# Patient Record
Sex: MALE | Race: WHITE | NOT HISPANIC OR LATINO | Employment: FULL TIME | URBAN - NONMETROPOLITAN AREA
[De-identification: names, ages, dates, MRNs, and addresses within clinical notes are randomized per-mention and may not be internally consistent; named-entity substitution may affect disease eponyms.]

---

## 2017-01-23 ENCOUNTER — HOSPITAL ENCOUNTER (OUTPATIENT)
Dept: GENERAL RADIOLOGY | Facility: HOSPITAL | Age: 22
Discharge: HOME | End: 2017-01-23
Admitting: NURSE PRACTITIONER

## 2017-01-23 ENCOUNTER — TRANSCRIBE ORDERS (OUTPATIENT)
Dept: ADMINISTRATIVE | Facility: HOSPITAL | Age: 22
End: 2017-01-23

## 2017-01-23 DIAGNOSIS — M79.672 LEFT FOOT PAIN: Primary | ICD-10-CM

## 2017-01-23 PROCEDURE — 73620 X-RAY EXAM OF FOOT: CPT

## 2018-10-07 ENCOUNTER — LAB (OUTPATIENT)
Dept: LAB | Facility: HOSPITAL | Age: 23
End: 2018-10-07

## 2018-10-07 ENCOUNTER — TRANSCRIBE ORDERS (OUTPATIENT)
Dept: ADMINISTRATIVE | Facility: HOSPITAL | Age: 23
End: 2018-10-07

## 2018-10-07 DIAGNOSIS — Z01.30 BLOOD PRESSURE CHECK: ICD-10-CM

## 2018-10-07 DIAGNOSIS — E66.9 OBESITY, UNSPECIFIED CLASSIFICATION, UNSPECIFIED OBESITY TYPE, UNSPECIFIED WHETHER SERIOUS COMORBIDITY PRESENT: ICD-10-CM

## 2018-10-07 DIAGNOSIS — R53.83 TIREDNESS: ICD-10-CM

## 2018-10-07 DIAGNOSIS — E66.9 OBESITY, UNSPECIFIED CLASSIFICATION, UNSPECIFIED OBESITY TYPE, UNSPECIFIED WHETHER SERIOUS COMORBIDITY PRESENT: Primary | ICD-10-CM

## 2018-10-07 LAB
ALBUMIN SERPL-MCNC: 4.3 G/DL (ref 3.5–5)
ALBUMIN/GLOB SERPL: 1.4 G/DL (ref 1–2)
ALP SERPL-CCNC: 98 U/L (ref 38–126)
ALT SERPL W P-5'-P-CCNC: 53 U/L (ref 13–69)
ANION GAP SERPL CALCULATED.3IONS-SCNC: 9.2 MMOL/L (ref 10–20)
AST SERPL-CCNC: 42 U/L (ref 15–46)
BASOPHILS # BLD AUTO: 0.02 10*3/MM3 (ref 0–0.2)
BASOPHILS NFR BLD AUTO: 0.3 % (ref 0–2.5)
BILIRUB SERPL-MCNC: 0.5 MG/DL (ref 0.2–1.3)
BILIRUB UR QL STRIP: NEGATIVE
BUN BLD-MCNC: 12 MG/DL (ref 7–20)
BUN/CREAT SERPL: 13.3 (ref 6.3–21.9)
CALCIUM SPEC-SCNC: 9.4 MG/DL (ref 8.4–10.2)
CHLORIDE SERPL-SCNC: 105 MMOL/L (ref 98–107)
CHOLEST SERPL-MCNC: 123 MG/DL (ref 0–199)
CLARITY UR: ABNORMAL
CO2 SERPL-SCNC: 27 MMOL/L (ref 26–30)
COLOR UR: YELLOW
CREAT BLD-MCNC: 0.9 MG/DL (ref 0.6–1.3)
DEPRECATED RDW RBC AUTO: 43.8 FL (ref 37–54)
EOSINOPHIL # BLD AUTO: 0.15 10*3/MM3 (ref 0–0.7)
EOSINOPHIL NFR BLD AUTO: 2.2 % (ref 0–7)
ERYTHROCYTE [DISTWIDTH] IN BLOOD BY AUTOMATED COUNT: 15.7 % (ref 11.5–14.5)
GFR SERPL CREATININE-BSD FRML MDRD: 106 ML/MIN/1.73
GLOBULIN UR ELPH-MCNC: 3 GM/DL
GLUCOSE BLD-MCNC: 111 MG/DL (ref 74–98)
GLUCOSE UR STRIP-MCNC: NEGATIVE MG/DL
HCT VFR BLD AUTO: 38.5 % (ref 42–52)
HDLC SERPL-MCNC: 34 MG/DL (ref 40–60)
HGB BLD-MCNC: 12.3 G/DL (ref 14–18)
HGB UR QL STRIP.AUTO: NEGATIVE
IMM GRANULOCYTES # BLD: 0.03 10*3/MM3 (ref 0–0.06)
IMM GRANULOCYTES NFR BLD: 0.4 % (ref 0–0.6)
KETONES UR QL STRIP: NEGATIVE
LDLC SERPL CALC-MCNC: 39 MG/DL (ref 0–99)
LDLC/HDLC SERPL: 1.16 {RATIO}
LEUKOCYTE ESTERASE UR QL STRIP.AUTO: NEGATIVE
LYMPHOCYTES # BLD AUTO: 1.77 10*3/MM3 (ref 0.6–3.4)
LYMPHOCYTES NFR BLD AUTO: 26.3 % (ref 10–50)
MCH RBC QN AUTO: 24.9 PG (ref 27–31)
MCHC RBC AUTO-ENTMCNC: 31.9 G/DL (ref 30–37)
MCV RBC AUTO: 77.9 FL (ref 80–94)
MONOCYTES # BLD AUTO: 0.51 10*3/MM3 (ref 0–0.9)
MONOCYTES NFR BLD AUTO: 7.6 % (ref 0–12)
NEUTROPHILS # BLD AUTO: 4.24 10*3/MM3 (ref 2–6.9)
NEUTROPHILS NFR BLD AUTO: 63.2 % (ref 37–80)
NITRITE UR QL STRIP: NEGATIVE
NRBC BLD MANUAL-RTO: 0 /100 WBC (ref 0–0)
PH UR STRIP.AUTO: 6 [PH] (ref 5–8)
PLATELET # BLD AUTO: 235 10*3/MM3 (ref 130–400)
PMV BLD AUTO: 10.2 FL (ref 6–12)
POTASSIUM BLD-SCNC: 4.2 MMOL/L (ref 3.5–5.1)
PROT SERPL-MCNC: 7.3 G/DL (ref 6.3–8.2)
PROT UR QL STRIP: NEGATIVE
RBC # BLD AUTO: 4.94 10*6/MM3 (ref 4.7–6.1)
SODIUM BLD-SCNC: 137 MMOL/L (ref 137–145)
SP GR UR STRIP: 1.02 (ref 1–1.03)
T4 FREE SERPL-MCNC: 0.92 NG/DL (ref 0.78–2.19)
TRIGL SERPL-MCNC: 248 MG/DL
TSH SERPL DL<=0.05 MIU/L-ACNC: 0.81 MIU/ML (ref 0.47–4.68)
UROBILINOGEN UR QL STRIP: ABNORMAL
VLDLC SERPL-MCNC: 49.6 MG/DL
WBC NRBC COR # BLD: 6.72 10*3/MM3 (ref 4.8–10.8)

## 2018-10-07 PROCEDURE — 80061 LIPID PANEL: CPT

## 2018-10-07 PROCEDURE — 82607 VITAMIN B-12: CPT

## 2018-10-07 PROCEDURE — 36415 COLL VENOUS BLD VENIPUNCTURE: CPT

## 2018-10-07 PROCEDURE — 81003 URINALYSIS AUTO W/O SCOPE: CPT

## 2018-10-07 PROCEDURE — 84481 FREE ASSAY (FT-3): CPT

## 2018-10-07 PROCEDURE — 80053 COMPREHEN METABOLIC PANEL: CPT

## 2018-10-07 PROCEDURE — 83036 HEMOGLOBIN GLYCOSYLATED A1C: CPT

## 2018-10-07 PROCEDURE — 84439 ASSAY OF FREE THYROXINE: CPT

## 2018-10-07 PROCEDURE — 85025 COMPLETE CBC W/AUTO DIFF WBC: CPT

## 2018-10-07 PROCEDURE — 84443 ASSAY THYROID STIM HORMONE: CPT

## 2018-10-08 LAB
HBA1C MFR BLD: 5.4 % (ref 3–6)
T3FREE SERPL-MCNC: 3.9 PG/ML (ref 2–4.4)
VIT B12 BLD-MCNC: 524 PG/ML (ref 239–931)

## 2019-04-22 ENCOUNTER — TRANSCRIBE ORDERS (OUTPATIENT)
Dept: LAB | Facility: HOSPITAL | Age: 24
End: 2019-04-22

## 2019-04-22 ENCOUNTER — LAB (OUTPATIENT)
Dept: LAB | Facility: HOSPITAL | Age: 24
End: 2019-04-22

## 2019-04-22 DIAGNOSIS — R07.9 CHEST PAIN, UNSPECIFIED TYPE: ICD-10-CM

## 2019-04-22 DIAGNOSIS — I10 ESSENTIAL (PRIMARY) HYPERTENSION: ICD-10-CM

## 2019-04-22 DIAGNOSIS — E66.9 OBESITY, UNSPECIFIED CLASSIFICATION, UNSPECIFIED OBESITY TYPE, UNSPECIFIED WHETHER SERIOUS COMORBIDITY PRESENT: ICD-10-CM

## 2019-04-22 DIAGNOSIS — E66.9 OBESITY, UNSPECIFIED CLASSIFICATION, UNSPECIFIED OBESITY TYPE, UNSPECIFIED WHETHER SERIOUS COMORBIDITY PRESENT: Primary | ICD-10-CM

## 2019-04-22 LAB
ALBUMIN SERPL-MCNC: 4.4 G/DL (ref 3.5–5)
ALBUMIN/GLOB SERPL: 1.3 G/DL (ref 1–2)
ALP SERPL-CCNC: 107 U/L (ref 38–126)
ALT SERPL W P-5'-P-CCNC: 39 U/L (ref 13–69)
ANION GAP SERPL CALCULATED.3IONS-SCNC: 16.3 MMOL/L (ref 10–20)
AST SERPL-CCNC: 31 U/L (ref 15–46)
BACTERIA UR QL AUTO: ABNORMAL /HPF
BASOPHILS # BLD AUTO: 0.06 10*3/MM3 (ref 0–0.2)
BASOPHILS NFR BLD AUTO: 0.7 % (ref 0–1.5)
BILIRUB SERPL-MCNC: 0.5 MG/DL (ref 0.2–1.3)
BILIRUB UR QL STRIP: NEGATIVE
BUN BLD-MCNC: 14 MG/DL (ref 7–20)
BUN/CREAT SERPL: 17.5 (ref 6.3–21.9)
CALCIUM SPEC-SCNC: 9.8 MG/DL (ref 8.4–10.2)
CHLORIDE SERPL-SCNC: 98 MMOL/L (ref 98–107)
CHOLEST SERPL-MCNC: 147 MG/DL (ref 0–199)
CLARITY UR: CLEAR
CO2 SERPL-SCNC: 27 MMOL/L (ref 26–30)
COLOR UR: YELLOW
CREAT BLD-MCNC: 0.8 MG/DL (ref 0.6–1.3)
DEPRECATED RDW RBC AUTO: 43.4 FL (ref 37–54)
EOSINOPHIL # BLD AUTO: 0.23 10*3/MM3 (ref 0–0.4)
EOSINOPHIL NFR BLD AUTO: 2.7 % (ref 0.3–6.2)
ERYTHROCYTE [DISTWIDTH] IN BLOOD BY AUTOMATED COUNT: 15.9 % (ref 12.3–15.4)
GFR SERPL CREATININE-BSD FRML MDRD: 120 ML/MIN/1.73
GLOBULIN UR ELPH-MCNC: 3.3 GM/DL
GLUCOSE BLD-MCNC: 111 MG/DL (ref 74–98)
GLUCOSE UR STRIP-MCNC: NEGATIVE MG/DL
HBA1C MFR BLD: 5.5 % (ref 3–6)
HCT VFR BLD AUTO: 42 % (ref 37.5–51)
HDLC SERPL-MCNC: 32 MG/DL (ref 40–60)
HGB BLD-MCNC: 13.8 G/DL (ref 13–17.7)
HGB UR QL STRIP.AUTO: NEGATIVE
HYALINE CASTS UR QL AUTO: ABNORMAL /LPF
IMM GRANULOCYTES # BLD AUTO: 0.03 10*3/MM3 (ref 0–0.05)
IMM GRANULOCYTES NFR BLD AUTO: 0.3 % (ref 0–0.5)
KETONES UR QL STRIP: NEGATIVE
LDLC SERPL CALC-MCNC: 65 MG/DL (ref 0–99)
LDLC/HDLC SERPL: 2.03 {RATIO}
LEUKOCYTE ESTERASE UR QL STRIP.AUTO: NEGATIVE
LYMPHOCYTES # BLD AUTO: 1.97 10*3/MM3 (ref 0.7–3.1)
LYMPHOCYTES NFR BLD AUTO: 22.7 % (ref 19.6–45.3)
MCH RBC QN AUTO: 25.3 PG (ref 26.6–33)
MCHC RBC AUTO-ENTMCNC: 32.9 G/DL (ref 31.5–35.7)
MCV RBC AUTO: 77.1 FL (ref 79–97)
MONOCYTES # BLD AUTO: 0.41 10*3/MM3 (ref 0.1–0.9)
MONOCYTES NFR BLD AUTO: 4.7 % (ref 5–12)
NEUTROPHILS # BLD AUTO: 5.96 10*3/MM3 (ref 1.7–7)
NEUTROPHILS NFR BLD AUTO: 68.9 % (ref 42.7–76)
NITRITE UR QL STRIP: NEGATIVE
NRBC BLD AUTO-RTO: 0 /100 WBC (ref 0–0.2)
PH UR STRIP.AUTO: 6 [PH] (ref 5–8)
PLATELET # BLD AUTO: 274 10*3/MM3 (ref 140–450)
PMV BLD AUTO: 10 FL (ref 6–12)
POTASSIUM BLD-SCNC: 4.3 MMOL/L (ref 3.5–5.1)
PROT SERPL-MCNC: 7.7 G/DL (ref 6.3–8.2)
PROT UR QL STRIP: NEGATIVE
RBC # BLD AUTO: 5.45 10*6/MM3 (ref 4.14–5.8)
RBC # UR: ABNORMAL /HPF
REF LAB TEST METHOD: ABNORMAL
SODIUM BLD-SCNC: 137 MMOL/L (ref 137–145)
SP GR UR STRIP: 1.02 (ref 1–1.03)
SQUAMOUS #/AREA URNS HPF: ABNORMAL /HPF
TRIGL SERPL-MCNC: 251 MG/DL
TSH SERPL DL<=0.05 MIU/L-ACNC: 1.28 MIU/ML (ref 0.47–4.68)
UROBILINOGEN UR QL STRIP: NORMAL
VIT B12 BLD-MCNC: 637 PG/ML (ref 239–931)
VLDLC SERPL-MCNC: 50.2 MG/DL
WBC NRBC COR # BLD: 8.66 10*3/MM3 (ref 3.4–10.8)
WBC UR QL AUTO: ABNORMAL /HPF

## 2019-04-22 PROCEDURE — 80053 COMPREHEN METABOLIC PANEL: CPT

## 2019-04-22 PROCEDURE — 84481 FREE ASSAY (FT-3): CPT

## 2019-04-22 PROCEDURE — 84443 ASSAY THYROID STIM HORMONE: CPT

## 2019-04-22 PROCEDURE — 81001 URINALYSIS AUTO W/SCOPE: CPT

## 2019-04-22 PROCEDURE — 80061 LIPID PANEL: CPT

## 2019-04-22 PROCEDURE — 83036 HEMOGLOBIN GLYCOSYLATED A1C: CPT

## 2019-04-22 PROCEDURE — 82607 VITAMIN B-12: CPT

## 2019-04-22 PROCEDURE — 84436 ASSAY OF TOTAL THYROXINE: CPT

## 2019-04-22 PROCEDURE — 85025 COMPLETE CBC W/AUTO DIFF WBC: CPT

## 2019-04-22 PROCEDURE — 36415 COLL VENOUS BLD VENIPUNCTURE: CPT

## 2019-04-23 LAB
T3FREE SERPL-MCNC: 3.7 PG/ML (ref 2–4.4)
T4 SERPL-MCNC: 6.6 UG/DL (ref 4.5–12)

## 2019-06-27 ENCOUNTER — OFFICE VISIT (OUTPATIENT)
Dept: PULMONOLOGY | Facility: CLINIC | Age: 24
End: 2019-06-27

## 2019-06-27 VITALS
DIASTOLIC BLOOD PRESSURE: 82 MMHG | BODY MASS INDEX: 39.17 KG/M2 | HEIGHT: 75 IN | SYSTOLIC BLOOD PRESSURE: 138 MMHG | HEART RATE: 90 BPM | RESPIRATION RATE: 18 BRPM | WEIGHT: 315 LBS | OXYGEN SATURATION: 98 %

## 2019-06-27 DIAGNOSIS — G47.33 OBSTRUCTIVE SLEEP APNEA: ICD-10-CM

## 2019-06-27 DIAGNOSIS — G47.8 SLEEP TALKING: ICD-10-CM

## 2019-06-27 DIAGNOSIS — E66.01 MORBID OBESITY, UNSPECIFIED OBESITY TYPE (HCC): ICD-10-CM

## 2019-06-27 DIAGNOSIS — R06.83 SNORING: Primary | ICD-10-CM

## 2019-06-27 DIAGNOSIS — G47.52 DREAM ENACTMENT BEHAVIOR: ICD-10-CM

## 2019-06-27 DIAGNOSIS — G47.19 EXCESSIVE DAYTIME SLEEPINESS: ICD-10-CM

## 2019-06-27 PROCEDURE — 99244 OFF/OP CNSLTJ NEW/EST MOD 40: CPT | Performed by: INTERNAL MEDICINE

## 2019-06-27 RX ORDER — CARVEDILOL 25 MG/1
TABLET ORAL
COMMUNITY
Start: 2019-06-17 | End: 2020-09-25 | Stop reason: SDUPTHER

## 2019-06-27 RX ORDER — LISINOPRIL 10 MG/1
10 TABLET ORAL DAILY
Refills: 0 | COMMUNITY
Start: 2019-05-16 | End: 2020-07-16 | Stop reason: ALTCHOICE

## 2019-06-27 NOTE — PROGRESS NOTES
CONSULT NOTE    Requested by:   Chantal Gutierrez APRN      Chief Complaint   Patient presents with   • Consult   • Sleeping Problem       Subjective:  Jesús Mijares is a 23 y.o. male.     History of Present Illness   Patient came in today for evaluation of possible sleep apnea. Patient says that for the past few years he snores loudly and has woken up in the middle of the night gasping for breath and sometimes with a choking sensation. Also, the patient's family notes that he has occasional pauses in the breathing.     he feels that he doesn't get restful night sleep and his quality has diminished considerably. he does feel sleepy watching TV and reading a book.      he is not complaining of occasional headaches. Patient mentions having nights when he sleep talks. he also act out his dreams.     There is known family history of sleep apnea, in his father and grand mother.      his Epsworth Sleepiness score was 12 /24.     Patient suffers from hypertension.     he drinks 6-8 cups/cans of caffeinated drinks per day.    He works 2nd shift from 3 PM till 11 PM.       The following portions of the patient's history were reviewed and updated as appropriate: allergies, current medications, past family history, past medical history, past social history and past surgical history.    Review of Systems   Constitutional: Negative for chills, fatigue and fever.   HENT: Negative for sinus pressure, sneezing and sore throat.    Respiratory: Positive for chest tightness and shortness of breath. Negative for cough and wheezing.    Cardiovascular: Positive for palpitations and leg swelling.   Psychiatric/Behavioral: Positive for sleep disturbance.   All other systems reviewed and are negative.      Past Medical History:   Diagnosis Date   • Asthma, intrinsic    • Bronchitis    • Hypertension        Social History     Tobacco Use   • Smoking status: Former Smoker     Packs/day: 0.50     Last attempt to quit: 2015     Years  "since quittin.4   • Smokeless tobacco: Never Used   • Tobacco comment: patient smoke 3 months   Substance Use Topics   • Alcohol use: No     Frequency: Never         Objective:  Visit Vitals  /82   Pulse 90   Resp 18   Ht 190.5 cm (75\")   Wt (!) 210 kg (464 lb)   SpO2 98%   BMI 58.00 kg/m²       Physical Exam   Constitutional: He is oriented to person, place, and time. He appears well-developed and well-nourished.   HENT:   Head: Atraumatic.   Crowded Oropharynx.    Eyes: EOM are normal. Pupils are equal, round, and reactive to light.   Neck: No JVD present. No tracheal deviation present. No thyromegaly present.   Increased adipose tissue.    Cardiovascular: Normal rate and regular rhythm.   Pulmonary/Chest: Effort normal and breath sounds normal. No respiratory distress. He has no wheezes.   Musculoskeletal: Normal range of motion. He exhibits no edema.   Gait was normal.   Neurological: He is alert and oriented to person, place, and time.   Skin: Skin is warm and dry.   Psychiatric: He has a normal mood and affect. His behavior is normal.   Vitals reviewed.      Assessment/Plan:  Jesús was seen today for consult and sleeping problem.    Diagnoses and all orders for this visit:    Snoring    Obstructive sleep apnea    Excessive daytime sleepiness    Morbid obesity, unspecified obesity type (CMS/HCC)    Sleep talking    Dream enactment behavior        Return in about 10 weeks (around 2019) for Sleep/Lauren.    DISCUSSION(if any):  Laboratory workup was also reviewed which showed   Lab Results   Component Value Date    CO2 27.0 2019     ===========================  ===========================    Sleep questionnaire was reviewed with the patient    The pathophysiology of sleep apnea was discussed, with him.     We will encourage him to schedule the sleep study soon.     The patient will definitely need to be considered for an in lab study due to dream enactment, sleep talking among other " reasons.  It should be noted that a home sleep study is likely to underestimate the true AHI and is unable to assess sleep stages and abnormal sleep behaviors etc. The patient has understood.    The patient is agreeable to try CPAP/BiPAP, if needed.     Patient was educated on good sleep hygiene measures and voiced understanding of the same.     Weight loss advised. Will recommend him to consider weight loss surgery/programs, after LORRAINE has been diagnosed and hopefully he is compliant with CPAP.     Patient was given reading material regarding sleep apnea.        Dictated utilizing Dragon dictation.    This document was electronically signed by Steven Bond MD on 06/27/19 at 11:40 AM

## 2019-08-28 ENCOUNTER — HOSPITAL ENCOUNTER (EMERGENCY)
Facility: HOSPITAL | Age: 24
Discharge: HOME | End: 2019-08-28
Attending: EMERGENCY MEDICINE | Admitting: EMERGENCY MEDICINE

## 2019-08-28 VITALS
OXYGEN SATURATION: 96 % | WEIGHT: 315 LBS | HEART RATE: 93 BPM | RESPIRATION RATE: 20 BRPM | DIASTOLIC BLOOD PRESSURE: 80 MMHG | TEMPERATURE: 97.8 F | SYSTOLIC BLOOD PRESSURE: 139 MMHG | BODY MASS INDEX: 40.43 KG/M2 | HEIGHT: 74 IN
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
## 2019-08-28 DIAGNOSIS — T22.212A PARTIAL THICKNESS BURN OF LEFT FOREARM, INITIAL ENCOUNTER: Primary | ICD-10-CM

## 2019-08-28 PROCEDURE — 90471 IMMUNIZATION ADMIN: CPT | Performed by: EMERGENCY MEDICINE

## 2019-08-28 PROCEDURE — 99283 EMERGENCY DEPT VISIT LOW MDM: CPT

## 2019-08-28 PROCEDURE — 25010000002 TDAP 5-2.5-18.5 LF-MCG/0.5 SUSPENSION: Performed by: EMERGENCY MEDICINE

## 2019-08-28 PROCEDURE — 90715 TDAP VACCINE 7 YRS/> IM: CPT | Performed by: EMERGENCY MEDICINE

## 2019-08-28 RX ORDER — BACITRACIN ZINC 500 [USP'U]/G
OINTMENT TOPICAL ONCE
Status: COMPLETED | OUTPATIENT
Start: 2019-08-28 | End: 2019-08-28

## 2019-08-28 RX ADMIN — TETANUS TOXOID, REDUCED DIPHTHERIA TOXOID AND ACELLULAR PERTUSSIS VACCINE, ADSORBED 0.5 ML: 5; 2.5; 8; 8; 2.5 SUSPENSION INTRAMUSCULAR at 00:49

## 2019-08-28 RX ADMIN — BACITRACIN ZINC: 500 OINTMENT TOPICAL at 00:50

## 2019-09-13 DIAGNOSIS — G47.33 OBSTRUCTIVE SLEEP APNEA: Primary | ICD-10-CM

## 2019-09-25 ENCOUNTER — HOSPITAL ENCOUNTER (OUTPATIENT)
Dept: SLEEP MEDICINE | Facility: HOSPITAL | Age: 24
Setting detail: THERAPIES SERIES
End: 2019-09-25

## 2019-09-25 DIAGNOSIS — G47.33 OBSTRUCTIVE SLEEP APNEA: ICD-10-CM

## 2019-09-25 PROCEDURE — 95810 POLYSOM 6/> YRS 4/> PARAM: CPT

## 2019-09-25 PROCEDURE — 95810 POLYSOM 6/> YRS 4/> PARAM: CPT | Performed by: INTERNAL MEDICINE

## 2019-10-22 ENCOUNTER — TRANSCRIBE ORDERS (OUTPATIENT)
Dept: GENERAL RADIOLOGY | Facility: HOSPITAL | Age: 24
End: 2019-10-22

## 2019-10-22 ENCOUNTER — HOSPITAL ENCOUNTER (OUTPATIENT)
Dept: GENERAL RADIOLOGY | Facility: HOSPITAL | Age: 24
Discharge: HOME | End: 2019-10-22
Admitting: NURSE PRACTITIONER

## 2019-10-22 DIAGNOSIS — M54.50 LOW BACK PAIN, UNSPECIFIED BACK PAIN LATERALITY, UNSPECIFIED CHRONICITY, UNSPECIFIED WHETHER SCIATICA PRESENT: Primary | ICD-10-CM

## 2019-10-22 DIAGNOSIS — M54.50 LOW BACK PAIN, UNSPECIFIED BACK PAIN LATERALITY, UNSPECIFIED CHRONICITY, UNSPECIFIED WHETHER SCIATICA PRESENT: ICD-10-CM

## 2019-10-22 PROCEDURE — 72110 X-RAY EXAM L-2 SPINE 4/>VWS: CPT

## 2019-11-01 ENCOUNTER — TRANSCRIBE ORDERS (OUTPATIENT)
Dept: LAB | Facility: HOSPITAL | Age: 24
End: 2019-11-01

## 2019-11-01 ENCOUNTER — LAB (OUTPATIENT)
Dept: LAB | Facility: HOSPITAL | Age: 24
End: 2019-11-01

## 2019-11-01 DIAGNOSIS — I10 ESSENTIAL HYPERTENSION, MALIGNANT: Primary | ICD-10-CM

## 2019-11-01 DIAGNOSIS — E66.9 OBESITY, UNSPECIFIED CLASSIFICATION, UNSPECIFIED OBESITY TYPE, UNSPECIFIED WHETHER SERIOUS COMORBIDITY PRESENT: ICD-10-CM

## 2019-11-01 LAB
ALBUMIN SERPL-MCNC: 3.9 G/DL (ref 3.5–5.2)
ALBUMIN/GLOB SERPL: 1.2 G/DL
ALP SERPL-CCNC: 94 U/L (ref 39–117)
ALT SERPL W P-5'-P-CCNC: 24 U/L (ref 1–41)
ANION GAP SERPL CALCULATED.3IONS-SCNC: 6.9 MMOL/L (ref 5–15)
AST SERPL-CCNC: 18 U/L (ref 1–40)
BACTERIA UR QL AUTO: NORMAL /HPF
BILIRUB SERPL-MCNC: 0.3 MG/DL (ref 0.2–1.2)
BILIRUB UR QL STRIP: NEGATIVE
BUN BLD-MCNC: 11 MG/DL (ref 6–20)
BUN/CREAT SERPL: 12.1 (ref 7–25)
CALCIUM SPEC-SCNC: 9.3 MG/DL (ref 8.6–10.5)
CHLORIDE SERPL-SCNC: 98 MMOL/L (ref 98–107)
CHOLEST SERPL-MCNC: 125 MG/DL (ref 0–200)
CLARITY UR: CLEAR
CO2 SERPL-SCNC: 29.1 MMOL/L (ref 22–29)
COD CRY URNS QL: NORMAL /HPF
COLOR UR: YELLOW
CREAT BLD-MCNC: 0.91 MG/DL (ref 0.76–1.27)
DEPRECATED RDW RBC AUTO: 42.5 FL (ref 37–54)
ERYTHROCYTE [DISTWIDTH] IN BLOOD BY AUTOMATED COUNT: 15.7 % (ref 12.3–15.4)
GFR SERPL CREATININE-BSD FRML MDRD: 103 ML/MIN/1.73
GLOBULIN UR ELPH-MCNC: 3.3 GM/DL
GLUCOSE BLD-MCNC: 110 MG/DL (ref 65–99)
GLUCOSE UR STRIP-MCNC: NEGATIVE MG/DL
HBA1C MFR BLD: 5.5 % (ref 4.8–5.6)
HCT VFR BLD AUTO: 38.4 % (ref 37.5–51)
HDLC SERPL-MCNC: 28 MG/DL (ref 40–60)
HGB BLD-MCNC: 12.5 G/DL (ref 13–17.7)
HGB UR QL STRIP.AUTO: NEGATIVE
HYALINE CASTS UR QL AUTO: NORMAL /LPF
KETONES UR QL STRIP: NEGATIVE
LDLC SERPL CALC-MCNC: 45 MG/DL (ref 0–100)
LDLC/HDLC SERPL: 1.61 {RATIO}
LEUKOCYTE ESTERASE UR QL STRIP.AUTO: NEGATIVE
MCH RBC QN AUTO: 24.5 PG (ref 26.6–33)
MCHC RBC AUTO-ENTMCNC: 32.6 G/DL (ref 31.5–35.7)
MCV RBC AUTO: 75.3 FL (ref 79–97)
NITRITE UR QL STRIP: NEGATIVE
PH UR STRIP.AUTO: 6.5 [PH] (ref 5–8)
PLATELET # BLD AUTO: 243 10*3/MM3 (ref 140–450)
PMV BLD AUTO: 10.8 FL (ref 6–12)
POTASSIUM BLD-SCNC: 4.4 MMOL/L (ref 3.5–5.2)
PROT SERPL-MCNC: 7.2 G/DL (ref 6–8.5)
PROT UR QL STRIP: NEGATIVE
RBC # BLD AUTO: 5.1 10*6/MM3 (ref 4.14–5.8)
RBC # UR: NORMAL /HPF
REF LAB TEST METHOD: NORMAL
SODIUM BLD-SCNC: 134 MMOL/L (ref 136–145)
SP GR UR STRIP: 1.02 (ref 1–1.03)
SQUAMOUS #/AREA URNS HPF: NORMAL /HPF
T3FREE SERPL-MCNC: 3.79 PG/ML (ref 2–4.4)
T4 SERPL-MCNC: 5.42 MCG/DL (ref 4.5–11.7)
TRIGL SERPL-MCNC: 259 MG/DL (ref 0–150)
TSH SERPL DL<=0.05 MIU/L-ACNC: 2.18 UIU/ML (ref 0.27–4.2)
UROBILINOGEN UR QL STRIP: NORMAL
VIT B12 BLD-MCNC: 584 PG/ML (ref 211–946)
VLDLC SERPL-MCNC: 51.8 MG/DL (ref 5–40)
WBC NRBC COR # BLD: 7.98 10*3/MM3 (ref 3.4–10.8)
WBC UR QL AUTO: NORMAL /HPF

## 2019-11-01 PROCEDURE — 84481 FREE ASSAY (FT-3): CPT | Performed by: NURSE PRACTITIONER

## 2019-11-01 PROCEDURE — 83036 HEMOGLOBIN GLYCOSYLATED A1C: CPT | Performed by: NURSE PRACTITIONER

## 2019-11-01 PROCEDURE — 81001 URINALYSIS AUTO W/SCOPE: CPT | Performed by: NURSE PRACTITIONER

## 2019-11-01 PROCEDURE — 80061 LIPID PANEL: CPT | Performed by: NURSE PRACTITIONER

## 2019-11-01 PROCEDURE — 85027 COMPLETE CBC AUTOMATED: CPT

## 2019-11-01 PROCEDURE — 82607 VITAMIN B-12: CPT | Performed by: NURSE PRACTITIONER

## 2019-11-01 PROCEDURE — 84436 ASSAY OF TOTAL THYROXINE: CPT | Performed by: NURSE PRACTITIONER

## 2019-11-01 PROCEDURE — 80053 COMPREHEN METABOLIC PANEL: CPT | Performed by: NURSE PRACTITIONER

## 2019-11-01 PROCEDURE — 84443 ASSAY THYROID STIM HORMONE: CPT | Performed by: NURSE PRACTITIONER

## 2019-11-01 PROCEDURE — 36415 COLL VENOUS BLD VENIPUNCTURE: CPT | Performed by: NURSE PRACTITIONER

## 2019-11-13 ENCOUNTER — OFFICE VISIT (OUTPATIENT)
Dept: PULMONOLOGY | Facility: CLINIC | Age: 24
End: 2019-11-13

## 2019-11-13 VITALS
WEIGHT: 315 LBS | BODY MASS INDEX: 40.43 KG/M2 | RESPIRATION RATE: 18 BRPM | SYSTOLIC BLOOD PRESSURE: 128 MMHG | OXYGEN SATURATION: 93 % | DIASTOLIC BLOOD PRESSURE: 70 MMHG | HEART RATE: 89 BPM | HEIGHT: 74 IN

## 2019-11-13 DIAGNOSIS — G47.19 EXCESSIVE DAYTIME SLEEPINESS: ICD-10-CM

## 2019-11-13 DIAGNOSIS — G47.33 OBSTRUCTIVE SLEEP APNEA: Primary | ICD-10-CM

## 2019-11-13 PROCEDURE — 99213 OFFICE O/P EST LOW 20 MIN: CPT | Performed by: NURSE PRACTITIONER

## 2019-11-13 RX ORDER — ZOLPIDEM TARTRATE 5 MG/1
TABLET ORAL
Qty: 2 TABLET | Refills: 0 | Status: SHIPPED | OUTPATIENT
Start: 2019-11-13 | End: 2020-01-29

## 2019-11-13 NOTE — PROGRESS NOTES
"Chief Complaint   Patient presents with   • Follow-up   • Sleeping Problem         Subjective   Jesús Mijares is a 23 y.o. male.     History of Present Illness   The patient comes in today for follow-up of obstructive sleep apnea and excessive daytime sleepiness.    I reviewed his sleep study from September and discussed the results with him today.  The sleep study did not reveal obstructive sleep apnea however he had very poor sleep efficiency with a complete lack of REM sleep.    He continues to complain of feeling tired all the time.  He feels tired after awakening in the morning even if he slept all night.  He states he does not wake often at night but occasionally he does to go to the bathroom.  He does have some difficulty reinitiating sleep after getting up.    He naps on occasion through the day may be twice a week.    He does snore per his family.    The following portions of the patient's history were reviewed and updated as appropriate: allergies, current medications, past family history, past medical history, past social history and past surgical history.    Review of Systems   Constitutional: Negative for chills and fever.   HENT: Negative for rhinorrhea, sinus pressure and sore throat.    Respiratory: Negative for cough, chest tightness, shortness of breath and wheezing.    Psychiatric/Behavioral: Negative for sleep disturbance.       Objective   Visit Vitals  /70   Pulse 89   Resp 18   Ht 188 cm (74\")   Wt (!) 211 kg (465 lb)   SpO2 93%   BMI 59.70 kg/m²     Physical Exam   Constitutional: He is oriented to person, place, and time. He appears well-developed and well-nourished.   HENT:   Head: Normocephalic and atraumatic.   Crowded oropharynx.    Musculoskeletal:   Gait was normal.   Neurological: He is alert and oriented to person, place, and time.   Psychiatric: He has a normal mood and affect.   Vitals reviewed.          Assessment/Plan   Jesús was seen today for follow-up and " sleeping problem.    Diagnoses and all orders for this visit:    Obstructive sleep apnea  -     Polysomnography 4 or More Parameters; Future    Excessive daytime sleepiness    Other orders  -     zolpidem (AMBIEN) 5 MG tablet; Take 1 tablet the night of the sleep study and may repeat with 1 tablet in 30 minutes if not able to fall asleep           Return in about 10 weeks (around 1/22/2020) for Recheck, For Me, Sleep ONLY.    DISCUSSION (if any):  He definitely seems to have symptoms suggestive of obstructive sleep apnea.  Due to his very poor sleep efficiency and complete lack of REM sleep I have suggested he repeat a sleep study with the help of a sleeping aid.  He is willing to do this.    I have written a prescription for Ambien that should be taken with him the night of the sleep study.  He will not take the medication until he has checked him for the sleep study.  I have discussed the directions with his father as well he will be the one who drives him to the sleep study and pick some up from the sleep study.      Dictated utilizing Dragon dictation.    This document was electronically signed by IRIS Denise November 13, 2019  3:18 PM

## 2019-12-19 ENCOUNTER — HOSPITAL ENCOUNTER (OUTPATIENT)
Dept: SLEEP MEDICINE | Facility: HOSPITAL | Age: 24
Discharge: HOME | End: 2019-12-19
Admitting: NURSE PRACTITIONER

## 2019-12-19 DIAGNOSIS — G47.33 OBSTRUCTIVE SLEEP APNEA: ICD-10-CM

## 2019-12-19 PROCEDURE — 95810 POLYSOM 6/> YRS 4/> PARAM: CPT | Performed by: INTERNAL MEDICINE

## 2019-12-19 PROCEDURE — 95810 POLYSOM 6/> YRS 4/> PARAM: CPT

## 2020-01-29 ENCOUNTER — OFFICE VISIT (OUTPATIENT)
Dept: PULMONOLOGY | Facility: CLINIC | Age: 25
End: 2020-01-29

## 2020-01-29 VITALS
HEIGHT: 74 IN | SYSTOLIC BLOOD PRESSURE: 108 MMHG | RESPIRATION RATE: 18 BRPM | WEIGHT: 315 LBS | BODY MASS INDEX: 40.43 KG/M2 | DIASTOLIC BLOOD PRESSURE: 60 MMHG | OXYGEN SATURATION: 98 % | HEART RATE: 88 BPM

## 2020-01-29 DIAGNOSIS — G47.00 INSOMNIA, UNSPECIFIED TYPE: ICD-10-CM

## 2020-01-29 DIAGNOSIS — G47.19 EXCESSIVE DAYTIME SLEEPINESS: Primary | ICD-10-CM

## 2020-01-29 PROCEDURE — 99213 OFFICE O/P EST LOW 20 MIN: CPT | Performed by: NURSE PRACTITIONER

## 2020-01-29 RX ORDER — ZOLPIDEM TARTRATE 5 MG/1
5 TABLET ORAL NIGHTLY PRN
Qty: 30 TABLET | Refills: 3 | Status: SHIPPED | OUTPATIENT
Start: 2020-01-29 | End: 2020-04-09 | Stop reason: SDUPTHER

## 2020-01-29 RX ORDER — METHOCARBAMOL 500 MG/1
TABLET, FILM COATED ORAL
COMMUNITY
Start: 2019-11-11 | End: 2020-08-28

## 2020-04-09 ENCOUNTER — OFFICE VISIT (OUTPATIENT)
Dept: PULMONOLOGY | Facility: CLINIC | Age: 25
End: 2020-04-09

## 2020-04-09 DIAGNOSIS — G47.19 EXCESSIVE DAYTIME SLEEPINESS: Primary | ICD-10-CM

## 2020-04-09 DIAGNOSIS — E66.01 MORBID OBESITY, UNSPECIFIED OBESITY TYPE (HCC): ICD-10-CM

## 2020-04-09 DIAGNOSIS — G47.00 INSOMNIA, UNSPECIFIED TYPE: ICD-10-CM

## 2020-04-09 PROCEDURE — 99213 OFFICE O/P EST LOW 20 MIN: CPT | Performed by: NURSE PRACTITIONER

## 2020-04-09 RX ORDER — ZOLPIDEM TARTRATE 5 MG/1
5 TABLET ORAL NIGHTLY PRN
Qty: 30 TABLET | Refills: 3 | Status: SHIPPED | OUTPATIENT
Start: 2020-04-09 | End: 2020-07-16 | Stop reason: SDUPTHER

## 2020-04-09 NOTE — PROGRESS NOTES
You have chosen to receive care through a telephone visit today. Do you consent to use a telephone visit for your medical care today? Yes    Chief Complaint   Patient presents with   • Follow-up   • Sleeping Problem       Subjective   Jesús Mijares is a 24 y.o. male.     History of Present Illness   The patient is doing a telephone visit for insomnia and excessive daytime sleepiness.    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 He has not been on a sleep schedule. He goes to his bedroom about midnight and will watch TV til maybe 3-4 am. He gets up the next morning whenever he feels like it.     He has been taking the Ambien and feels that it has helped him sleep better. He does not feel as sleepy during the daytime. He takes the Ambien at midnight when he is going to his room for the night.    The following portions of the patient's history were reviewed and updated as appropriate: allergies, current medications, past family history, past medical history, past social history and past surgical history.    Review of Systems   HENT: Negative for sinus pressure, sneezing and sore throat.    Respiratory: Negative for cough, shortness of breath and wheezing.        Objective     Physical Exam    This was a remote visit. Physical exam not performed.       Assessment/Plan   Jesús was seen today for follow-up and sleeping problem.    Diagnoses and all orders for this visit:    Excessive daytime sleepiness    Morbid obesity, unspecified obesity type (CMS/HCC)    Insomnia, unspecified type  -     zolpidem (AMBIEN) 5 MG tablet; Take 1 tablet by mouth At Night As Needed for Sleep.           Return in about 3 months (around 7/9/2020) for Recheck, For Dr. Bond, Sleep ONLY.    DISCUSSION (if any):  I have asked him to take the Ambien no later than 10 pm and to go to bed no later than 11 pm. I have discouraged him from watching TV in his bedroom. I have told him to get out of the  bed at 9 am.      Since he has noticed some benefit from using Ambien, I will have him continue taking this medication on a nightly basis as discussed above.  I have also discussed with the patient that this medication is habit forming and therefore we will discuss discontinuing it in the future.  He verbalizes understanding.    Edgar reviewed #[id number].    This visit has been rescheduled as a phone visit to comply with patient safety concerns in accordance with CDC recommendations. Total time of discussion was 15 minutes.    Dictated utilizing Dragon dictation.    This document was electronically signed by IRIS Cm on 04/09/20 at 10:39

## 2020-07-16 ENCOUNTER — OFFICE VISIT (OUTPATIENT)
Dept: PULMONOLOGY | Facility: CLINIC | Age: 25
End: 2020-07-16

## 2020-07-16 VITALS
OXYGEN SATURATION: 97 % | HEART RATE: 105 BPM | TEMPERATURE: 97.1 F | WEIGHT: 315 LBS | SYSTOLIC BLOOD PRESSURE: 138 MMHG | HEIGHT: 74 IN | BODY MASS INDEX: 40.43 KG/M2 | RESPIRATION RATE: 18 BRPM | DIASTOLIC BLOOD PRESSURE: 90 MMHG

## 2020-07-16 DIAGNOSIS — G47.00 INSOMNIA, UNSPECIFIED TYPE: ICD-10-CM

## 2020-07-16 DIAGNOSIS — G47.19 EXCESSIVE DAYTIME SLEEPINESS: Primary | ICD-10-CM

## 2020-07-16 PROCEDURE — 99213 OFFICE O/P EST LOW 20 MIN: CPT | Performed by: NURSE PRACTITIONER

## 2020-07-16 RX ORDER — LISINOPRIL AND HYDROCHLOROTHIAZIDE 20; 12.5 MG/1; MG/1
1 TABLET ORAL DAILY
COMMUNITY
Start: 2020-05-12 | End: 2021-05-12 | Stop reason: SDUPTHER

## 2020-07-16 RX ORDER — ZOLPIDEM TARTRATE 5 MG/1
5 TABLET ORAL NIGHTLY PRN
Qty: 30 TABLET | Refills: 3 | Status: SHIPPED | OUTPATIENT
Start: 2020-07-16 | End: 2020-11-30

## 2020-07-16 NOTE — PROGRESS NOTES
"Chief Complaint   Patient presents with   • Apnea         Subjective   Jesús Mijares is a 24 y.o. male.     History of Present Illness   The patient comes in today for follow-up of excessive daytime sleepiness and insomnia.    He states his been doing fairly well with the Ambien and he is not as tired during the day.    He states he is not working now so he is home most of the day.    He takes Ambien around 11 PM but states it takes him a couple of hours to fall asleep after that.  He does admit to playing on his phone and watching TV prior to bed.  He states that once he goes to sleep which is usually between midnight and 1 AM that he sleeps very well.  He has been getting up at 9 AM every morning whether he wants to or not he says.    He states he does feel more rested upon awakening.  He does not nap during the day.    The following portions of the patient's history were reviewed and updated as appropriate: allergies, current medications, past family history, past medical history, past social history and past surgical history.    Review of Systems   Respiratory: Positive for apnea. Negative for cough, choking, chest tightness, shortness of breath, wheezing and stridor.        Objective   Visit Vitals  /90   Pulse 105   Temp 97.1 °F (36.2 °C) (Temporal)   Resp 18   Ht 188 cm (74\")   Wt (!) 213 kg (468 lb 12.8 oz)   SpO2 97%   BMI 60.19 kg/m²       Physical Exam   Constitutional: He is oriented to person, place, and time. He appears well-developed and well-nourished.   HENT:   Head: Normocephalic and atraumatic.   Crowded oropharynx.    Abdominal:   Obese.   Musculoskeletal:   Gait normal.   Neurological: He is alert and oriented to person, place, and time.   Psychiatric: He has a normal mood and affect.   Vitals reviewed.      Assessment/Plan   Jesús was seen today for apnea.    Diagnoses and all orders for this visit:    Excessive daytime sleepiness    Insomnia, unspecified type           Return " in about 4 months (around 11/16/2020) for Recheck, For Dr. Bond, Sleep ONLY.    DISCUSSION (if any):  Since he is getting more sleep using Ambien, he is not as tired during the day and feels rested upon awakening.    I have asked him to continue using Ambien 5 mg nightly.    We have discussed sleep hygiene and how important it is.  He is doing well getting up at the same time each morning however I have asked him to work on the time he goes to bed and to try not to use his phone an hour before bed.  I have also asked him to try to take Ambien by 10 PM instead of 11 PM so that maybe he can fall asleep by midnight.    I discussed that adjusting his sleep schedule in small increments may be even 30-minute increments over time would help him regulate to a better sleep pattern.  He verbalizes understanding.    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    Edgar reviewed #[id number].    Dictated utilizing Dragon dictation.    This document was electronically signed by IRIS Denise July 16, 2020  11:30

## 2020-08-28 ENCOUNTER — OFFICE VISIT (OUTPATIENT)
Dept: CARDIOLOGY | Facility: CLINIC | Age: 25
End: 2020-08-28

## 2020-08-28 VITALS
HEIGHT: 74 IN | HEART RATE: 94 BPM | BODY MASS INDEX: 40.43 KG/M2 | SYSTOLIC BLOOD PRESSURE: 120 MMHG | DIASTOLIC BLOOD PRESSURE: 80 MMHG | WEIGHT: 315 LBS | OXYGEN SATURATION: 97 %

## 2020-08-28 DIAGNOSIS — E66.01 MORBID OBESITY (HCC): ICD-10-CM

## 2020-08-28 DIAGNOSIS — R07.2 PRECORDIAL PAIN: Primary | ICD-10-CM

## 2020-08-28 DIAGNOSIS — I10 ESSENTIAL HYPERTENSION: ICD-10-CM

## 2020-08-28 PROCEDURE — 93000 ELECTROCARDIOGRAM COMPLETE: CPT | Performed by: INTERNAL MEDICINE

## 2020-08-28 PROCEDURE — 99204 OFFICE O/P NEW MOD 45 MIN: CPT | Performed by: INTERNAL MEDICINE

## 2020-08-28 RX ORDER — AMLODIPINE BESYLATE 5 MG/1
5 TABLET ORAL
COMMUNITY
Start: 2020-08-06 | End: 2021-05-03 | Stop reason: SDUPTHER

## 2020-08-28 NOTE — PROGRESS NOTES
Subjective:     Encounter Date:08/28/2020      Patient ID: Jesús Mijares is a 24 y.o. male.    Chief Complaint: Chest pain  HPI  This is a 24-year-old male patient who presents to cardiology clinic to establish care after his previous cardiologist changed jobs.  The patient indicates he has been having chest discomfort for several months.  He reports an aching pain localized to a discrete area between his left breast and head of his left clavicle.  The discomfort does not radiate.  The patient indicates that the discomfort is present on a daily basis often times more than once per day.  The discomfort has a 5/10 intensity and a variable duration.  There is no associated nausea vomiting diaphoresis or shortness of breath.  I cannot elicit a history of a pleuritic component or a consistent exertional component.  He reports some shortness of breath with activity that is relieved with rest.  He has no orthopnea PND or lower extremity edema.  He has no dizziness palpitations or syncope.  He is morbidly obese.  He has a history of hypertension and takes 4 medications for blood pressure control.  He has no personal history of diabetes or elevated cholesterol.  He denies having dizziness palpitations or syncope.  The following portions of the patient's history were reviewed and updated as appropriate: allergies, current medications, past family history, past medical history, past social history, past surgical history and problem  Review of Systems   Constitution: Negative for chills, diaphoresis, fever, malaise/fatigue, weight gain and weight loss.   HENT: Negative for ear discharge, hearing loss, hoarse voice and nosebleeds.    Eyes: Negative for discharge, double vision, pain and photophobia.   Cardiovascular: Positive for chest pain. Negative for claudication, cyanosis, dyspnea on exertion, irregular heartbeat, leg swelling, near-syncope, orthopnea, palpitations, paroxysmal nocturnal dyspnea and syncope.    Respiratory: Negative for cough, hemoptysis, shortness of breath, sputum production and wheezing.    Endocrine: Negative for cold intolerance, heat intolerance, polydipsia, polyphagia and polyuria.   Hematologic/Lymphatic: Negative for adenopathy and bleeding problem. Does not bruise/bleed easily.   Skin: Negative for color change, flushing, itching and rash.   Musculoskeletal: Negative for muscle cramps, muscle weakness, myalgias and stiffness.   Gastrointestinal: Negative for abdominal pain, diarrhea, hematemesis, hematochezia, nausea and vomiting.   Genitourinary: Negative for dysuria, frequency and nocturia.   Neurological: Negative for focal weakness, loss of balance, numbness, paresthesias and seizures.   Psychiatric/Behavioral: Negative for altered mental status, hallucinations and suicidal ideas.   Allergic/Immunologic: Negative for HIV exposure, hives and persistent infections.           Current Outpatient Medications:   •  amLODIPine (NORVASC) 5 MG tablet, Take 5 mg by mouth every night at bedtime., Disp: , Rfl:   •  carvedilol (COREG) 25 MG tablet, , Disp: , Rfl:   •  lisinopril-hydrochlorothiazide (PRINZIDE,ZESTORETIC) 20-12.5 MG per tablet, Take 1 tablet by mouth Daily. 200001, Disp: , Rfl:   •  zolpidem (AMBIEN) 5 MG tablet, Take 1 tablet by mouth At Night As Needed for Sleep., Disp: 30 tablet, Rfl: 3    Objective:   Physical Exam   Constitutional: He is oriented to person, place, and time. He appears well-developed and well-nourished. No distress.   HENT:   Head: Normocephalic and atraumatic.   Mouth/Throat: Oropharynx is clear and moist.   Eyes: Pupils are equal, round, and reactive to light. Conjunctivae and EOM are normal. No scleral icterus.   Neck: Normal range of motion. Neck supple. No JVD present. No tracheal deviation present. No thyromegaly present.   Cardiovascular: Normal rate, regular rhythm, S1 normal, S2 normal, normal heart sounds, intact distal pulses and normal pulses. PMI is not  "displaced. Exam reveals no gallop and no friction rub.   No murmur heard.  Pulmonary/Chest: Effort normal and breath sounds normal. No stridor. No respiratory distress. He has no wheezes. He has no rales.   Abdominal: Soft. Bowel sounds are normal. He exhibits no distension and no mass. There is no tenderness. There is no rebound and no guarding.   Musculoskeletal: Normal range of motion. He exhibits no edema or deformity.   Neurological: He is alert and oriented to person, place, and time. He displays normal reflexes. No cranial nerve deficit. Coordination normal.   Skin: Skin is warm and dry. No rash noted. He is not diaphoretic. No erythema.   Psychiatric: He has a normal mood and affect. His behavior is normal. Thought content normal.     Blood pressure 120/80, pulse 94, height 188 cm (74\"), weight (!) 208 kg (458 lb), SpO2 97 %.   Lab Review:     Assessment:       1. Precordial pain  The patient's chest discomfort has features both typical and atypical for coronary insufficiency.  The patient has never had an ischemic evaluation.  The patient has multiple risk factors for coronary artery disease.  The patient is unable to do treadmill exercise stress testing due to obesity, shortness of breath with activity and poor effort tolerance.  - Adult Stress Echo W/ Cont or Stress Agent if Necessary Per Protocol    2. Morbid obesity (CMS/HCC)  Body mass index is approaching 60.  This is due to excess calorie intake.  There is evidence of multiple comorbidities.  The obesity pattern is central.    3. Essential hypertension  Acceptable blood pressure control.      ECG 12 Lead  Date/Time: 8/28/2020 12:51 PM  Performed by: Ludwig Petty MD  Authorized by: Ludwig Petty MD   Comparison: not compared with previous ECG   Rhythm: sinus rhythm  Rate: normal  QRS axis: normal  Other findings: non-specific ST-T wave changes    Clinical impression: non-specific ECG            Plan:     I have recommended a dobutamine " stress echo with [facility].  The patient has been counseled regarding the essential need to lose weight.  No changes to his medication therapy have been made at today's visit.  Further recommendations will be predicated on the results of his outpatient testing.

## 2020-09-25 ENCOUNTER — TELEPHONE (OUTPATIENT)
Dept: CARDIOLOGY | Facility: CLINIC | Age: 25
End: 2020-09-25

## 2020-09-25 RX ORDER — CARVEDILOL 25 MG/1
25 TABLET ORAL 2 TIMES DAILY WITH MEALS
Qty: 60 TABLET | Refills: 11 | Status: SHIPPED | OUTPATIENT
Start: 2020-09-25 | End: 2021-08-25 | Stop reason: SDUPTHER

## 2020-09-28 ENCOUNTER — LAB (OUTPATIENT)
Dept: LAB | Facility: HOSPITAL | Age: 25
End: 2020-09-28

## 2020-09-28 DIAGNOSIS — Z01.818 PRE-OP TESTING: Primary | ICD-10-CM

## 2020-09-28 PROCEDURE — U0004 COV-19 TEST NON-CDC HGH THRU: HCPCS

## 2020-09-28 PROCEDURE — C9803 HOPD COVID-19 SPEC COLLECT: HCPCS

## 2020-09-29 LAB — SARS-COV-2 RNA NOSE QL NAA+PROBE: NOT DETECTED

## 2020-09-30 ENCOUNTER — HOSPITAL ENCOUNTER (OUTPATIENT)
Dept: CARDIOLOGY | Facility: HOSPITAL | Age: 25
Discharge: HOME | End: 2020-09-30
Admitting: INTERNAL MEDICINE

## 2020-09-30 VITALS — BODY MASS INDEX: 40.43 KG/M2 | WEIGHT: 315 LBS | HEIGHT: 74 IN

## 2020-09-30 PROCEDURE — 93352 ADMIN ECG CONTRAST AGENT: CPT | Performed by: INTERNAL MEDICINE

## 2020-09-30 PROCEDURE — 93321 DOPPLER ECHO F-UP/LMTD STD: CPT | Performed by: INTERNAL MEDICINE

## 2020-09-30 PROCEDURE — 93320 DOPPLER ECHO COMPLETE: CPT

## 2020-09-30 PROCEDURE — 93350 STRESS TTE ONLY: CPT

## 2020-09-30 PROCEDURE — 93017 CV STRESS TEST TRACING ONLY: CPT

## 2020-09-30 PROCEDURE — 93325 DOPPLER ECHO COLOR FLOW MAPG: CPT | Performed by: INTERNAL MEDICINE

## 2020-09-30 PROCEDURE — 93325 DOPPLER ECHO COLOR FLOW MAPG: CPT

## 2020-09-30 PROCEDURE — 93350 STRESS TTE ONLY: CPT | Performed by: INTERNAL MEDICINE

## 2020-09-30 PROCEDURE — 25010000002 DOBUTAMINE 250 MG/20ML SOLUTION 20 ML VIAL: Performed by: INTERNAL MEDICINE

## 2020-09-30 PROCEDURE — 93018 CV STRESS TEST I&R ONLY: CPT | Performed by: INTERNAL MEDICINE

## 2020-09-30 PROCEDURE — 25010000002 SULFUR HEXAFLUORIDE MICROSPH 60.7-25 MG RECONSTITUTED SUSPENSION: Performed by: INTERNAL MEDICINE

## 2020-09-30 RX ADMIN — SULFUR HEXAFLUORIDE 8 ML: KIT at 13:30

## 2020-09-30 RX ADMIN — DOBUTAMINE 10 MCG/KG/MIN: 12.5 INJECTION, SOLUTION, CONCENTRATE INTRAVENOUS at 13:30

## 2020-10-02 ENCOUNTER — TELEPHONE (OUTPATIENT)
Dept: CARDIOLOGY | Facility: CLINIC | Age: 25
End: 2020-10-02

## 2020-10-02 LAB
BH CV ECHO MEAS - AO ROOT AREA (BSA CORRECTED): 1.2
BH CV ECHO MEAS - AO ROOT AREA: 10.2 CM^2
BH CV ECHO MEAS - AO ROOT DIAM: 3.6 CM
BH CV ECHO MEAS - BSA(HAYCOCK): 3.4 M^2
BH CV ECHO MEAS - BSA: 3.1 M^2
BH CV ECHO MEAS - BZI_BMI: 58.8 KILOGRAMS/M^2
BH CV ECHO MEAS - BZI_METRIC_HEIGHT: 188 CM
BH CV ECHO MEAS - BZI_METRIC_WEIGHT: 207.7 KG
BH CV ECHO MEAS - EDV(CUBED): 115.5 ML
BH CV ECHO MEAS - EDV(MOD-SP2): 63 ML
BH CV ECHO MEAS - EDV(MOD-SP4): 171 ML
BH CV ECHO MEAS - EDV(TEICH): 111.2 ML
BH CV ECHO MEAS - EF(CUBED): 77.8 %
BH CV ECHO MEAS - EF(MOD-SP2): 58.7 %
BH CV ECHO MEAS - EF(MOD-SP4): 71.9 %
BH CV ECHO MEAS - EF(TEICH): 69.8 %
BH CV ECHO MEAS - ESV(CUBED): 25.7 ML
BH CV ECHO MEAS - ESV(MOD-SP2): 26 ML
BH CV ECHO MEAS - ESV(MOD-SP4): 48 ML
BH CV ECHO MEAS - ESV(TEICH): 33.6 ML
BH CV ECHO MEAS - FS: 39.4 %
BH CV ECHO MEAS - IVS/LVPW: 0.94
BH CV ECHO MEAS - IVSD: 0.98 CM
BH CV ECHO MEAS - LA DIMENSION: 3.9 CM
BH CV ECHO MEAS - LA/AO: 1.1
BH CV ECHO MEAS - LV DIASTOLIC VOL/BSA (35-75): 55.3 ML/M^2
BH CV ECHO MEAS - LV MASS(C)D: 178.1 GRAMS
BH CV ECHO MEAS - LV MASS(C)DI: 57.6 GRAMS/M^2
BH CV ECHO MEAS - LV SYSTOLIC VOL/BSA (12-30): 15.5 ML/M^2
BH CV ECHO MEAS - LVIDD: 4.9 CM
BH CV ECHO MEAS - LVIDS: 3 CM
BH CV ECHO MEAS - LVLD AP2: 7.2 CM
BH CV ECHO MEAS - LVLD AP4: 9.7 CM
BH CV ECHO MEAS - LVLS AP2: 6.7 CM
BH CV ECHO MEAS - LVLS AP4: 7.5 CM
BH CV ECHO MEAS - LVOT AREA (M): 4.5 CM^2
BH CV ECHO MEAS - LVOT AREA: 4.5 CM^2
BH CV ECHO MEAS - LVOT DIAM: 2.4 CM
BH CV ECHO MEAS - LVPWD: 1.1 CM
BH CV ECHO MEAS - SI(CUBED): 29.1 ML/M^2
BH CV ECHO MEAS - SI(MOD-SP2): 12 ML/M^2
BH CV ECHO MEAS - SI(MOD-SP4): 39.8 ML/M^2
BH CV ECHO MEAS - SI(TEICH): 25.1 ML/M^2
BH CV ECHO MEAS - SV(CUBED): 89.8 ML
BH CV ECHO MEAS - SV(MOD-SP2): 37 ML
BH CV ECHO MEAS - SV(MOD-SP4): 123 ML
BH CV ECHO MEAS - SV(TEICH): 77.6 ML
BH CV STRESS BP STAGE 1: NORMAL
BH CV STRESS BP STAGE 2: NORMAL
BH CV STRESS BP STAGE 3: NORMAL
BH CV STRESS BP STAGE 4: NORMAL
BH CV STRESS BP STAGE 5: NORMAL
BH CV STRESS DOSE DOBUTAMINE STAGE 1: 10
BH CV STRESS DOSE DOBUTAMINE STAGE 2: 20
BH CV STRESS DOSE DOBUTAMINE STAGE 3: 30
BH CV STRESS DOSE DOBUTAMINE STAGE 4: 40
BH CV STRESS DOSE DOBUTAMINE STAGE 5: 50
BH CV STRESS DURATION MIN STAGE 1: 2
BH CV STRESS DURATION MIN STAGE 2: 2
BH CV STRESS DURATION MIN STAGE 3: 2
BH CV STRESS DURATION MIN STAGE 4: 2
BH CV STRESS DURATION MIN STAGE 5: 4
BH CV STRESS DURATION SEC STAGE 1: 0
BH CV STRESS DURATION SEC STAGE 2: 0
BH CV STRESS DURATION SEC STAGE 3: 0
BH CV STRESS DURATION SEC STAGE 4: 0
BH CV STRESS DURATION SEC STAGE 5: 33
BH CV STRESS HR STAGE 1: 91
BH CV STRESS HR STAGE 2: 108
BH CV STRESS HR STAGE 3: 132
BH CV STRESS HR STAGE 4: 147
BH CV STRESS HR STAGE 5: 162
BH CV STRESS PROTOCOL 1: NORMAL
BH CV STRESS RECOVERY BP: NORMAL MMHG
BH CV STRESS RECOVERY HR: 109 BPM
BH CV STRESS STAGE 1: 1
BH CV STRESS STAGE 2: 2
BH CV STRESS STAGE 3: 3
BH CV STRESS STAGE 4: 4
BH CV STRESS STAGE 5: 5
BH CV VAS BP RIGHT ARM: NORMAL MMHG
MAXIMAL PREDICTED HEART RATE: 196 BPM
PERCENT MAX PREDICTED HR: 82.65 %
STRESS BASELINE BP: NORMAL MMHG
STRESS BASELINE HR: 96 BPM
STRESS PERCENT HR: 97 %
STRESS POST PEAK BP: NORMAL MMHG
STRESS POST PEAK HR: 162 BPM
STRESS TARGET HR: 167 BPM

## 2020-11-28 DIAGNOSIS — G47.00 INSOMNIA, UNSPECIFIED TYPE: ICD-10-CM

## 2020-11-30 RX ORDER — ZOLPIDEM TARTRATE 5 MG/1
TABLET ORAL
Qty: 30 TABLET | Refills: 0 | Status: SHIPPED | OUTPATIENT
Start: 2020-11-30 | End: 2021-01-14 | Stop reason: SDUPTHER

## 2021-01-14 ENCOUNTER — OFFICE VISIT (OUTPATIENT)
Dept: PULMONOLOGY | Facility: CLINIC | Age: 26
End: 2021-01-14

## 2021-01-14 VITALS
DIASTOLIC BLOOD PRESSURE: 82 MMHG | OXYGEN SATURATION: 98 % | SYSTOLIC BLOOD PRESSURE: 128 MMHG | WEIGHT: 315 LBS | TEMPERATURE: 97.1 F | BODY MASS INDEX: 40.43 KG/M2 | HEART RATE: 86 BPM | RESPIRATION RATE: 18 BRPM | HEIGHT: 74 IN

## 2021-01-14 DIAGNOSIS — E66.01 MORBID OBESITY, UNSPECIFIED OBESITY TYPE (HCC): ICD-10-CM

## 2021-01-14 DIAGNOSIS — G47.00 INSOMNIA, UNSPECIFIED TYPE: Primary | ICD-10-CM

## 2021-01-14 PROCEDURE — 99213 OFFICE O/P EST LOW 20 MIN: CPT | Performed by: INTERNAL MEDICINE

## 2021-01-14 RX ORDER — ZOLPIDEM TARTRATE 5 MG/1
5 TABLET ORAL NIGHTLY PRN
Qty: 30 TABLET | Refills: 2 | Status: SHIPPED | OUTPATIENT
Start: 2021-01-14 | End: 2021-04-15 | Stop reason: SDUPTHER

## 2021-01-14 NOTE — PROGRESS NOTES
"  Chief Complaint   Patient presents with   • Follow-up   • Sleeping Problem       Subjective   Jesús Mijares is a 25 y.o. male.     History of Present Illness   Takes Ambien regularly.    No episodes of sleep walking or other abnormal behaviors.     Goes to bed around 11:30 PM. Wakes up at 9 AM.     No further episodes of daytime sleepiness.     The following portions of the patient's history were reviewed and updated as appropriate: allergies, current medications, past family history, past medical history, past social history and past surgical history.    Review of Systems   HENT: Negative for sinus pressure, sneezing and sore throat.    Respiratory: Negative for cough, shortness of breath and wheezing.        Objective   Visit Vitals  /82   Pulse 86   Temp 97.1 °F (36.2 °C)   Resp 18   Ht 188 cm (74.02\")   Wt (!) 211 kg (465 lb)   SpO2 98%   BMI 59.68 kg/m²       Physical Exam  Vitals signs reviewed.   Constitutional:       Appearance: He is well-developed.   HENT:      Head: Atraumatic.      Mouth/Throat:      Comments: Oropharynx was crowded.  Neck:      Comments: Increased adipose tissue noted.  Musculoskeletal:      Comments: Gait was normal.   Neurological:      Mental Status: He is alert and oriented to person, place, and time.         Assessment/Plan   Diagnoses and all orders for this visit:    1. Insomnia, unspecified type (Primary)  -     zolpidem (AMBIEN) 5 MG tablet; Take 1 tablet by mouth At Night As Needed for Sleep.  Dispense: 30 tablet; Refill: 2    2. Morbid obesity, unspecified obesity type (CMS/HCC)         Return in about 3 months (around 4/14/2021) for Tristan/Lauren, ....Also 6 mths w/Lauren...., ....Also 9-10 mths w/ Dr. Bond.    DISCUSSION (if any):  I reviewed the results of last sleep study in detail. I informed him that the apnea hypopnea index was 0.4 / hr. REM AHI was 0/hour. Supine AHI was 0.4/hour. This was an in lab study. This was done in Dec 2019.    He will be " continued on Ambien as it is definitely helping.    He does not appear to have any evidence of LORRAINE.    Last sleep study does not suggest LORRAINE at all, even in REM sleep or supine sleep, despite having a decent percentage of REM sleep.    Edgar was reviewed.    I spent a total of 22 minutes face to face with this patient. his  pertinent current and previous data, as applicable, were reviewed. Patient's diagnostic studies were also reviewed. More than 20 minutes of the time spent, was spent in counseling about patient's underlying disease process, reviewing any available sleep studies, need to use devices (as applicable) on a regular basis and lifestyle modifications that may positively impact patient's health.         Dictated utilizing Dragon dictation.    This document was electronically signed by Steven Bond MD on 01/14/21 at 11:47 EST

## 2021-04-15 ENCOUNTER — OFFICE VISIT (OUTPATIENT)
Dept: PULMONOLOGY | Facility: CLINIC | Age: 26
End: 2021-04-15

## 2021-04-15 VITALS
RESPIRATION RATE: 16 BRPM | BODY MASS INDEX: 40.43 KG/M2 | OXYGEN SATURATION: 96 % | HEART RATE: 81 BPM | DIASTOLIC BLOOD PRESSURE: 66 MMHG | WEIGHT: 315 LBS | HEIGHT: 74 IN | SYSTOLIC BLOOD PRESSURE: 124 MMHG

## 2021-04-15 DIAGNOSIS — G47.00 INSOMNIA, UNSPECIFIED TYPE: Primary | ICD-10-CM

## 2021-04-15 DIAGNOSIS — E66.01 MORBID OBESITY, UNSPECIFIED OBESITY TYPE (HCC): ICD-10-CM

## 2021-04-15 DIAGNOSIS — Z72.821 POOR SLEEP HYGIENE: ICD-10-CM

## 2021-04-15 PROCEDURE — 99213 OFFICE O/P EST LOW 20 MIN: CPT | Performed by: NURSE PRACTITIONER

## 2021-04-15 RX ORDER — ZOLPIDEM TARTRATE 5 MG/1
5 TABLET ORAL NIGHTLY PRN
Qty: 30 TABLET | Refills: 2 | Status: SHIPPED | OUTPATIENT
Start: 2021-04-15 | End: 2021-07-14 | Stop reason: SDUPTHER

## 2021-04-15 NOTE — PROGRESS NOTES
"Chief Complaint   Patient presents with   • Follow-up   • Sleeping Problem         Subjective   Jesús Mijares is a 25 y.o. male.     History of Present Illness   The patient comes in today for follow-up of insomnia.    He is doing fair with the Ambien and he feels like it has allowed him to get to sleep quicker and sleep longer.    He gets off work about 9:30 PM but does not go to bed until around 1 AM.  He admits that he plays on his phone lying in the bed instead of going to sleep.    He takes Ambien about 1 AM and is able to go to sleep usually by 2 AM.  He gets up for work at 11 AM.  Some days he still feels tired in the mornings but some days he feels rested.    He states he wakes up about 7 AM to get a drink of liquid which he keeps on his nightstand but then he goes right back to sleep with no issue.    The following portions of the patient's history were reviewed and updated as appropriate: allergies, current medications, past family history, past medical history, past social history and past surgical history.    Review of Systems   Constitutional: Negative for chills and fever.   HENT: Negative for rhinorrhea, sinus pressure, sneezing and sore throat.    Respiratory: Negative for cough, shortness of breath and wheezing.    Psychiatric/Behavioral: Negative for sleep disturbance.       Objective   Visit Vitals  /66   Pulse 81   Resp 16   Ht 188 cm (74\")   Wt (!) 211 kg (465 lb)   SpO2 96%   BMI 59.70 kg/m²         Physical Exam  Vitals reviewed.   Constitutional:       Appearance: He is well-developed.   HENT:      Head: Atraumatic.      Mouth/Throat:      Mouth: Mucous membranes are moist.      Comments: Crowded oropharynx.  Eyes:      Extraocular Movements: Extraocular movements intact.   Neck:      Comments: Increased adipose tissue.  Cardiovascular:      Rate and Rhythm: Normal rate and regular rhythm.   Abdominal:      Comments: Obese abdomen.   Musculoskeletal:      Comments: Gait normal. "   Skin:     General: Skin is warm.   Neurological:      Mental Status: He is alert and oriented to person, place, and time.   Psychiatric:         Mood and Affect: Mood normal.             Assessment/Plan   Diagnoses and all orders for this visit:    1. Insomnia, unspecified type (Primary)  -     zolpidem (AMBIEN) 5 MG tablet; Take 1 tablet by mouth At Night As Needed for Sleep.  Dispense: 30 tablet; Refill: 2    2. Morbid obesity, unspecified obesity type (CMS/HCC)    3. Poor sleep hygiene           Return for keep appt in July.    DISCUSSION (if any):  I have discussed with the patient that he needs to work on his sleep hygiene.  I have encouraged him to take the Ambien between 1130 and midnight to allow him to maybe be asleep before 1 a.m.    I have asked him to try not to play on his phone once he lays down in bed.    He does not seem to have any issues waking up the next morning or any side effects specific to Ambien.  He is not sleepwalking or eating in his sleep.    SANDYP/Edgar reviewed.  I have asked him to continue using Ambien 5 mg nightly.    I have discussed bariatric surgery with the patient and he is hesitant but is aware of how the excess weight can affect the body.  I have asked him to think about having bariatric surgery discuss it with his family and if he decides to I will be happy to send a referral.    I have spent 20 minutes face-to-face with the patient discussing his sleep hygiene, Ambien use and its side effects, and bariatric surgery.    Dictated utilizing Dragon dictation.    This document was electronically signed by IRIS Denise April 15, 2021  13:47 EDT

## 2021-05-03 RX ORDER — AMLODIPINE BESYLATE 5 MG/1
5 TABLET ORAL DAILY
Qty: 90 TABLET | Refills: 1 | Status: SHIPPED | OUTPATIENT
Start: 2021-05-03 | End: 2021-08-25 | Stop reason: SDUPTHER

## 2021-05-12 RX ORDER — LISINOPRIL AND HYDROCHLOROTHIAZIDE 20; 12.5 MG/1; MG/1
1 TABLET ORAL DAILY
Qty: 90 TABLET | Refills: 3 | Status: SHIPPED | OUTPATIENT
Start: 2021-05-12 | End: 2021-08-25 | Stop reason: SDUPTHER

## 2021-05-12 NOTE — TELEPHONE ENCOUNTER
Patient needs Lisinopril with htcz refilled. Patient uses [facility] pharmacy in [place].

## 2021-07-14 ENCOUNTER — OFFICE VISIT (OUTPATIENT)
Dept: PULMONOLOGY | Facility: CLINIC | Age: 26
End: 2021-07-14

## 2021-07-14 VITALS
DIASTOLIC BLOOD PRESSURE: 84 MMHG | HEART RATE: 77 BPM | OXYGEN SATURATION: 96 % | SYSTOLIC BLOOD PRESSURE: 132 MMHG | HEIGHT: 74 IN | WEIGHT: 315 LBS | BODY MASS INDEX: 40.43 KG/M2 | RESPIRATION RATE: 18 BRPM

## 2021-07-14 DIAGNOSIS — G47.00 INSOMNIA, UNSPECIFIED TYPE: Primary | ICD-10-CM

## 2021-07-14 DIAGNOSIS — E66.01 MORBID OBESITY, UNSPECIFIED OBESITY TYPE (HCC): ICD-10-CM

## 2021-07-14 DIAGNOSIS — Z72.821 POOR SLEEP HYGIENE: ICD-10-CM

## 2021-07-14 PROCEDURE — 99212 OFFICE O/P EST SF 10 MIN: CPT | Performed by: NURSE PRACTITIONER

## 2021-07-14 RX ORDER — TRIAMCINOLONE ACETONIDE 0.25 MG/G
CREAM TOPICAL
COMMUNITY
Start: 2021-06-22

## 2021-07-14 RX ORDER — ZOLPIDEM TARTRATE 5 MG/1
5 TABLET ORAL NIGHTLY PRN
Qty: 30 TABLET | Refills: 2 | Status: SHIPPED | OUTPATIENT
Start: 2021-07-14 | End: 2021-10-18 | Stop reason: SDUPTHER

## 2021-07-14 NOTE — PROGRESS NOTES
"Chief Complaint   Patient presents with   • Follow-up   • Sleeping Problem         Subjective   Jesús Mijares is a 25 y.o. male.     History of Present Illness   The patient comes in today for insomnia.    He states he has been sleeping well.  He has returned to work so he has more of a schedule to follow now.    He gets home from work around 9 PM.  He goes to bed about midnight and usually takes the Ambien right before he goes to bed.  He states it is usually between 1 and 130 before he falls asleep.  He usually sleeps until 11 AM the next day.  He goes into work at 2 PM.    He states that he will get up to urinate during the night only if he drinks a lot of water.  He wakes up during the night thirsty and does admit to drinking water during the night.    Overall he states he is feeling rested upon awakening and feels he is getting adequate sleep.  He does not have difficulty going back to sleep when he wakes to urinate.    The following portions of the patient's history were reviewed and updated as appropriate: allergies, current medications, past family history, past medical history, past social history and past surgical history.      Review of Systems   Constitutional: Negative for chills and fever.   HENT: Negative for rhinorrhea, sinus pressure, sneezing and sore throat.    Respiratory: Negative for cough, shortness of breath and wheezing.    Psychiatric/Behavioral: Negative for sleep disturbance.       Objective   Visit Vitals  /84   Pulse 77   Resp 18   Ht 188 cm (74\")   Wt (!) 211 kg (465 lb)   SpO2 96%   BMI 59.70 kg/m²         Physical Exam  Vitals reviewed.   Constitutional:       Appearance: He is well-developed.   HENT:      Head: Atraumatic.      Mouth/Throat:      Mouth: Mucous membranes are moist.   Eyes:      Extraocular Movements: Extraocular movements intact.   Abdominal:      Comments: Obese abdomen.   Musculoskeletal:      Comments: Gait normal.   Skin:     General: Skin is warm. "   Neurological:      Mental Status: He is alert and oriented to person, place, and time.             Assessment/Plan   Diagnoses and all orders for this visit:    1. Insomnia, unspecified type (Primary)  -     zolpidem (AMBIEN) 5 MG tablet; Take 1 tablet by mouth At Night As Needed for Sleep.  Dispense: 30 tablet; Refill: 2    2. Morbid obesity, unspecified obesity type (CMS/HCC)    3. Poor sleep hygiene           Return for keep appt in October.    DISCUSSION (if any):  He has returned to work and therefore is keeping on a schedule more so than previously.    I have strongly encouraged him to take the Ambien before midnight when he lays down.  Especially since it seems to take him about an hour to go to sleep I have recommended he take the Ambien between 11 and 1130 and go to bed at midnight.    I have asked him to try not to drink water throughout the night as getting up to urinate will disrupt his sleep as well.  However he does seem to go back to sleep easily.    He has definitely benefited from Ambien and I have asked him to continue using the medication.     His sleep hygiene has improved somewhat.  I have asked him to continue to stay on schedule with bedtime and wake time.    I had reminded him not to use his computer, iPad, smart phone while laying in bed.  He verbalizes understanding    Edgar/PDMP has been reviewed.  Ambien has been sent to [facility] pharmacy.    Dictated utilizing Dragon dictation.    This document was electronically signed by IRIS Denise July 14, 2021  11:13 EDT

## 2021-08-25 ENCOUNTER — OFFICE VISIT (OUTPATIENT)
Dept: CARDIOLOGY | Facility: CLINIC | Age: 26
End: 2021-08-25

## 2021-08-25 VITALS
BODY MASS INDEX: 40.43 KG/M2 | DIASTOLIC BLOOD PRESSURE: 90 MMHG | HEIGHT: 74 IN | HEART RATE: 90 BPM | OXYGEN SATURATION: 98 % | SYSTOLIC BLOOD PRESSURE: 130 MMHG | WEIGHT: 315 LBS

## 2021-08-25 DIAGNOSIS — E66.01 MORBID OBESITY (HCC): ICD-10-CM

## 2021-08-25 DIAGNOSIS — I10 ESSENTIAL HYPERTENSION: Primary | ICD-10-CM

## 2021-08-25 DIAGNOSIS — R07.2 PRECORDIAL PAIN: ICD-10-CM

## 2021-08-25 PROCEDURE — 99213 OFFICE O/P EST LOW 20 MIN: CPT | Performed by: INTERNAL MEDICINE

## 2021-08-25 RX ORDER — AMLODIPINE BESYLATE 5 MG/1
5 TABLET ORAL DAILY
Qty: 90 TABLET | Refills: 4 | Status: SHIPPED | OUTPATIENT
Start: 2021-08-25 | End: 2022-10-25 | Stop reason: SDUPTHER

## 2021-08-25 RX ORDER — CARVEDILOL 25 MG/1
25 TABLET ORAL 2 TIMES DAILY WITH MEALS
Qty: 180 TABLET | Refills: 4 | Status: SHIPPED | OUTPATIENT
Start: 2021-08-25 | End: 2022-11-18 | Stop reason: SDUPTHER

## 2021-08-25 RX ORDER — LISINOPRIL AND HYDROCHLOROTHIAZIDE 20; 12.5 MG/1; MG/1
1 TABLET ORAL DAILY
Qty: 90 TABLET | Refills: 4 | Status: SHIPPED | OUTPATIENT
Start: 2021-08-25 | End: 2022-11-18 | Stop reason: SDUPTHER

## 2021-08-25 NOTE — PROGRESS NOTES
Subjective:     Encounter Date:08/25/2021      Patient ID: Jesús Mijares is a 25 y.o. male.    Chief Complaint: Hypertension  HPI  This is a 25-year-old male patient who presents to cardiology clinic for routine follow-up.  The patient had a normal dobutamine stress echo 1 year ago to evaluate atypical chest pain.  His chest discomfort resolved spontaneously without specific intervention.  The patient reports compliance with his medications.  He has been unable to lose any weight.  The following portions of the patient's history were reviewed and updated as appropriate: allergies, current medications, past family history, past medical history, past social history, past surgical history and problem  Review of Systems   Constitutional: Negative for chills, diaphoresis, fever, malaise/fatigue, weight gain and weight loss.   HENT: Negative for ear discharge, hearing loss, hoarse voice and nosebleeds.    Eyes: Negative for discharge, double vision, pain and photophobia.   Cardiovascular: Negative for chest pain, claudication, cyanosis, dyspnea on exertion, irregular heartbeat, leg swelling, near-syncope, orthopnea, palpitations, paroxysmal nocturnal dyspnea and syncope.   Respiratory: Negative for cough, hemoptysis, shortness of breath, sputum production and wheezing.    Endocrine: Negative for cold intolerance, heat intolerance, polydipsia, polyphagia and polyuria.   Hematologic/Lymphatic: Negative for adenopathy and bleeding problem. Does not bruise/bleed easily.   Skin: Negative for color change, flushing, itching and rash.   Musculoskeletal: Negative for muscle cramps, muscle weakness, myalgias and stiffness.   Gastrointestinal: Negative for abdominal pain, diarrhea, hematemesis, hematochezia, nausea and vomiting.   Genitourinary: Negative for dysuria, frequency and nocturia.   Neurological: Negative for focal weakness, loss of balance, numbness, paresthesias and seizures.   Psychiatric/Behavioral:  "Negative for altered mental status, hallucinations and suicidal ideas.   Allergic/Immunologic: Negative for HIV exposure, hives and persistent infections.           Current Outpatient Medications:   •  amLODIPine (NORVASC) 5 MG tablet, Take 1 tablet by mouth Daily., Disp: 90 tablet, Rfl: 4  •  carvedilol (COREG) 25 MG tablet, Take 1 tablet by mouth 2 (Two) Times a Day With Meals., Disp: 180 tablet, Rfl: 4  •  lisinopril-hydrochlorothiazide (PRINZIDE,ZESTORETIC) 20-12.5 MG per tablet, Take 1 tablet by mouth Daily. 200001, Disp: 90 tablet, Rfl: 4  •  triamcinolone (KENALOG) 0.025 % cream, APPLY TOPICALLY TO THE LEFT THIGH TWICE DAILY, Disp: , Rfl:   •  zolpidem (AMBIEN) 5 MG tablet, Take 1 tablet by mouth At Night As Needed for Sleep., Disp: 30 tablet, Rfl: 2    Objective:   Vitals and nursing note reviewed.   Cardiovascular:      PMI at left midclavicular line. Normal rate. Regular rhythm. Normal S1. Normal S2.      Murmurs: There is no murmur.      No gallop. No click. No rub.   Pulses:     Intact distal pulses.   Edema:     Peripheral edema absent.       Blood pressure 130/90, pulse 90, height 188 cm (74\"), weight (!) 215 kg (474 lb), SpO2 98 %.   Lab Review:     Assessment:       1. Essential hypertension  Acceptable blood pressure control.    2. Morbid obesity (CMS/HCC)  Body mass index is now greater than 60.  The obesity pattern is central.  This is due to excess calorie intake.    3. Precordial pain  Spontaneous resolution.  No evidence of ischemic heart disease.    Procedures    Plan:     Patient has been advised to contact his primary care provider to discuss the option of bariatric surgery.  No changes to his medications were made at today's visit.  No further cardiovascular testing is indicated at this time.      "

## 2021-10-18 ENCOUNTER — OFFICE VISIT (OUTPATIENT)
Dept: PULMONOLOGY | Facility: CLINIC | Age: 26
End: 2021-10-18

## 2021-10-18 VITALS
DIASTOLIC BLOOD PRESSURE: 80 MMHG | HEIGHT: 74 IN | SYSTOLIC BLOOD PRESSURE: 124 MMHG | HEART RATE: 89 BPM | BODY MASS INDEX: 40.43 KG/M2 | WEIGHT: 315 LBS | RESPIRATION RATE: 18 BRPM | OXYGEN SATURATION: 97 %

## 2021-10-18 DIAGNOSIS — G47.00 INSOMNIA, UNSPECIFIED TYPE: ICD-10-CM

## 2021-10-18 DIAGNOSIS — E66.01 MORBID OBESITY, UNSPECIFIED OBESITY TYPE (HCC): Primary | ICD-10-CM

## 2021-10-18 DIAGNOSIS — G47.33 OBSTRUCTIVE SLEEP APNEA: ICD-10-CM

## 2021-10-18 PROCEDURE — 99214 OFFICE O/P EST MOD 30 MIN: CPT | Performed by: INTERNAL MEDICINE

## 2021-10-18 RX ORDER — ZOLPIDEM TARTRATE 5 MG/1
5 TABLET ORAL NIGHTLY PRN
Qty: 30 TABLET | Refills: 2 | Status: SHIPPED | OUTPATIENT
Start: 2021-10-18 | End: 2022-01-28

## 2021-10-18 RX ORDER — ZOLPIDEM TARTRATE 5 MG/1
5 TABLET ORAL NIGHTLY PRN
Qty: 30 TABLET | Refills: 2 | Status: SHIPPED | OUTPATIENT
Start: 2021-10-18 | End: 2021-10-18

## 2021-10-18 NOTE — PROGRESS NOTES
"  Chief Complaint   Patient presents with   • Follow-up   • Sleeping Problem       Subjective   Jesús Mijares is a 25 y.o. male.     History of Present Illness   The patient comes in today to follow-up on insomnia.    Although the patient has been on Ambien in the past, his Ambien was denied by his insurance.      The patient says that he was taking Ambien around 12 MN and would wake up around 1 PM.  He does go to work around 2 PM.    He feels a lot worse without Ambien.    Upon further questioning, he does complain of fatigue, tiredness and snoring.    The following portions of the patient's history were reviewed and updated as appropriate: allergies, current medications, past family history, past medical history, past social history and past surgical history.    Review of Systems   Constitutional: Negative for chills and fever.   HENT: Negative for rhinorrhea, sinus pressure, sneezing and sore throat.    Respiratory: Negative for cough, shortness of breath and wheezing.    Psychiatric/Behavioral: Positive for sleep disturbance.       Objective   Visit Vitals  /80   Pulse 89   Resp 18   Ht 188 cm (74\")   Wt (!) 219 kg (483 lb)   SpO2 97%   BMI 62.01 kg/m²       Physical Exam  Vitals reviewed.   Constitutional:       Appearance: He is well-developed.   HENT:      Head: Atraumatic.      Mouth/Throat:      Comments: Oropharynx was crowded.  Neck:      Comments: Increased adipose tissue noted.  Musculoskeletal:      Comments: Gait was normal.   Neurological:      Mental Status: He is alert and oriented to person, place, and time.         Assessment/Plan   Diagnoses and all orders for this visit:    1. Morbid obesity, unspecified obesity type (HCC) (Primary)  -     Home Sleep Study; Future    2. Insomnia, unspecified type  -     Discontinue: zolpidem (AMBIEN) 5 MG tablet; Take 1 tablet by mouth At Night As Needed for Sleep.  Dispense: 30 tablet; Refill: 2  -     zolpidem (AMBIEN) 5 MG tablet; Take 1 tablet " by mouth At Night As Needed for Sleep.  Dispense: 30 tablet; Refill: 2    3. Obstructive sleep apnea  -     Home Sleep Study; Future           Return in about 3 months (around 1/18/2022) for MayelaLY/Lauren, ...Also 6 mths w/Lauren, ....Also 9 mths w/ Dr. Bond.    DISCUSSION (if any):  I reviewed the patient's latest PDMP/Edgar, he was clearly on Ambien and apparently was doing much better clinically.    Given the above-mentioned reason, I believe he will need to restart Ambien soon as possible.    His insomnia appears to multifactorial.    The patient does have some suggestive symptoms of obstructive sleep apnea therefore I have requested him to undergo Home sleep study on Ambien.      Dictated utilizing Dragon dictation.    This document was electronically signed by Steven Bond MD on 10/18/21 at 10:29 EDT

## 2022-01-27 ENCOUNTER — OFFICE VISIT (OUTPATIENT)
Dept: PULMONOLOGY | Facility: CLINIC | Age: 27
End: 2022-01-27

## 2022-01-27 VITALS
HEART RATE: 85 BPM | RESPIRATION RATE: 18 BRPM | DIASTOLIC BLOOD PRESSURE: 82 MMHG | BODY MASS INDEX: 40.43 KG/M2 | HEIGHT: 74 IN | OXYGEN SATURATION: 97 % | SYSTOLIC BLOOD PRESSURE: 134 MMHG | WEIGHT: 315 LBS

## 2022-01-27 DIAGNOSIS — G47.33 OBSTRUCTIVE SLEEP APNEA: ICD-10-CM

## 2022-01-27 DIAGNOSIS — G47.00 INSOMNIA, UNSPECIFIED TYPE: Primary | ICD-10-CM

## 2022-01-27 DIAGNOSIS — E66.01 MORBID OBESITY, UNSPECIFIED OBESITY TYPE: ICD-10-CM

## 2022-01-27 PROCEDURE — 99212 OFFICE O/P EST SF 10 MIN: CPT | Performed by: NURSE PRACTITIONER

## 2022-01-27 NOTE — PROGRESS NOTES
"Chief Complaint   Patient presents with   • Follow-up   • Sleeping Problem         Subjective   Jesús Mijares is a 26 y.o. male.     History of Present Illness   The patient comes in today for follow-up of insomnia.    He states he has not been taking Ambien for well over 2 months now.  He began to feel tired during the day and decided to stop taking the medication.    He continues to go to bed around midnight and gets up about 1130 am the next day.  He states he does not wake usually during the night.  He does not get off work until about 10 PM.    He is no longer complaining of issues falling asleep even without the medication.    A home sleep study was ordered however he states he has been too busy to complete the study.    The following portions of the patient's history were reviewed and updated as appropriate: allergies, current medications, past family history, past medical history, past social history and past surgical history.    Review of Systems   HENT: Negative for congestion and sore throat.    Respiratory: Negative for apnea, cough, chest tightness, shortness of breath and wheezing.        Objective   Visit Vitals  /82   Pulse 85   Resp 18   Ht 188 cm (74\")   Wt (!) 222 kg (489 lb 6.4 oz)   SpO2 97%   BMI 62.84 kg/m²         Physical Exam  Vitals reviewed.   Constitutional:       Appearance: He is well-developed.   HENT:      Head: Atraumatic.   Neck:      Comments: Increased adipose tissue.  Abdominal:      Comments: Obese abdomen.   Musculoskeletal:      Comments: Gait normal.   Skin:     General: Skin is warm.   Neurological:      Mental Status: He is alert and oriented to person, place, and time.         Assessment/Plan   Diagnoses and all orders for this visit:    1. Insomnia, unspecified type (Primary)    2. Morbid obesity, unspecified obesity type (HCC)    3. Obstructive sleep apnea           Return if symptoms worsen or fail to improve.    DISCUSSION (if any):  He states he is not " having any issues falling asleep or staying asleep at this time and he does not want to complete a home sleep study.    He is no longer needing Ambien to help him fall asleep. He has been off of this medication for well over 2 months.    Since his sleep issues seem to be resolved at this time, I will release him to his PCP and he may follow-up with our clinic if his sleep issues return.      Dictated utilizing Dragon dictation.    This document was electronically signed by IRIS Denise January 28, 2022  12:37 EST

## 2022-04-18 ENCOUNTER — HOSPITAL ENCOUNTER (OUTPATIENT)
Dept: GENERAL RADIOLOGY | Facility: HOSPITAL | Age: 27
Discharge: HOME | End: 2022-04-18
Admitting: NURSE PRACTITIONER

## 2022-04-18 ENCOUNTER — TRANSCRIBE ORDERS (OUTPATIENT)
Dept: GENERAL RADIOLOGY | Facility: HOSPITAL | Age: 27
End: 2022-04-18

## 2022-04-18 DIAGNOSIS — M79.671 RIGHT FOOT PAIN: ICD-10-CM

## 2022-04-18 DIAGNOSIS — M79.671 RIGHT FOOT PAIN: Primary | ICD-10-CM

## 2022-04-18 PROCEDURE — 73630 X-RAY EXAM OF FOOT: CPT

## 2022-10-24 RX ORDER — AMLODIPINE BESYLATE 5 MG/1
TABLET ORAL
Qty: 90 TABLET | Refills: 0 | OUTPATIENT
Start: 2022-10-24

## 2022-10-25 RX ORDER — AMLODIPINE BESYLATE 5 MG/1
5 TABLET ORAL DAILY
Qty: 30 TABLET | Refills: 0 | Status: SHIPPED | OUTPATIENT
Start: 2022-10-25 | End: 2022-11-18 | Stop reason: SDUPTHER

## 2022-10-25 NOTE — TELEPHONE ENCOUNTER
PT HADN'T BEEN SEEN >1 YEAR. SCHEDULED FOR A FOLLOW UP WITH IRIS WORKMAN. FIRST AVAILABLE WAS 11/18/22. SENT PT'S MEDICATION IN FOR A ONE MONTH SUPPLY

## 2022-11-15 NOTE — PROGRESS NOTES
[facility] Cardiology Office Follow Up Note    Jesús Mijares  [id number]  2022    Primary Care Provider: Chantal Gutierrez APRN   Referring Provider: No ref. provider found    Chief Complaint: Needs refills    History of Present Illness:   Mr. Jesús Mijares is a 26 y.o. male who presents to the Cardiology Clinic for medication refills.  The patient states he has been out of a couple of his blood pressure medications for a while.  He has not taken any yet this morning.  He recently started Norvasc and is doing okay with this.  He specifically denies chest pain, dyspnea.  He denies palpitations, dizziness, syncope.  He denies PND.  He offers no other complaints or concerns at this time.    Past Cardiac Testin. Last Coronary Angio: None  2. Prior Stress Testing: Stress Echo 2020  3. Last Echo: Stress Echo 2020  •  Dobutamine stress echocardiogram within normal limits attaining only 83% if target HR.  • Left ventricular ejection fraction appears to be 61 - 65%.  • No significant ST or T wave changes noted.  • Baseline LVEF 60% with normal hyperdynamic response to dobutamine with no segmental wall motion abnormalities.       Left Ventricle Left ventricular ejection fraction appears to be 61 - 65%. Normal left ventricular cavity size and wall thickness noted. All left ventricular wall segments contract normally.   Pericardium There is no evidence of pericardial effusion. .         4. Prior Holter Monitor: None    Review of Systems:   Review of Systems   Constitutional: Negative for activity change, chills, diaphoresis, fatigue, fever and unexpected weight gain.   Eyes: Negative for blurred vision and visual disturbance.   Respiratory: Negative for apnea, cough, chest tightness, shortness of breath and wheezing.    Cardiovascular: Negative for chest pain, palpitations and leg swelling.   Gastrointestinal: Negative for abdominal distention, blood in stool, GERD  "and indigestion.   Endocrine: Negative for cold intolerance and heat intolerance.   Genitourinary: Negative for hematuria.   Musculoskeletal: Negative for gait problem, joint swelling and myalgias.   Skin: Negative for color change, pallor and wound.   Neurological: Negative for dizziness, seizures, syncope, weakness, light-headedness, numbness, headache and confusion.   Hematological: Does not bruise/bleed easily.   Psychiatric/Behavioral: Negative for behavioral problems, sleep disturbance, suicidal ideas and depressed mood.     I have reviewed and confirmed the accuracy of the ROS as documented by the MA/LPN/RN IRIS Clifford    I have reviewed and/or updated the patient's past medical, past surgical, family, social history, problem list and allergies as appropriate.     Medications:     Current Outpatient Medications:   •  amLODIPine (NORVASC) 5 MG tablet, Take 1 tablet by mouth Daily., Disp: 90 tablet, Rfl: 3  •  carvedilol (COREG) 25 MG tablet, Take 1 tablet by mouth 2 (Two) Times a Day With Meals., Disp: 180 tablet, Rfl: 3  •  lisinopril-hydrochlorothiazide (PRINZIDE,ZESTORETIC) 20-12.5 MG per tablet, Take 1 tablet by mouth Daily. 200001, Disp: 90 tablet, Rfl: 3  •  triamcinolone (KENALOG) 0.025 % cream, APPLY TOPICALLY TO THE LEFT THIGH TWICE DAILY, Disp: , Rfl:     Physical Exam:  Vital Signs:   Vitals:    11/18/22 0838   BP: 138/82   BP Location: Right arm   Patient Position: Sitting   Pulse: 82   Resp: 18   SpO2: 99%   Weight: (!) 232 kg (511 lb)   Height: 188 cm (74\")     Body mass index is 65.61 kg/m².    Physical Exam  Vitals and nursing note reviewed.   Constitutional:       General: He is not in acute distress.     Appearance: Normal appearance. He is well-developed.   HENT:      Head: Normocephalic and atraumatic.   Eyes:      General: No scleral icterus.     Extraocular Movements: Extraocular movements intact.   Neck:      Trachea: Trachea normal.   Cardiovascular:      Rate and Rhythm: " Normal rate and regular rhythm.      Pulses: Normal pulses.      Heart sounds: Normal heart sounds. No murmur heard.    No friction rub. No gallop.   Pulmonary:      Effort: Pulmonary effort is normal.      Breath sounds: Normal breath sounds.   Musculoskeletal:         General: Swelling present. Normal range of motion.      Cervical back: Neck supple.      Left lower leg: No edema.      Comments: Nonpitting to BLE   Skin:     General: Skin is warm and dry.      Findings: No bruising, lesion or rash.   Neurological:      Mental Status: He is alert and oriented to person, place, and time.      Motor: No weakness.      Gait: Gait normal.   Psychiatric:         Mood and Affect: Mood normal.         Behavior: Behavior normal. Behavior is cooperative.         Thought Content: Thought content does not include suicidal ideation.         Results Review:   I reviewed the patient's new clinical results.      Assessment / Plan:     1. Essential hypertension (Primary  Suboptimal blood pressure today, but patient has not yet taken his medications  Refill antihypertensives for 1 year  Patient encouraged to take his blood pressure at home and call back to the office if he has any concerns    2. Morbid obesity (HCC)  BMI 65.6 kg/m²      Preventative Cardiology:   Tobacco Cessation: N/A   Advance Care Planning: ACP discussion was declined by the patient. Patient does not have an advance directive, declines further assistance.     Follow Up:   Return in about 1 year (around 11/18/2023) for Follow-up with Dr. Petty.      Thank you for allowing me to participate in the care of your patient. Please to not hesitate to contact me with additional questions or concerns.     IRIS Whitney

## 2022-11-18 ENCOUNTER — OFFICE VISIT (OUTPATIENT)
Dept: CARDIOLOGY | Facility: CLINIC | Age: 27
End: 2022-11-18

## 2022-11-18 VITALS
HEART RATE: 82 BPM | HEIGHT: 74 IN | DIASTOLIC BLOOD PRESSURE: 82 MMHG | WEIGHT: 315 LBS | OXYGEN SATURATION: 99 % | SYSTOLIC BLOOD PRESSURE: 138 MMHG | RESPIRATION RATE: 18 BRPM | BODY MASS INDEX: 40.43 KG/M2

## 2022-11-18 DIAGNOSIS — E66.01 MORBID OBESITY: ICD-10-CM

## 2022-11-18 DIAGNOSIS — I10 ESSENTIAL HYPERTENSION: Primary | ICD-10-CM

## 2022-11-18 PROCEDURE — 99213 OFFICE O/P EST LOW 20 MIN: CPT | Performed by: NURSE PRACTITIONER

## 2022-11-18 RX ORDER — DOXYCYCLINE 100 MG/1
100 CAPSULE ORAL 2 TIMES DAILY
COMMUNITY
Start: 2022-08-18 | End: 2022-11-18

## 2022-11-18 RX ORDER — CARVEDILOL 25 MG/1
25 TABLET ORAL 2 TIMES DAILY WITH MEALS
Qty: 180 TABLET | Refills: 3 | Status: SHIPPED | OUTPATIENT
Start: 2022-11-18

## 2022-11-18 RX ORDER — AMLODIPINE BESYLATE 5 MG/1
5 TABLET ORAL DAILY
Qty: 90 TABLET | Refills: 3 | Status: SHIPPED | OUTPATIENT
Start: 2022-11-18

## 2022-11-18 RX ORDER — LISINOPRIL AND HYDROCHLOROTHIAZIDE 20; 12.5 MG/1; MG/1
1 TABLET ORAL DAILY
Qty: 90 TABLET | Refills: 3 | Status: SHIPPED | OUTPATIENT
Start: 2022-11-18

## 2023-06-06 ENCOUNTER — HOSPITAL ENCOUNTER (OUTPATIENT)
Dept: ULTRASOUND IMAGING | Facility: HOSPITAL | Age: 28
Discharge: HOME | End: 2023-06-06
Admitting: NURSE PRACTITIONER
Payer: COMMERCIAL

## 2023-06-06 ENCOUNTER — TRANSCRIBE ORDERS (OUTPATIENT)
Dept: ADMINISTRATIVE | Facility: HOSPITAL | Age: 28
End: 2023-06-06
Payer: COMMERCIAL

## 2023-06-06 DIAGNOSIS — M79.605 LEFT LEG PAIN: ICD-10-CM

## 2023-06-06 DIAGNOSIS — R60.9 EDEMA, UNSPECIFIED TYPE: ICD-10-CM

## 2023-06-06 DIAGNOSIS — M79.604 RIGHT LEG PAIN: ICD-10-CM

## 2023-06-06 DIAGNOSIS — R60.9 EDEMA, UNSPECIFIED TYPE: Primary | ICD-10-CM

## 2023-06-06 PROCEDURE — 93970 EXTREMITY STUDY: CPT

## 2023-09-07 ENCOUNTER — OFFICE VISIT (OUTPATIENT)
Dept: PULMONOLOGY | Facility: CLINIC | Age: 28
End: 2023-09-07
Payer: COMMERCIAL

## 2023-09-07 VITALS
BODY MASS INDEX: 40.43 KG/M2 | WEIGHT: 315 LBS | HEIGHT: 74 IN | RESPIRATION RATE: 18 BRPM | DIASTOLIC BLOOD PRESSURE: 78 MMHG | HEART RATE: 87 BPM | SYSTOLIC BLOOD PRESSURE: 132 MMHG | OXYGEN SATURATION: 97 %

## 2023-09-07 DIAGNOSIS — G47.19 EXCESSIVE DAYTIME SLEEPINESS: ICD-10-CM

## 2023-09-07 DIAGNOSIS — E66.01 MORBID OBESITY, UNSPECIFIED OBESITY TYPE: ICD-10-CM

## 2023-09-07 DIAGNOSIS — G47.33 OBSTRUCTIVE SLEEP APNEA: Primary | ICD-10-CM

## 2023-09-07 NOTE — PROGRESS NOTES
"  Chief Complaint   Patient presents with    Sleeping Problem    Follow-up       Subjective   Jesús Mijares is a 27 y.o. male.   Patient comes in today to follow-up on sleep disturbance.    The patient says that he weaned himself off Ambien.  He continues to sleep 6 to 7 hours a night, going to bed around 1 AM on occasion.    He said that he wakes up in the morning at 7 AM to do some sort of treatment for his varicose veins before going to work.    The patient says that he has been snoring \"a lot more\".  He also notices that he is more tired and feels that the fatigue has worsened over the past few months as well.      The following portions of the patient's history were reviewed and updated as appropriate: allergies, current medications, past family history, past medical history, past social history, and past surgical history.    Review of Systems   HENT:  Negative for sinus pressure, sneezing and sore throat.    Respiratory:  Negative for cough, chest tightness, shortness of breath and wheezing.    Cardiovascular:  Negative for palpitations and leg swelling.   Psychiatric/Behavioral:  Positive for sleep disturbance.      Objective   Visit Vitals  /78   Pulse 87   Resp 18   Ht 188 cm (74.02\")   Wt (!) 252 kg (554 lb 12.8 oz)   SpO2 97%   BMI 71.20 kg/m²       Physical Exam  Vitals reviewed.   Constitutional:       Appearance: He is well-developed.   HENT:      Head: Atraumatic.      Mouth/Throat:      Comments: Oropharynx was crowded.  Neck:      Comments: Increased neck circumference noted.  Musculoskeletal:      Comments: Gait was normal.   Neurological:      Mental Status: He is alert and oriented to person, place, and time.           Assessment & Plan   Diagnoses and all orders for this visit:    1. Obstructive sleep apnea (Primary)  -     Home Sleep Study; Future    2. Morbid obesity, unspecified obesity type  -     Home Sleep Study; Future    3. Excessive daytime sleepiness  -     Home Sleep " Study; Future           Return in about 28 weeks (around 3/21/2024) for Tristan/Lauren.    DISCUSSION (if any):  Laboratory workup was also reviewed which showed   Lab Results   Component Value Date    CO2 29.1 (H) 11/01/2019   ,   Lab Results   Component Value Date    HGBA1C 5.50 11/01/2019    HGBA1C 5.5 04/22/2019    HGBA1C 5.4 10/07/2018     Laboratory workup also showed   Lab Results   Component Value Date    HGB 12.5 (L) 11/01/2019    HGB 13.8 04/22/2019    HGB 12.3 (L) 10/07/2018   ,   Lab Results   Component Value Date    HCT 38.4 11/01/2019    HCT 42.0 04/22/2019    HCT 38.5 (L) 10/07/2018   ,   Lab Results   Component Value Date    TSH 2.180 11/01/2019    TSH 1.280 04/22/2019    TSH 0.815 10/07/2018       The pathophysiology of sleep apnea was discussed, with the patient.     We will encourage him to schedule the sleep study soon.     The patient was made aware of the limitation of the home sleep study, whereby it may underestimate the true AHI and also carries a low sensitivity.  I have informed him that even if the home sleep study is negative, we may suggest an in lab sleep study to completely and definitively rule out/in sleep apnea.  The patient has understood.  This was communicated to the patient, in case home study is to be requested.    The patient is agreeable to try CPAP/BiPAP, if needed.     If the patient's home sleep study does not provide information with regards to sleep apnea, then he will definitely need to be considered for in lab study.    Patient was educated on good sleep hygiene measures and voiced understanding of the same.     Patient was given reading material regarding sleep apnea    Patient was counseled regarding weight loss.         Dictated utilizing Dragon dictation.    This document was electronically signed by Steven Bond MD on 09/07/23 at 10:10 EDT

## 2023-09-25 ENCOUNTER — HOSPITAL ENCOUNTER (OUTPATIENT)
Dept: SLEEP MEDICINE | Facility: HOSPITAL | Age: 28
Discharge: HOME | End: 2023-09-25
Admitting: INTERNAL MEDICINE
Payer: COMMERCIAL

## 2023-09-25 DIAGNOSIS — G47.33 OBSTRUCTIVE SLEEP APNEA: ICD-10-CM

## 2023-09-25 DIAGNOSIS — G47.19 EXCESSIVE DAYTIME SLEEPINESS: ICD-10-CM

## 2023-09-25 DIAGNOSIS — E66.01 MORBID OBESITY, UNSPECIFIED OBESITY TYPE: ICD-10-CM

## 2023-09-25 PROCEDURE — 95806 SLEEP STUDY UNATT&RESP EFFT: CPT

## 2023-10-03 DIAGNOSIS — G47.33 OSA (OBSTRUCTIVE SLEEP APNEA): Primary | ICD-10-CM

## 2023-11-09 DIAGNOSIS — I10 ESSENTIAL HYPERTENSION: ICD-10-CM

## 2023-11-09 RX ORDER — AMLODIPINE BESYLATE 5 MG/1
5 TABLET ORAL DAILY
Qty: 30 TABLET | Refills: 2 | Status: SHIPPED | OUTPATIENT
Start: 2023-11-09

## 2023-11-09 RX ORDER — LISINOPRIL AND HYDROCHLOROTHIAZIDE 20; 12.5 MG/1; MG/1
1 TABLET ORAL DAILY
Qty: 30 TABLET | Refills: 2 | Status: SHIPPED | OUTPATIENT
Start: 2023-11-09

## 2023-11-09 RX ORDER — CARVEDILOL 25 MG/1
25 TABLET ORAL 2 TIMES DAILY WITH MEALS
Qty: 60 TABLET | Refills: 2 | Status: SHIPPED | OUTPATIENT
Start: 2023-11-09

## 2023-11-17 ENCOUNTER — OFFICE VISIT (OUTPATIENT)
Dept: CARDIOLOGY | Facility: CLINIC | Age: 28
End: 2023-11-17
Payer: COMMERCIAL

## 2023-11-17 VITALS
WEIGHT: 315 LBS | HEIGHT: 74 IN | BODY MASS INDEX: 40.43 KG/M2 | DIASTOLIC BLOOD PRESSURE: 80 MMHG | OXYGEN SATURATION: 97 % | SYSTOLIC BLOOD PRESSURE: 120 MMHG | HEART RATE: 83 BPM

## 2023-11-17 DIAGNOSIS — E66.01 MORBID OBESITY: ICD-10-CM

## 2023-11-17 DIAGNOSIS — I27.81 COR PULMONALE (CHRONIC): ICD-10-CM

## 2023-11-17 DIAGNOSIS — I10 ESSENTIAL HYPERTENSION: Primary | ICD-10-CM

## 2023-11-17 NOTE — PROGRESS NOTES
Subjective:     Encounter Date:11/17/2023      Patient ID: Jesús Mijares is a 27 y.o. male.    Chief Complaint:  HPI    The following portions of the patient's history were reviewed and updated as appropriate: allergies, current medications, past family history, past medical history, past social history, past surgical history and problem  Review of Systems   Constitutional: Negative for chills, diaphoresis, fever, malaise/fatigue, weight gain and weight loss.   HENT:  Negative for ear discharge, hearing loss, hoarse voice and nosebleeds.    Eyes:  Negative for discharge, double vision, pain and photophobia.   Cardiovascular:  Positive for leg swelling. Negative for chest pain, claudication, cyanosis, dyspnea on exertion, irregular heartbeat, near-syncope, orthopnea, palpitations, paroxysmal nocturnal dyspnea and syncope.   Respiratory:  Negative for cough, hemoptysis, sputum production and wheezing.    Endocrine: Negative for cold intolerance, heat intolerance, polydipsia, polyphagia and polyuria.   Hematologic/Lymphatic: Negative for adenopathy and bleeding problem. Does not bruise/bleed easily.   Skin:  Negative for color change, flushing, itching and rash.   Musculoskeletal:  Negative for muscle cramps, muscle weakness, myalgias and stiffness.   Gastrointestinal:  Negative for abdominal pain, diarrhea, hematemesis, hematochezia, nausea and vomiting.   Genitourinary:  Negative for dysuria, frequency and nocturia.   Neurological:  Negative for focal weakness, loss of balance, numbness, paresthesias and seizures.   Psychiatric/Behavioral:  Negative for altered mental status, hallucinations and suicidal ideas.    Allergic/Immunologic: Negative for HIV exposure, hives and persistent infections.           Current Outpatient Medications:     amLODIPine (NORVASC) 5 MG tablet, Take 1 tablet by mouth Daily., Disp: 30 tablet, Rfl: 2    carvedilol (COREG) 25 MG tablet, Take 1 tablet by mouth 2 (Two) Times a Day  "With Meals., Disp: 60 tablet, Rfl: 2    lisinopril-hydrochlorothiazide (PRINZIDE,ZESTORETIC) 20-12.5 MG per tablet, Take 1 tablet by mouth Daily. 200001, Disp: 30 tablet, Rfl: 2    triamcinolone (KENALOG) 0.025 % cream, APPLY TOPICALLY TO THE LEFT THIGH TWICE DAILY, Disp: , Rfl:     Objective:   Vitals and nursing note reviewed.   Constitutional:       Appearance: Healthy appearance. Not in distress.   Neck:      Vascular: No JVR. JVD normal.   Pulmonary:      Effort: Pulmonary effort is normal.      Breath sounds: Normal breath sounds. No wheezing. No rhonchi. No rales.   Chest:      Chest wall: Not tender to palpatation.   Cardiovascular:      PMI at left midclavicular line. Normal rate. Regular rhythm. Normal S1. Normal S2.       Murmurs: There is no murmur.      No gallop.  No click. No rub.   Pulses:     Intact distal pulses.   Edema:     Peripheral edema present.  Abdominal:      General: Bowel sounds are normal.      Palpations: Abdomen is soft.      Tenderness: There is no abdominal tenderness.   Musculoskeletal: Normal range of motion.         General: No tenderness. Skin:     General: Skin is warm and dry.   Neurological:      General: No focal deficit present.      Mental Status: Alert and oriented to person, place and time.       Blood pressure 120/80, pulse 83, height 188 cm (74\"), weight (!) 258 kg (569 lb), SpO2 97%.   Lab Review:     Assessment:       1. Essential hypertension  Acceptable blood pressure control.    2. Morbid obesity  Body mass index is now greater than 73.  He is approaching 600 pounds in weight.  This is due to excess calorie intake.    3. Cor pulmonale (chronic)  The patient demonstrates evidence of chronic right heart failure due to morbid obesity, obstructive sleep apnea and obesity related alveolar hypoventilation syndrome.  In addition, given his body habitus I suspect he also has a restrictive defect related to impaired diaphragmatic excursion.    Procedures    Plan:     As " previously mentioned, the patient's only hope of improvement revolves around dramatic weight loss.  He does not appear to have moved forward regarding bariatric surgery.    Nothing further from a cardiovascular standpoint.

## 2024-02-12 DIAGNOSIS — I10 ESSENTIAL HYPERTENSION: ICD-10-CM

## 2024-02-13 RX ORDER — AMLODIPINE BESYLATE 5 MG/1
5 TABLET ORAL DAILY
Qty: 30 TABLET | Refills: 0 | OUTPATIENT
Start: 2024-02-13

## 2024-02-13 RX ORDER — LISINOPRIL AND HYDROCHLOROTHIAZIDE 20; 12.5 MG/1; MG/1
1 TABLET ORAL DAILY
Qty: 30 TABLET | Refills: 2 | Status: SHIPPED | OUTPATIENT
Start: 2024-02-13

## 2024-02-13 RX ORDER — AMLODIPINE BESYLATE 5 MG/1
5 TABLET ORAL DAILY
Qty: 30 TABLET | Refills: 2 | Status: SHIPPED | OUTPATIENT
Start: 2024-02-13

## 2024-02-13 RX ORDER — CARVEDILOL 25 MG/1
25 TABLET ORAL 2 TIMES DAILY WITH MEALS
Qty: 60 TABLET | Refills: 0 | OUTPATIENT
Start: 2024-02-13

## 2024-02-13 RX ORDER — CARVEDILOL 25 MG/1
25 TABLET ORAL 2 TIMES DAILY WITH MEALS
Qty: 60 TABLET | Refills: 2 | Status: SHIPPED | OUTPATIENT
Start: 2024-02-13

## 2024-02-13 RX ORDER — LISINOPRIL AND HYDROCHLOROTHIAZIDE 20; 12.5 MG/1; MG/1
1 TABLET ORAL DAILY
Qty: 30 TABLET | Refills: 0 | OUTPATIENT
Start: 2024-02-13

## 2024-03-02 ENCOUNTER — HOSPITAL ENCOUNTER (INPATIENT)
Facility: HOSPITAL | Age: 29
LOS: 2 days | Discharge: HOME | End: 2024-03-05
Attending: EMERGENCY MEDICINE | Admitting: INTERNAL MEDICINE
Payer: COMMERCIAL

## 2024-03-02 ENCOUNTER — APPOINTMENT (OUTPATIENT)
Dept: GENERAL RADIOLOGY | Facility: HOSPITAL | Age: 29
End: 2024-03-02
Payer: COMMERCIAL

## 2024-03-02 DIAGNOSIS — E66.01 MORBID OBESITY: ICD-10-CM

## 2024-03-02 DIAGNOSIS — J45.909 ASTHMA, UNSPECIFIED ASTHMA SEVERITY, UNSPECIFIED WHETHER COMPLICATED, UNSPECIFIED WHETHER PERSISTENT: ICD-10-CM

## 2024-03-02 DIAGNOSIS — B33.8 RSV INFECTION: Primary | ICD-10-CM

## 2024-03-02 DIAGNOSIS — I26.99 OTHER ACUTE PULMONARY EMBOLISM, UNSPECIFIED WHETHER ACUTE COR PULMONALE PRESENT: ICD-10-CM

## 2024-03-02 DIAGNOSIS — I27.81 COR PULMONALE (CHRONIC): ICD-10-CM

## 2024-03-02 DIAGNOSIS — R09.02 HYPOXIA: ICD-10-CM

## 2024-03-02 LAB
ALBUMIN SERPL-MCNC: 4 G/DL (ref 3.5–5.2)
ALBUMIN/GLOB SERPL: 1 G/DL
ALP SERPL-CCNC: 96 U/L (ref 39–117)
ALT SERPL W P-5'-P-CCNC: 20 U/L (ref 1–41)
ANION GAP SERPL CALCULATED.3IONS-SCNC: 11.3 MMOL/L (ref 5–15)
ANISOCYTOSIS BLD QL: NORMAL
AST SERPL-CCNC: 16 U/L (ref 1–40)
BASOPHILS # BLD AUTO: 0.05 10*3/MM3 (ref 0–0.2)
BASOPHILS NFR BLD AUTO: 0.4 % (ref 0–1.5)
BILIRUB SERPL-MCNC: 0.5 MG/DL (ref 0–1.2)
BUN SERPL-MCNC: 12 MG/DL (ref 6–20)
BUN/CREAT SERPL: 13.2 (ref 7–25)
CALCIUM SPEC-SCNC: 9.2 MG/DL (ref 8.6–10.5)
CHLORIDE SERPL-SCNC: 99 MMOL/L (ref 98–107)
CO2 SERPL-SCNC: 23.7 MMOL/L (ref 22–29)
CREAT SERPL-MCNC: 0.91 MG/DL (ref 0.76–1.27)
DEPRECATED RDW RBC AUTO: 44.6 FL (ref 37–54)
EGFRCR SERPLBLD CKD-EPI 2021: 117.7 ML/MIN/1.73
EOSINOPHIL # BLD AUTO: 0.18 10*3/MM3 (ref 0–0.4)
EOSINOPHIL NFR BLD AUTO: 1.3 % (ref 0.3–6.2)
ERYTHROCYTE [DISTWIDTH] IN BLOOD BY AUTOMATED COUNT: 18.2 % (ref 12.3–15.4)
GLOBULIN UR ELPH-MCNC: 3.9 GM/DL
GLUCOSE SERPL-MCNC: 99 MG/DL (ref 65–99)
HCT VFR BLD AUTO: 37 % (ref 37.5–51)
HGB BLD-MCNC: 11.6 G/DL (ref 13–17.7)
IMM GRANULOCYTES # BLD AUTO: 0.13 10*3/MM3 (ref 0–0.05)
IMM GRANULOCYTES NFR BLD AUTO: 1 % (ref 0–0.5)
LYMPHOCYTES # BLD AUTO: 1.84 10*3/MM3 (ref 0.7–3.1)
LYMPHOCYTES NFR BLD AUTO: 13.6 % (ref 19.6–45.3)
MCH RBC QN AUTO: 22.4 PG (ref 26.6–33)
MCHC RBC AUTO-ENTMCNC: 31.4 G/DL (ref 31.5–35.7)
MCV RBC AUTO: 71.4 FL (ref 79–97)
MICROCYTES BLD QL: NORMAL
MONOCYTES # BLD AUTO: 0.84 10*3/MM3 (ref 0.1–0.9)
MONOCYTES NFR BLD AUTO: 6.2 % (ref 5–12)
NEUTROPHILS NFR BLD AUTO: 10.44 10*3/MM3 (ref 1.7–7)
NEUTROPHILS NFR BLD AUTO: 77.5 % (ref 42.7–76)
NRBC BLD AUTO-RTO: 0 /100 WBC (ref 0–0.2)
NT-PROBNP SERPL-MCNC: 181.4 PG/ML (ref 0–450)
PLATELET # BLD AUTO: 249 10*3/MM3 (ref 140–450)
PMV BLD AUTO: 9.3 FL (ref 6–12)
POTASSIUM SERPL-SCNC: 4.4 MMOL/L (ref 3.5–5.2)
PROT SERPL-MCNC: 7.9 G/DL (ref 6–8.5)
RBC # BLD AUTO: 5.18 10*6/MM3 (ref 4.14–5.8)
SMALL PLATELETS BLD QL SMEAR: ADEQUATE
SODIUM SERPL-SCNC: 134 MMOL/L (ref 136–145)
TROPONIN T SERPL HS-MCNC: 21 NG/L
WBC MORPH BLD: NORMAL
WBC NRBC COR # BLD AUTO: 13.48 10*3/MM3 (ref 3.4–10.8)

## 2024-03-02 PROCEDURE — 80053 COMPREHEN METABOLIC PANEL: CPT | Performed by: EMERGENCY MEDICINE

## 2024-03-02 PROCEDURE — 84466 ASSAY OF TRANSFERRIN: CPT | Performed by: INTERNAL MEDICINE

## 2024-03-02 PROCEDURE — 99285 EMERGENCY DEPT VISIT HI MDM: CPT

## 2024-03-02 PROCEDURE — 93005 ELECTROCARDIOGRAM TRACING: CPT

## 2024-03-02 PROCEDURE — 85379 FIBRIN DEGRADATION QUANT: CPT | Performed by: NURSE PRACTITIONER

## 2024-03-02 PROCEDURE — 36415 COLL VENOUS BLD VENIPUNCTURE: CPT

## 2024-03-02 PROCEDURE — 84145 PROCALCITONIN (PCT): CPT | Performed by: NURSE PRACTITIONER

## 2024-03-02 PROCEDURE — 85007 BL SMEAR W/DIFF WBC COUNT: CPT | Performed by: EMERGENCY MEDICINE

## 2024-03-02 PROCEDURE — 71045 X-RAY EXAM CHEST 1 VIEW: CPT

## 2024-03-02 PROCEDURE — 83880 ASSAY OF NATRIURETIC PEPTIDE: CPT | Performed by: EMERGENCY MEDICINE

## 2024-03-02 PROCEDURE — 25010000002 DEXAMETHASONE SODIUM PHOSPHATE 10 MG/ML SOLUTION: Performed by: NURSE PRACTITIONER

## 2024-03-02 PROCEDURE — 84484 ASSAY OF TROPONIN QUANT: CPT | Performed by: EMERGENCY MEDICINE

## 2024-03-02 PROCEDURE — 94640 AIRWAY INHALATION TREATMENT: CPT

## 2024-03-02 PROCEDURE — 85025 COMPLETE CBC W/AUTO DIFF WBC: CPT

## 2024-03-02 PROCEDURE — 83540 ASSAY OF IRON: CPT | Performed by: INTERNAL MEDICINE

## 2024-03-02 PROCEDURE — 93005 ELECTROCARDIOGRAM TRACING: CPT | Performed by: EMERGENCY MEDICINE

## 2024-03-02 RX ORDER — IPRATROPIUM BROMIDE AND ALBUTEROL SULFATE 2.5; .5 MG/3ML; MG/3ML
3 SOLUTION RESPIRATORY (INHALATION) ONCE
Status: COMPLETED | OUTPATIENT
Start: 2024-03-02 | End: 2024-03-02

## 2024-03-02 RX ORDER — DEXAMETHASONE SODIUM PHOSPHATE 10 MG/ML
10 INJECTION, SOLUTION INTRAMUSCULAR; INTRAVENOUS ONCE
Status: COMPLETED | OUTPATIENT
Start: 2024-03-02 | End: 2024-03-02

## 2024-03-02 RX ORDER — SODIUM CHLORIDE 0.9 % (FLUSH) 0.9 %
10 SYRINGE (ML) INJECTION AS NEEDED
Status: DISCONTINUED | OUTPATIENT
Start: 2024-03-02 | End: 2024-03-05 | Stop reason: HOSPADM

## 2024-03-02 RX ADMIN — IPRATROPIUM BROMIDE AND ALBUTEROL SULFATE 3 ML: .5; 3 SOLUTION RESPIRATORY (INHALATION) at 23:27

## 2024-03-02 RX ADMIN — DEXAMETHASONE SODIUM PHOSPHATE 10 MG: 10 INJECTION INTRAMUSCULAR; INTRAVENOUS at 23:39

## 2024-03-02 NOTE — Clinical Note
Level of Care: Telemetry [5]   Diagnosis: Hypoxemia [799.02.ICD-9-CM]   Admitting Physician: LUIS HEWITT [[id number]]   Attending Physician: LUIS HEWITT [[id number]]

## 2024-03-03 ENCOUNTER — APPOINTMENT (OUTPATIENT)
Dept: CT IMAGING | Facility: HOSPITAL | Age: 29
End: 2024-03-03
Payer: COMMERCIAL

## 2024-03-03 ENCOUNTER — APPOINTMENT (OUTPATIENT)
Dept: CARDIOLOGY | Facility: HOSPITAL | Age: 29
End: 2024-03-03
Payer: COMMERCIAL

## 2024-03-03 PROBLEM — R79.89 POSITIVE D DIMER: Status: ACTIVE | Noted: 2024-03-03

## 2024-03-03 PROBLEM — R09.02 HYPOXEMIA: Status: ACTIVE | Noted: 2024-03-03

## 2024-03-03 PROBLEM — J20.9 ACUTE BRONCHITIS: Status: ACTIVE | Noted: 2024-03-03

## 2024-03-03 PROBLEM — D50.9 MICROCYTIC ANEMIA: Status: ACTIVE | Noted: 2024-03-03

## 2024-03-03 PROBLEM — J45.909 ASTHMA: Status: ACTIVE | Noted: 2024-03-03

## 2024-03-03 LAB
ALBUMIN SERPL-MCNC: 3.9 G/DL (ref 3.5–5.2)
ALBUMIN/GLOB SERPL: 1 G/DL
ALP SERPL-CCNC: 101 U/L (ref 39–117)
ALT SERPL W P-5'-P-CCNC: 19 U/L (ref 1–41)
ANION GAP SERPL CALCULATED.3IONS-SCNC: 13.8 MMOL/L (ref 5–15)
AST SERPL-CCNC: 15 U/L (ref 1–40)
B PARAPERT DNA SPEC QL NAA+PROBE: NOT DETECTED
B PERT DNA SPEC QL NAA+PROBE: NOT DETECTED
BH CV ECHO MEAS - AO ROOT DIAM: 3.1 CM
BH CV ECHO MEAS - EDV(CUBED): 40 ML
BH CV ECHO MEAS - ESV(CUBED): 12 ML
BH CV ECHO MEAS - FS: 33 %
BH CV ECHO MEAS - IVS/LVPW: 0.98 CM
BH CV ECHO MEAS - IVSD: 1.32 CM
BH CV ECHO MEAS - LA DIMENSION: 4.3 CM
BH CV ECHO MEAS - LV MASS(C)D: 155.2 GRAMS
BH CV ECHO MEAS - LVIDD: 3.4 CM
BH CV ECHO MEAS - LVIDS: 2.29 CM
BH CV ECHO MEAS - LVOT AREA: 4.4 CM2
BH CV ECHO MEAS - LVOT DIAM: 2.38 CM
BH CV ECHO MEAS - LVPWD: 1.35 CM
BH CV ECHO MEAS - MED PEAK E' VEL: 20.7 CM/SEC
BH CV ECHO MEAS - MV DEC SLOPE: 652 CM/SEC2
BH CV ECHO MEAS - MV DEC TIME: 0.19 SEC
BH CV ECHO MEAS - MV E MAX VEL: 122 CM/SEC
BH CV ECHO MEAS - MV MAX PG: 8.1 MMHG
BH CV ECHO MEAS - MV MEAN PG: 5 MMHG
BH CV ECHO MEAS - MV V2 VTI: 24.7 CM
BH CV ECHO MEAS - PA ACC TIME: 0.15 SEC
BH CV ECHO MEAS - PA V2 MAX: 121 CM/SEC
BH CV ECHO MEAS - RV MAX PG: 2.02 MMHG
BH CV ECHO MEAS - RV V1 MAX: 71.1 CM/SEC
BH CV ECHO MEAS - RV V1 VTI: 12.2 CM
BILIRUB SERPL-MCNC: 0.5 MG/DL (ref 0–1.2)
BUN SERPL-MCNC: 16 MG/DL (ref 6–20)
BUN/CREAT SERPL: 16.5 (ref 7–25)
C PNEUM DNA NPH QL NAA+NON-PROBE: NOT DETECTED
CALCIUM SPEC-SCNC: 9.2 MG/DL (ref 8.6–10.5)
CHLORIDE SERPL-SCNC: 98 MMOL/L (ref 98–107)
CO2 SERPL-SCNC: 20.2 MMOL/L (ref 22–29)
CREAT SERPL-MCNC: 0.97 MG/DL (ref 0.76–1.27)
D DIMER PPP FEU-MCNC: 3.98 MCGFEU/ML (ref 0–0.5)
DEPRECATED RDW RBC AUTO: 44.2 FL (ref 37–54)
EGFRCR SERPLBLD CKD-EPI 2021: 109 ML/MIN/1.73
ERYTHROCYTE [DISTWIDTH] IN BLOOD BY AUTOMATED COUNT: 18.5 % (ref 12.3–15.4)
FLUAV SUBTYP SPEC NAA+PROBE: NOT DETECTED
FLUBV RNA ISLT QL NAA+PROBE: NOT DETECTED
GLOBULIN UR ELPH-MCNC: 3.9 GM/DL
GLUCOSE SERPL-MCNC: 171 MG/DL (ref 65–99)
HADV DNA SPEC NAA+PROBE: NOT DETECTED
HCOV 229E RNA SPEC QL NAA+PROBE: NOT DETECTED
HCOV HKU1 RNA SPEC QL NAA+PROBE: NOT DETECTED
HCOV NL63 RNA SPEC QL NAA+PROBE: NOT DETECTED
HCOV OC43 RNA SPEC QL NAA+PROBE: NOT DETECTED
HCT VFR BLD AUTO: 36.2 % (ref 37.5–51)
HGB BLD-MCNC: 11.3 G/DL (ref 13–17.7)
HMPV RNA NPH QL NAA+NON-PROBE: NOT DETECTED
HOLD SPECIMEN: NORMAL
HOLD SPECIMEN: NORMAL
HPIV1 RNA ISLT QL NAA+PROBE: NOT DETECTED
HPIV2 RNA SPEC QL NAA+PROBE: NOT DETECTED
HPIV3 RNA NPH QL NAA+PROBE: NOT DETECTED
HPIV4 P GENE NPH QL NAA+PROBE: NOT DETECTED
INR PPP: 1.08 (ref 0.9–1.1)
IRON 24H UR-MRATE: 32 MCG/DL (ref 59–158)
IRON SATN MFR SERPL: 8 % (ref 20–50)
M PNEUMO IGG SER IA-ACNC: NOT DETECTED
MCH RBC QN AUTO: 21.9 PG (ref 26.6–33)
MCHC RBC AUTO-ENTMCNC: 31.2 G/DL (ref 31.5–35.7)
MCV RBC AUTO: 70.2 FL (ref 79–97)
PLATELET # BLD AUTO: 246 10*3/MM3 (ref 140–450)
PMV BLD AUTO: 9.8 FL (ref 6–12)
POTASSIUM SERPL-SCNC: 4.6 MMOL/L (ref 3.5–5.2)
PROCALCITONIN SERPL-MCNC: 0.07 NG/ML (ref 0–0.25)
PROT SERPL-MCNC: 7.8 G/DL (ref 6–8.5)
PROTHROMBIN TIME: 14.6 SECONDS (ref 12.3–15.1)
RBC # BLD AUTO: 5.16 10*6/MM3 (ref 4.14–5.8)
RHINOVIRUS RNA SPEC NAA+PROBE: NOT DETECTED
RSV RNA NPH QL NAA+NON-PROBE: NOT DETECTED
SARS-COV-2 RNA NPH QL NAA+NON-PROBE: NOT DETECTED
SODIUM SERPL-SCNC: 132 MMOL/L (ref 136–145)
TIBC SERPL-MCNC: 405 MCG/DL (ref 298–536)
TRANSFERRIN SERPL-MCNC: 272 MG/DL (ref 200–360)
WBC NRBC COR # BLD AUTO: 13.68 10*3/MM3 (ref 3.4–10.8)
WHOLE BLOOD HOLD COAG: NORMAL
WHOLE BLOOD HOLD SPECIMEN: NORMAL

## 2024-03-03 PROCEDURE — 99204 OFFICE O/P NEW MOD 45 MIN: CPT | Performed by: INTERNAL MEDICINE

## 2024-03-03 PROCEDURE — 25510000001 IOPAMIDOL 61 % SOLUTION: Performed by: STUDENT IN AN ORGANIZED HEALTH CARE EDUCATION/TRAINING PROGRAM

## 2024-03-03 PROCEDURE — 99223 1ST HOSP IP/OBS HIGH 75: CPT | Performed by: INTERNAL MEDICINE

## 2024-03-03 PROCEDURE — 25010000002 ENOXAPARIN PER 10 MG: Performed by: INTERNAL MEDICINE

## 2024-03-03 PROCEDURE — 85610 PROTHROMBIN TIME: CPT | Performed by: INTERNAL MEDICINE

## 2024-03-03 PROCEDURE — 94761 N-INVAS EAR/PLS OXIMETRY MLT: CPT

## 2024-03-03 PROCEDURE — 94799 UNLISTED PULMONARY SVC/PX: CPT

## 2024-03-03 PROCEDURE — 85027 COMPLETE CBC AUTOMATED: CPT | Performed by: INTERNAL MEDICINE

## 2024-03-03 PROCEDURE — 71275 CT ANGIOGRAPHY CHEST: CPT

## 2024-03-03 PROCEDURE — 93306 TTE W/DOPPLER COMPLETE: CPT | Performed by: INTERNAL MEDICINE

## 2024-03-03 PROCEDURE — 25010000002 SULFUR HEXAFLUORIDE MICROSPH 60.7-25 MG RECONSTITUTED SUSPENSION: Performed by: INTERNAL MEDICINE

## 2024-03-03 PROCEDURE — 25010000002 AZITHROMYCIN PER 500 MG: Performed by: INTERNAL MEDICINE

## 2024-03-03 PROCEDURE — 25810000003 SODIUM CHLORIDE 0.9 % SOLUTION: Performed by: INTERNAL MEDICINE

## 2024-03-03 PROCEDURE — 93306 TTE W/DOPPLER COMPLETE: CPT

## 2024-03-03 PROCEDURE — 0202U NFCT DS 22 TRGT SARS-COV-2: CPT | Performed by: INTERNAL MEDICINE

## 2024-03-03 PROCEDURE — 80053 COMPREHEN METABOLIC PANEL: CPT | Performed by: INTERNAL MEDICINE

## 2024-03-03 PROCEDURE — 25010000002 METHYLPREDNISOLONE PER 40 MG: Performed by: INTERNAL MEDICINE

## 2024-03-03 PROCEDURE — 25010000002 CEFTRIAXONE SODIUM-DEXTROSE 2-2.22 GM-%(50ML) RECONSTITUTED SOLUTION: Performed by: INTERNAL MEDICINE

## 2024-03-03 RX ORDER — BUDESONIDE 0.5 MG/2ML
1 INHALANT ORAL
Status: DISCONTINUED | OUTPATIENT
Start: 2024-03-03 | End: 2024-03-05 | Stop reason: HOSPADM

## 2024-03-03 RX ORDER — CHOLECALCIFEROL (VITAMIN D3) 125 MCG
5 CAPSULE ORAL NIGHTLY
Status: DISCONTINUED | OUTPATIENT
Start: 2024-03-03 | End: 2024-03-05 | Stop reason: HOSPADM

## 2024-03-03 RX ORDER — LISINOPRIL 20 MG/1
20 TABLET ORAL
Status: DISCONTINUED | OUTPATIENT
Start: 2024-03-03 | End: 2024-03-05 | Stop reason: HOSPADM

## 2024-03-03 RX ORDER — ONDANSETRON 4 MG/1
4 TABLET, ORALLY DISINTEGRATING ORAL EVERY 6 HOURS PRN
Status: DISCONTINUED | OUTPATIENT
Start: 2024-03-03 | End: 2024-03-05 | Stop reason: HOSPADM

## 2024-03-03 RX ORDER — METHYLPREDNISOLONE SODIUM SUCCINATE 40 MG/ML
40 INJECTION, POWDER, LYOPHILIZED, FOR SOLUTION INTRAMUSCULAR; INTRAVENOUS EVERY 6 HOURS
Status: DISCONTINUED | OUTPATIENT
Start: 2024-03-03 | End: 2024-03-04

## 2024-03-03 RX ORDER — CEFTRIAXONE 2 G/50ML
2000 INJECTION, SOLUTION INTRAVENOUS EVERY 24 HOURS
Status: DISCONTINUED | OUTPATIENT
Start: 2024-03-03 | End: 2024-03-04

## 2024-03-03 RX ORDER — AMLODIPINE BESYLATE 5 MG/1
5 TABLET ORAL DAILY
Status: DISCONTINUED | OUTPATIENT
Start: 2024-03-03 | End: 2024-03-05 | Stop reason: HOSPADM

## 2024-03-03 RX ORDER — WARFARIN SODIUM 5 MG/1
7.5 TABLET ORAL
Status: DISCONTINUED | OUTPATIENT
Start: 2024-03-03 | End: 2024-03-05 | Stop reason: HOSPADM

## 2024-03-03 RX ORDER — SODIUM CHLORIDE 0.9 % (FLUSH) 0.9 %
10 SYRINGE (ML) INJECTION EVERY 12 HOURS SCHEDULED
Status: DISCONTINUED | OUTPATIENT
Start: 2024-03-03 | End: 2024-03-05 | Stop reason: HOSPADM

## 2024-03-03 RX ORDER — NITROGLYCERIN 0.4 MG/1
0.4 TABLET SUBLINGUAL
Status: DISCONTINUED | OUTPATIENT
Start: 2024-03-03 | End: 2024-03-05 | Stop reason: HOSPADM

## 2024-03-03 RX ORDER — CALCIUM CARBONATE 500 MG/1
2 TABLET, CHEWABLE ORAL 2 TIMES DAILY PRN
Status: DISCONTINUED | OUTPATIENT
Start: 2024-03-03 | End: 2024-03-05 | Stop reason: HOSPADM

## 2024-03-03 RX ORDER — BISACODYL 5 MG/1
5 TABLET, DELAYED RELEASE ORAL DAILY PRN
Status: DISCONTINUED | OUTPATIENT
Start: 2024-03-03 | End: 2024-03-05 | Stop reason: HOSPADM

## 2024-03-03 RX ORDER — POLYETHYLENE GLYCOL 3350 17 G/17G
17 POWDER, FOR SOLUTION ORAL DAILY PRN
Status: DISCONTINUED | OUTPATIENT
Start: 2024-03-03 | End: 2024-03-05 | Stop reason: HOSPADM

## 2024-03-03 RX ORDER — ONDANSETRON 2 MG/ML
4 INJECTION INTRAMUSCULAR; INTRAVENOUS EVERY 6 HOURS PRN
Status: DISCONTINUED | OUTPATIENT
Start: 2024-03-03 | End: 2024-03-05 | Stop reason: HOSPADM

## 2024-03-03 RX ORDER — BISACODYL 10 MG
10 SUPPOSITORY, RECTAL RECTAL DAILY PRN
Status: DISCONTINUED | OUTPATIENT
Start: 2024-03-03 | End: 2024-03-05 | Stop reason: HOSPADM

## 2024-03-03 RX ORDER — CARVEDILOL 25 MG/1
25 TABLET ORAL 2 TIMES DAILY WITH MEALS
Status: DISCONTINUED | OUTPATIENT
Start: 2024-03-03 | End: 2024-03-05 | Stop reason: HOSPADM

## 2024-03-03 RX ORDER — FERROUS SULFATE 324(65)MG
324 TABLET, DELAYED RELEASE (ENTERIC COATED) ORAL EVERY OTHER DAY
Status: DISCONTINUED | OUTPATIENT
Start: 2024-03-03 | End: 2024-03-05 | Stop reason: HOSPADM

## 2024-03-03 RX ORDER — ENOXAPARIN SODIUM 100 MG/ML
0.7 INJECTION SUBCUTANEOUS EVERY 12 HOURS
Status: DISCONTINUED | OUTPATIENT
Start: 2024-03-03 | End: 2024-03-04

## 2024-03-03 RX ORDER — HYDROCHLOROTHIAZIDE 12.5 MG/1
12.5 TABLET ORAL
Status: DISCONTINUED | OUTPATIENT
Start: 2024-03-03 | End: 2024-03-05 | Stop reason: HOSPADM

## 2024-03-03 RX ORDER — IPRATROPIUM BROMIDE AND ALBUTEROL SULFATE 2.5; .5 MG/3ML; MG/3ML
3 SOLUTION RESPIRATORY (INHALATION)
Status: DISCONTINUED | OUTPATIENT
Start: 2024-03-03 | End: 2024-03-03

## 2024-03-03 RX ORDER — BUDESONIDE 0.5 MG/2ML
1 INHALANT ORAL
Status: DISCONTINUED | OUTPATIENT
Start: 2024-03-03 | End: 2024-03-03

## 2024-03-03 RX ORDER — AMOXICILLIN 250 MG
2 CAPSULE ORAL 2 TIMES DAILY PRN
Status: DISCONTINUED | OUTPATIENT
Start: 2024-03-03 | End: 2024-03-05 | Stop reason: HOSPADM

## 2024-03-03 RX ORDER — IPRATROPIUM BROMIDE AND ALBUTEROL SULFATE 2.5; .5 MG/3ML; MG/3ML
3 SOLUTION RESPIRATORY (INHALATION)
Status: DISCONTINUED | OUTPATIENT
Start: 2024-03-04 | End: 2024-03-05 | Stop reason: HOSPADM

## 2024-03-03 RX ORDER — ACETAMINOPHEN 325 MG/1
650 TABLET ORAL EVERY 4 HOURS PRN
Status: DISCONTINUED | OUTPATIENT
Start: 2024-03-03 | End: 2024-03-05 | Stop reason: HOSPADM

## 2024-03-03 RX ORDER — SODIUM CHLORIDE 0.9 % (FLUSH) 0.9 %
10 SYRINGE (ML) INJECTION AS NEEDED
Status: DISCONTINUED | OUTPATIENT
Start: 2024-03-03 | End: 2024-03-05 | Stop reason: HOSPADM

## 2024-03-03 RX ORDER — ENOXAPARIN SODIUM 100 MG/ML
60 INJECTION SUBCUTANEOUS EVERY 12 HOURS SCHEDULED
Status: DISCONTINUED | OUTPATIENT
Start: 2024-03-03 | End: 2024-03-03

## 2024-03-03 RX ORDER — ENOXAPARIN SODIUM 100 MG/ML
80 INJECTION SUBCUTANEOUS ONCE
Status: COMPLETED | OUTPATIENT
Start: 2024-03-03 | End: 2024-03-03

## 2024-03-03 RX ORDER — TRIAMCINOLONE ACETONIDE 1 MG/G
CREAM TOPICAL EVERY 12 HOURS SCHEDULED
Status: DISCONTINUED | OUTPATIENT
Start: 2024-03-03 | End: 2024-03-05 | Stop reason: HOSPADM

## 2024-03-03 RX ORDER — SODIUM CHLORIDE 9 MG/ML
40 INJECTION, SOLUTION INTRAVENOUS AS NEEDED
Status: DISCONTINUED | OUTPATIENT
Start: 2024-03-03 | End: 2024-03-05 | Stop reason: HOSPADM

## 2024-03-03 RX ORDER — ALBUTEROL SULFATE 2.5 MG/3ML
2.5 SOLUTION RESPIRATORY (INHALATION) ONCE
Status: COMPLETED | OUTPATIENT
Start: 2024-03-03 | End: 2024-03-03

## 2024-03-03 RX ADMIN — METHYLPREDNISOLONE SODIUM SUCCINATE 40 MG: 40 INJECTION, POWDER, FOR SOLUTION INTRAMUSCULAR; INTRAVENOUS at 20:55

## 2024-03-03 RX ADMIN — SODIUM CHLORIDE 500 MG: 900 INJECTION, SOLUTION INTRAVENOUS at 03:57

## 2024-03-03 RX ADMIN — TRIAMCINOLONE ACETONIDE: 1 CREAM TOPICAL at 03:56

## 2024-03-03 RX ADMIN — TRIAMCINOLONE ACETONIDE: 1 CREAM TOPICAL at 20:56

## 2024-03-03 RX ADMIN — ENOXAPARIN SODIUM 60 MG: 100 INJECTION SUBCUTANEOUS at 03:57

## 2024-03-03 RX ADMIN — HYDROCHLOROTHIAZIDE 12.5 MG: 12.5 CAPSULE ORAL at 09:28

## 2024-03-03 RX ADMIN — BUDESONIDE 1 MG: 0.5 INHALANT RESPIRATORY (INHALATION) at 07:04

## 2024-03-03 RX ADMIN — METHYLPREDNISOLONE SODIUM SUCCINATE 40 MG: 40 INJECTION, POWDER, FOR SOLUTION INTRAMUSCULAR; INTRAVENOUS at 14:58

## 2024-03-03 RX ADMIN — METHYLPREDNISOLONE SODIUM SUCCINATE 40 MG: 40 INJECTION, POWDER, FOR SOLUTION INTRAMUSCULAR; INTRAVENOUS at 08:24

## 2024-03-03 RX ADMIN — METHYLPREDNISOLONE SODIUM SUCCINATE 40 MG: 40 INJECTION, POWDER, FOR SOLUTION INTRAMUSCULAR; INTRAVENOUS at 03:57

## 2024-03-03 RX ADMIN — CARVEDILOL 25 MG: 25 TABLET, FILM COATED ORAL at 18:25

## 2024-03-03 RX ADMIN — IOPAMIDOL 100 ML: 612 INJECTION, SOLUTION INTRAVENOUS at 03:24

## 2024-03-03 RX ADMIN — Medication 10 ML: at 20:56

## 2024-03-03 RX ADMIN — CARVEDILOL 25 MG: 25 TABLET, FILM COATED ORAL at 07:25

## 2024-03-03 RX ADMIN — IPRATROPIUM BROMIDE AND ALBUTEROL SULFATE 3 ML: .5; 3 SOLUTION RESPIRATORY (INHALATION) at 07:04

## 2024-03-03 RX ADMIN — AMLODIPINE BESYLATE 5 MG: 5 TABLET ORAL at 08:23

## 2024-03-03 RX ADMIN — ENOXAPARIN SODIUM 80 MG: 80 INJECTION SUBCUTANEOUS at 05:33

## 2024-03-03 RX ADMIN — Medication 10 ML: at 02:40

## 2024-03-03 RX ADMIN — WARFARIN SODIUM 7.5 MG: 5 TABLET ORAL at 18:39

## 2024-03-03 RX ADMIN — ALBUTEROL SULFATE 2.5 MG: 2.5 SOLUTION RESPIRATORY (INHALATION) at 01:44

## 2024-03-03 RX ADMIN — IPRATROPIUM BROMIDE AND ALBUTEROL SULFATE 3 ML: .5; 3 SOLUTION RESPIRATORY (INHALATION) at 12:05

## 2024-03-03 RX ADMIN — ENOXAPARIN SODIUM 140 MG: 100 INJECTION SUBCUTANEOUS at 16:05

## 2024-03-03 RX ADMIN — LISINOPRIL 20 MG: 20 TABLET ORAL at 08:23

## 2024-03-03 RX ADMIN — TRIAMCINOLONE ACETONIDE: 1 CREAM TOPICAL at 08:24

## 2024-03-03 RX ADMIN — Medication 10 ML: at 08:22

## 2024-03-03 RX ADMIN — CEFTRIAXONE 2000 MG: 2 INJECTION, SOLUTION INTRAVENOUS at 05:33

## 2024-03-03 RX ADMIN — SULFUR HEXAFLUORIDE 6 ML: KIT at 09:18

## 2024-03-03 RX ADMIN — FERROUS SULFATE TAB EC 324 MG (65 MG FE EQUIVALENT) 324 MG: 324 (65 FE) TABLET DELAYED RESPONSE at 08:23

## 2024-03-03 RX ADMIN — BUDESONIDE 1 MG: 0.5 INHALANT RESPIRATORY (INHALATION) at 20:02

## 2024-03-03 RX ADMIN — IPRATROPIUM BROMIDE AND ALBUTEROL SULFATE 3 ML: .5; 3 SOLUTION RESPIRATORY (INHALATION) at 20:02

## 2024-03-03 RX ADMIN — MELATONIN TAB 5 MG 5 MG: 5 TAB at 03:56

## 2024-03-03 NOTE — PAYOR COMM NOTE
"    Inpatient notification and clinicals  UR manager; Emmy Rascon -[phone] and fax [phone]      Nicolas Mijares \"Ryne\" (28 y.o. Male)       Date of Birth   1995    Social Security Number       Address   [address] [place], [place] [address]    Home Phone   [phone]    MRN   [id number]       [place]   None    Marital Status   Single                            Admission Date   3/2/24    Admission Type   Emergency    Admitting Provider   Fernando Schmid DO    Attending Provider   Shaq Guallpa MD    Department, Room/Bed   [facility] EMERGENCY DEPARTMENT,        Discharge Date       Discharge Disposition       Discharge Destination                                 Attending Provider: Shaq Guallpa MD    Allergies: Cherry Flavor, Lorabid [Loracarbef]    Isolation: None   Infection: COVID (rule out) (24)   Code Status: CPR    Ht: 188 cm (74.02\")   Wt: 202 kg (445 lb 5.3 oz)    Admission Cmt: None   Principal Problem: Hypoxemia [R09.02]                   Active Insurance as of 3/2/2024       Primary Coverage       Payor Plan Insurance Group Employer/Plan Group    [employer] [facility] [place] [employer] [facility]        Payor Plan Address Payor Plan Phone Number Payor Plan Fax Number Effective Dates    [address]   2023 - None Entered    [place] [address]         Subscriber Name Subscriber Birth Date Member ID       NICOLAS MIJARES ALEX 1995 [id number]                     Emergency Contacts        (Rel.) Home Phone Work Phone Mobile Phone    Elin Mijares (Mother) [phone] -- [phone]                 History & Physical        Fernando Schmid DO at 24 0240            [facility] HOSPITALIST   HISTORY AND PHYSICAL      Name:  Nicolas Mijares   Age:  28 y.o.  Sex:  male  :  1995  MRN:  [id number]   Visit Number:  [id number]  Admission Date:  3/2/2024  Date Of Service:  24  Primary " Care Physician:  Chantal Gutierrez APRN    Chief Complaint:     Dyspnea    History Of Presenting Illness:      28-year-old gentleman who provides his own history and also his mom supplements by phone.  Past history of asthma and hypertension as well as morbid obesity and cor pulmonale.  Reports that he was working at [employer] back on February 28 at which time he was having shortness of breath at work and so they sent him home.  He went to his primary care and they diagnosed him with RSV.  He was started on doxycycline.  He had had persistent dyspnea and hypoxia and a cough productive of mucus since that time.  However his hypoxia had worsened at home which has been monitored by his mother and so she sent him to the emergency department.  In the emergency department they gave him steroids and nebulizer treatments and attempted ambulation but he remained hypoxic.  He elects to be full code.    Pertinent findings: Highly sensitive troponin 21, proBNP 181.4, sodium 134, D-dimer 3.98, WBC 13.48, hemoglobin 11.6, MCV 71.4, chest x-ray showing cardiomegaly without failure, EKG showing sinus tachycardia without acute ST or T wave changes    ED Medications:    Medications   sodium chloride 0.9 % flush 10 mL (has no administration in time range)   dexAMETHasone sodium phosphate injection 10 mg (10 mg Intravenous Given 3/2/24 2339)   ipratropium-albuterol (DUO-NEB) nebulizer solution 3 mL (3 mL Nebulization Given 3/2/24 2327)   albuterol (PROVENTIL) nebulizer solution 0.083% 2.5 mg/3mL (2.5 mg Nebulization Given 3/3/24 0144)       Edited by: Fernando Schmid DO at 3/3/2024 0231     Review Of Systems:    All systems were reviewed and negative except as mentioned in history of presenting illness, assessment and plan.    Past Medical History: Patient  has a past medical history of Asthma, intrinsic, Bronchitis, and Hypertension.    Past Surgical History: Patient  has no past surgical history on file.    Social History: Patient   "reports that he quit smoking about 9 years ago. His smoking use included cigarettes. He has never used smokeless tobacco. He reports that he does not drink alcohol and does not use drugs.    Family History:  Patient's family history has been reviewed and found to be noncontributory.     Allergies:      Cherry flavor and Lorabid [loracarbef]    Home Medications:    Prior to Admission Medications       Prescriptions Last Dose Informant Patient Reported? Taking?    amLODIPine (NORVASC) 5 MG tablet   No No    Take 1 tablet by mouth Daily.    carvedilol (COREG) 25 MG tablet   No No    Take 1 tablet by mouth 2 (Two) Times a Day With Meals.    lisinopril-hydrochlorothiazide (PRINZIDE,ZESTORETIC) 20-12.5 MG per tablet   No No    Take 1 tablet by mouth Daily. 200001    triamcinolone (KENALOG) 0.025 % cream   Yes No    APPLY TOPICALLY TO THE LEFT THIGH TWICE DAILY              Vital Signs:  Temp:  [97.7 °F (36.5 °C)] 97.7 °F (36.5 °C)  Heart Rate:  [] 92  Resp:  [18] 18  BP: (113-162)/(48-87) 113/66        03/02/24  2243   Weight: (!) 202 kg (445 lb)     Body mass index is 57.13 kg/m².    Physical Exam:     Most recent vital Signs: /66   Pulse 92   Temp 97.7 °F (36.5 °C) (Oral)   Resp 18   Ht 188 cm (74\")   Wt (!) 202 kg (445 lb)   SpO2 91%   BMI 57.13 kg/m²     Constitutional: Awake, alert, morbidly obese  Eyes: PERRLA, sclerae anicteric, no conjunctival injection  HENT: NCAT, mucous membranes moist  Neck: Supple, no thyromegaly, no lymphadenopathy, trachea midline  Respiratory: Breath sounds are distant, upon deep insertion of the stethoscope auscultation is clear without wheeze rales or rhonchi bilaterally, nonlabored respirations at rest but moderately labored just sitting up in bed  Cardiovascular: Tachycardic but regular, no murmurs, rubs, or gallops, palpable pedal pulses bilaterally  Gastrointestinal: Positive bowel sounds, soft, nontender, nondistended  Musculoskeletal: No bilateral ankle edema, no " "clubbing or cyanosis to extremities  Psychiatric: Appropriate affect, cooperative  Neurologic: Oriented x 3, speech clear  Skin: No rashes  Edited by: Fernando Schmid DO at 3/3/2024 0231      Laboratory data:    I have reviewed the labs done in the emergency room.    Results from last 7 days   Lab Units 03/02/24  2251   SODIUM mmol/L 134*   POTASSIUM mmol/L 4.4   CHLORIDE mmol/L 99   CO2 mmol/L 23.7   BUN mg/dL 12   CREATININE mg/dL 0.91   CALCIUM mg/dL 9.2   BILIRUBIN mg/dL 0.5   ALK PHOS U/L 96   ALT (SGPT) U/L 20   AST (SGOT) U/L 16   GLUCOSE mg/dL 99     Results from last 7 days   Lab Units 03/02/24  2251   WBC 10*3/mm3 13.48*   HEMOGLOBIN g/dL 11.6*   HEMATOCRIT % 37.0*   PLATELETS 10*3/mm3 249         Results from last 7 days   Lab Units 03/02/24  2251   HSTROP T ng/L 21     Results from last 7 days   Lab Units 03/02/24  2251   PROBNP pg/mL 181.4                       Invalid input(s): \"USDES\", \"NITRITITE\", \"BACT\", \"EP\"    Pain Management Panel           No data to display                EKG:      Sinus tachycardia no acute ST or T wave changes noted    Radiology:    XR Chest 1 View    Result Date: 3/3/2024  FINAL REPORT TECHNIQUE: null CLINICAL HISTORY: SOA, weakness, RSV 1 week ago COMPARISON: null FINDINGS: 1 view chest Comparison: None Findings: No consolidation or effusion. No acute fracture.     Impression: 1. Cardiomegaly without failure. Authenticated and Electronically Signed by Kia Overton MD on 03/03/2024 01:58:21 AM     Assessment/Plan:      Hypoxemia    Morbid obesity    Cor pulmonale (chronic)    Acute bronchitis    Positive D dimer    Microcytic anemia    Asthma        -Observation telemetry.  The differential for his hypoxemia is likely multifactorial.  It may be just acute bronchitis coupled due to RSV in the setting of asthma. Will check viral respiratory panel.  Procalcitonin very low speaks against superimposed bacterial infection.  Provide azithromycin for its anti-inflammatory " properties in URI, IV steroids, nebulizer treatments including steroids and bronchodilators. Patient does have elevated D-dimer however his risk of anticoagulation is significant with anemia.  Will check a CTA.  Will check iron panel.  If he is found to have iron deficiency he may need a colonoscopy outpatient.  Also patient may have obesity hypoventilation syndrome playing a role.  Given history of cor pulmonale and worsening hypoxia will check a 2D echo.  Would benefit from pulmonology referral outpatient or consult inpatient if not improving.  Edited by: Fernando Schmid DO at 3/3/2024 0240     Update: CTA showed bilateral pulmonary emboli and multifocal pneumonia.  Concern for right heart strain.  Blood pressure is stable.  Will discuss with cardiology in the morning possibility of catheter directed therapy.  Also added iron for him as his iron come back low and added ceftriaxone.      Risk Assessment: Moderate  DVT Prophylaxis: Lovenox  Code Status:   Code Status and Medical Interventions:   Ordered at: 03/03/24 0238     Code Status (Patient has no pulse and is not breathing):    CPR (Attempt to Resuscitate)     Medical Interventions (Patient has pulse or is breathing):    Full Support      Diet:   Dietary Orders (From admission, onward)       Start     Ordered    03/03/24 0240  Diet: Regular/House; Fluid Consistency: Thin (IDDSI 0)  Diet Effective Now        References:    Diet Order Crosswalk   Question Answer Comment   Diets: Regular/House    Fluid Consistency: Thin (IDDSI 0)        03/03/24 0239                     Advance Care Planning  ACP discussion was held with the patient during this visit. Patient does not have an advance directive, declines further assistance.           Fernando Schmid DO  03/03/24  02:40 EST    Dictated utilizing Dragon dictation.      Electronically signed by Fernando Schmid DO at 03/03/24 0442          Emergency Department Notes        Dominic Cedeno MD at 03/03/24  "0200          Subjective:  History of Present Illness:    Patient is a 28-year-old male with recent history of RSV.  Patient reports on 2023 he was diagnosed with RSV.  He reports since diagnosis have progressively gotten more short of breath.  He denies chest pain.  Denies OTC medication home remedy.  Denies alleviating exacerbating factors.    Nurses Notes reviewed and agree, including vitals, allergies, social history and prior medical history.     REVIEW OF SYSTEMS: All systems reviewed and not pertinent unless noted.  Review of Systems   Respiratory:  Positive for shortness of breath.    All other systems reviewed and are negative.      Past Medical History:   Diagnosis Date    Asthma, intrinsic     Bronchitis     Hypertension        Allergies:    Cherry flavor and Lorabid [loracarbef]      History reviewed. No pertinent surgical history.      Social History     Socioeconomic History    Marital status: Single   Tobacco Use    Smoking status: Former     Current packs/day: 0.00     Types: Cigarettes     Quit date:      Years since quittin.1    Smokeless tobacco: Never    Tobacco comments:     patient smoke 3 months   Vaping Use    Vaping status: Never Used   Substance and Sexual Activity    Alcohol use: No    Drug use: No    Sexual activity: Defer         Family History   Problem Relation Age of Onset    Diabetes Father     Hypertension Other        Objective  Physical Exam:  /87 (BP Location: Left arm, Patient Position: Sitting)   Pulse 116   Temp 97.7 °F (36.5 °C) (Oral)   Resp 18   Ht 188 cm (74\")   Wt (!) 202 kg (445 lb)   SpO2 96%   BMI 57.13 kg/m²      Physical Exam  Vitals and nursing note reviewed.   Constitutional:       Appearance: He is well-developed and normal weight.   HENT:      Head: Normocephalic and atraumatic.      Mouth/Throat:      Mouth: Mucous membranes are moist.      Pharynx: Oropharynx is clear.   Eyes:      Extraocular Movements: Extraocular movements intact. "      Pupils: Pupils are equal, round, and reactive to light.   Cardiovascular:      Rate and Rhythm: Normal rate and regular rhythm.   Pulmonary:      Effort: Pulmonary effort is normal.      Breath sounds: Normal breath sounds.   Abdominal:      General: Bowel sounds are normal.      Palpations: Abdomen is soft.   Musculoskeletal:         General: Normal range of motion.      Cervical back: Normal range of motion and neck supple.   Skin:     General: Skin is warm.      Capillary Refill: Capillary refill takes less than 2 seconds.   Neurological:      General: No focal deficit present.      Mental Status: He is alert and oriented to person, place, and time.   Psychiatric:         Mood and Affect: Mood normal.         Behavior: Behavior normal.         Procedures    ED Course:         Lab Results (last 24 hours)       Procedure Component Value Units Date/Time    CBC & Differential [[id number]]  (Abnormal) Collected: 03/02/24 2251    Specimen: Blood Updated: 03/02/24 2310    Narrative:      The following orders were created for panel order CBC & Differential.  Procedure                               Abnormality         Status                     ---------                               -----------         ------                     CBC Auto Differential[[id number]]        Abnormal            Final result               Scan Slide[[id number]]                                       Final result                 Please view results for these tests on the individual orders.    Comprehensive Metabolic Panel [[id number]]  (Abnormal) Collected: 03/02/24 2251    Specimen: Blood Updated: 03/02/24 2316     Glucose 99 mg/dL      BUN 12 mg/dL      Creatinine 0.91 mg/dL      Sodium 134 mmol/L      Potassium 4.4 mmol/L      Chloride 99 mmol/L      CO2 23.7 mmol/L      Calcium 9.2 mg/dL      Total Protein 7.9 g/dL      Albumin 4.0 g/dL      ALT (SGPT) 20 U/L      AST (SGOT) 16 U/L      Alkaline Phosphatase 96 U/L      Total Bilirubin 0.5  mg/dL      Globulin 3.9 gm/dL      A/G Ratio 1.0 g/dL      BUN/Creatinine Ratio 13.2     Anion Gap 11.3 mmol/L      eGFR 117.7 mL/min/1.73     Narrative:      GFR Normal >60  Chronic Kidney Disease <60  Kidney Failure <15      BNP [[id number]]  (Normal) Collected: 03/02/24 2251    Specimen: Blood Updated: 03/02/24 2318     proBNP 181.4 pg/mL     Narrative:      This assay is used as an aid in the diagnosis of individuals suspected of having heart failure. It can be used as an aid in the diagnosis of acute decompensated heart failure (ADHF) in patients presenting with signs and symptoms of ADHF to the emergency department (ED). In addition, NT-proBNP of <300 pg/mL indicates ADHF is not likely.    Age Range Result Interpretation  NT-proBNP Concentration (pg/mL:      <50             Positive            >450                   Gray                 300-450                    Negative             <300    50-75           Positive            >900                  Gray                300-900                  Negative            <300      >75             Positive            >1800                  Gray                300-1800                  Negative            <300    Single High Sensitivity Troponin T [[id number]]  (Normal) Collected: 03/02/24 2251    Specimen: Blood Updated: 03/02/24 2318     HS Troponin T 21 ng/L     Narrative:      High Sensitive Troponin T Reference Range:  <14.0 ng/L- Negative Female for AMI  <22.0 ng/L- Negative Male for AMI  >=14 - Abnormal Female indicating possible myocardial injury.  >=22 - Abnormal Male indicating possible myocardial injury.   Clinicians would have to utilize clinical acumen, EKG, Troponin, and serial changes to determine if it is an Acute Myocardial Infarction or myocardial injury due to an underlying chronic condition.         CBC Auto Differential [[id number]]  (Abnormal) Collected: 03/02/24 2251    Specimen: Blood Updated: 03/02/24 2258     WBC 13.48 10*3/mm3      RBC 5.18  10*6/mm3      Hemoglobin 11.6 g/dL      Hematocrit 37.0 %      MCV 71.4 fL      MCH 22.4 pg      MCHC 31.4 g/dL      RDW 18.2 %      RDW-SD 44.6 fl      MPV 9.3 fL      Platelets 249 10*3/mm3      Neutrophil % 77.5 %      Lymphocyte % 13.6 %      Monocyte % 6.2 %      Eosinophil % 1.3 %      Basophil % 0.4 %      Immature Grans % 1.0 %      Neutrophils, Absolute 10.44 10*3/mm3      Lymphocytes, Absolute 1.84 10*3/mm3      Monocytes, Absolute 0.84 10*3/mm3      Eosinophils, Absolute 0.18 10*3/mm3      Basophils, Absolute 0.05 10*3/mm3      Immature Grans, Absolute 0.13 10*3/mm3      nRBC 0.0 /100 WBC     Scan Slide [[id number]] Collected: 03/02/24 2251    Specimen: Blood Updated: 03/02/24 2310     Anisocytosis Slight/1+     Microcytes Mod/2+     WBC Morphology Normal     Platelet Estimate Adequate             XR Chest 1 View    Result Date: 3/3/2024  FINAL REPORT TECHNIQUE: null CLINICAL HISTORY: SOA, weakness, RSV 1 week ago COMPARISON: null FINDINGS: 1 view chest Comparison: None Findings: No consolidation or effusion. No acute fracture.     Impression: Impression: 1. Cardiomegaly without failure. Authenticated and Electronically Signed by Kia Overton MD on 03/03/2024 01:58:21 AM        MDM     Amount and/or Complexity of Data Reviewed  Clinical lab tests: reviewed  Tests in the radiology section of CPT®: reviewed  Tests in the medicine section of CPT®: reviewed        Initial impression of presenting illness: Patient is a 28-year-old male with recent history of RSV.  Patient reports on 2/27/2023 he was diagnosed with RSV.  He reports since diagnosis have progressively gotten more short of breath.  He denies chest pain.  Denies OTC medication home remedy.  Denies alleviating exacerbating factors.    DDX: includes but is not limited to: COVID, flu, rhinovirus or other    Patient arrives stable with vitals interpreted by myself.     Pertinent features from physical exam: Lung sounds are diminished bilaterally.   Abdomen soft nontender.  Bowel sounds are normal.  Heart sounds normal..    Initial diagnostic plan: CBC, CMP, troponin, BNP, procalcitonin, D-dimer, chest x-ray    Results from initial plan were reviewed and interpreted by me revealing CBC with mild anemia.  CMP is within appropriate range.  Troponin is negative.  BNP is negative.  Procalcitonin and D-dimer are pending.  Chest x-ray with following impression cardiomegaly without failure    Diagnostic information from other sources: Chart review    Interventions / Re-evaluation: Signs stable throughout encounter.  Patient received 10 mg dexamethasone.  DuoNeb and albuterol nebulizer.    Results/clinical rationale were discussed with patient    Consultations/Discussion of results with other physicians: Dr. Schmid.  He is agreeable to admit this patient to the hospital for hypoxia/RSV    Disposition plan: Admit patient to the hospital  -----   Pao Addendum:   Following admission to hospitalist CT pulmonary embolism study was obtained and radiologist contacted me to alert me of critical finding.  CT showed evidence of bilateral pulmonary emboli with right heart strain.  Immediately contacted hospitalist and informed him of this critical result to which he voiced understanding.    Final diagnoses:   RSV infection   Hypoxia          Adi Abraham, APRN  03/03/24 0204       Dominic Cedeno MD  03/03/24 0449      Electronically signed by Dominic Cedeno MD at 03/03/24 0449       Melania Curiel, RN at 03/02/24 2254          Pt placed on 3L via NC att. Pt satting at 91%    Electronically signed by Melania Curiel, RN at 03/02/24 9237       Current Facility-Administered Medications   Medication Dose Route Frequency Provider Last Rate Last Admin    acetaminophen (TYLENOL) tablet 650 mg  650 mg Oral Q4H PRN Fernando Schmid DO        amLODIPine (NORVASC) tablet 5 mg  5 mg Oral Daily Fernando Schmid DO   5 mg at 03/03/24 0823    azithromycin (ZITHROMAX) 500 mg  0.9% NaCl (Add-vantage) 250 mL  500 mg Intravenous Q24H Fernando Schmid DO   Stopped at 03/03/24 0532    sennosides-docusate (PERICOLACE) 8.6-50 MG per tablet 2 tablet  2 tablet Oral BID PRN Fernando Schmid DO        And    polyethylene glycol (MIRALAX) packet 17 g  17 g Oral Daily PRN Fernando Schmid DO        And    bisacodyl (DULCOLAX) EC tablet 5 mg  5 mg Oral Daily PRN Fernando Schmid DO        And    bisacodyl (DULCOLAX) suppository 10 mg  10 mg Rectal Daily PRN Fernando Schmid DO        budesonide (PULMICORT) nebulizer solution 1 mg  1 mg Nebulization BID - RT Dominic Cedeno MD   1 mg at 03/03/24 0704    calcium carbonate (TUMS) chewable tablet 500 mg (200 mg elemental)  2 tablet Oral BID PRN Fernando Schmid DO        Calcium Replacement - Follow Nurse / BPA Driven Protocol   Does not apply PRN Fernando Schmid DO        carvedilol (COREG) tablet 25 mg  25 mg Oral BID With Meals Fernando Schmid DO   25 mg at 03/03/24 0725    cefTRIAXone (ROCEPHIN) IVPB 2000 mg/50ml dextrose (premix)  2,000 mg Intravenous Q24H Fernando Schmid DO   Stopped at 03/03/24 0634    Enoxaparin Sodium (LOVENOX) syringe 140 mg  0.7 mg/kg Subcutaneous Q12H Florentino Gee MD        ferrous sulfate EC tablet 324 mg  324 mg Oral Every Other Day Fernando Schmid DO   324 mg at 03/03/24 0823    lisinopril (PRINIVIL,ZESTRIL) tablet 20 mg  20 mg Oral Q24H Fernando Schmid DO   20 mg at 03/03/24 0823    And    hydroCHLOROthiazide oral 12.5 mg  12.5 mg Oral Q24H Fernando Schmid DO   12.5 mg at 03/03/24 0928    ipratropium-albuterol (DUO-NEB) nebulizer solution 3 mL  3 mL Nebulization 4x Daily - RT Fernando Schmid DO   3 mL at 03/03/24 1205    Magnesium Standard Dose Replacement - Follow Nurse / BPA Driven Protocol   Does not apply PRN Fernando Schmid DO        melatonin tablet 5 mg  5 mg Oral Nightly Fernando Schmid DO   5 mg at 03/03/24 0356    methylPREDNISolone sodium  succinate (SOLU-Medrol) injection 40 mg  40 mg Intravenous Q6H Fernando Schmid, DO   40 mg at 03/03/24 0824    nitroglycerin (NITROSTAT) SL tablet 0.4 mg  0.4 mg Sublingual Q5 Min PRN Fernando Schmid,         ondansetron ODT (ZOFRAN-ODT) disintegrating tablet 4 mg  4 mg Oral Q6H PRN Fernando Schmid DO        Or    ondansetron (ZOFRAN) injection 4 mg  4 mg Intravenous Q6H PRN Fernando Schmid,         Pharmacy to dose warfarin   Does not apply Continuous PRN Florentino Gee MD        Phosphorus Replacement - Follow Nurse / BPA Driven Protocol   Does not apply PRN Fernando Schmid DO        Potassium Replacement - Follow Nurse / BPA Driven Protocol   Does not apply PRN Fernando Schmid,         sodium chloride 0.9 % flush 10 mL  10 mL Intravenous PRN Fernando Schmid,         sodium chloride 0.9 % flush 10 mL  10 mL Intravenous Q12H Fernando Schmid, DO   10 mL at 03/03/24 0822    sodium chloride 0.9 % flush 10 mL  10 mL Intravenous PRN Fernando Schmid,         sodium chloride 0.9 % infusion 40 mL  40 mL Intravenous PRN Fernando Schmid DO        triamcinolone (KENALOG) 0.1 % cream   Topical Q12H Fernando Schmid DO   Given at 03/03/24 0824    warfarin (COUMADIN) tablet 7.5 mg  7.5 mg Oral Daily Florentino Gee MD         Current Outpatient Medications   Medication Sig Dispense Refill    amLODIPine (NORVASC) 5 MG tablet Take 1 tablet by mouth Daily. 30 tablet 2    carvedilol (COREG) 25 MG tablet Take 1 tablet by mouth 2 (Two) Times a Day With Meals. 60 tablet 2    lisinopril-hydrochlorothiazide (PRINZIDE,ZESTORETIC) 20-12.5 MG per tablet Take 1 tablet by mouth Daily. 200001 30 tablet 2    triamcinolone (KENALOG) 0.025 % cream APPLY TOPICALLY TO THE LEFT THIGH TWICE DAILY          Physician Progress Notes (last 24 hours)        Shaq Guallpa MD at 03/03/24 1258                [facility]  INTERNAL MEDICINE PROGRESS NOTE    Name:  Jesús Mijares   Age:   28 y.o.  Sex:  male  :  1995  MRN:  [id number]   Visit Number:  [id number]  Admission Date:  3/2/2024  Date Of Service:  24  Primary Care Physician:  Chantal Gutierrez APRN     LOS: 0 days :  Patient Care Team:  Chantal Gutierrez APRN as PCP - General:      Subjective / Interval History:     28-year-old patient who has been admitted with a past medical history of asthma, hypertension, and was found to be hypoxic with a diagnosis of RSV by the primary care few days ago.  Today the workup done for hypoxemia showed that he has bilateral pulmonary emboli and cardiology consult has been done with decision to start oral anticoagulation  At present he is saturation is 90% on oxygen and with his breathing seems to be better at rest bilateral crepitations on the bases      Vital Signs:    Temp:  [97.7 °F (36.5 °C)] 97.7 °F (36.5 °C)  Heart Rate:  [] 112  Resp:  [18-28] 28  BP: (113-162)/(48-87) 113/66    Intake and output:    No intake/output data recorded.  No intake/output data recorded.    Physical Examination:    General Appearance:    Alert and cooperative, he is morbidly obese   Head:    Atraumatic and normocephalic, without obvious abnormality.   Eyes:            PERRLA,  No pallor. Extraocular movements are within normal limits.   Neck:   Supple,  No lymph glands, no bruit   Lungs:     Chest shape is normal.  Bilateral crepitations on the bases    Heart:    Normal S1 and S2, no murmur,  No JVD   Abdomen:     Normal bowel sounds, no masses, no organomegaly. Soft     nontender, no guarding, no rebound tenderness   Extremities:   Moves all extremities well, bilateral pitting edema, no cyanosis,    Skin:   No  bruising or rash.   Neurologic:   Grossly nonfocal and moves all extremities.      Laboratory results:  Results from last 7 days   Lab Units 24  0701 24  2251   SODIUM mmol/L 132* 134*   POTASSIUM mmol/L 4.6 4.4   CHLORIDE mmol/L 98 99   CO2 mmol/L 20.2* 23.7   BUN mg/dL 16 12    CREATININE mg/dL 0.97 0.91   CALCIUM mg/dL 9.2 9.2   BILIRUBIN mg/dL 0.5 0.5   ALK PHOS U/L 101 96   ALT (SGPT) U/L 19 20   AST (SGOT) U/L 15 16   GLUCOSE mg/dL 171* 99     Results from last 7 days   Lab Units 03/03/24  0701 03/02/24  2251   WBC 10*3/mm3 13.68* 13.48*   HEMOGLOBIN g/dL 11.3* 11.6*   HEMATOCRIT % 36.2* 37.0*   PLATELETS 10*3/mm3 246 249         Results from last 7 days   Lab Units 03/02/24  2251   HSTROP T ng/L 21           Radiology results:    Imaging Results (Last 24 Hours)       Procedure Component Value Units Date/Time    CT Angiogram Chest Pulmonary Embolism [[id number]] Collected: 03/03/24 0405     Updated: 03/03/24 0414    Addenda:        ADDENDUM REPORT    ADDENDUM:  This report was discussed with Dr. Sousa on Mar 03, 2024 04:12:00 EST.    Authenticated and Electronically Signed by Kia Overton MD  on 03/03/2024 04:13:02 AM  Signed: 03/03/24 0413 by Kia Overton MD    Narrative:      FINAL REPORT    TECHNIQUE:  null    CLINICAL HISTORY:  Pulmonary embolism (PE) suspected; SOA    COMPARISON:  null    FINDINGS:  CT angiography chest with contrast. 3D Postprocessing.    Comparison: None    Findings:    Heart size is approaching the upper limits in size. RV/LV ratio of 1.2.    Unremarkable thoracic aorta and great vessels. No aneurysm or dissection.    The visualized thyroid and mediastinum are unremarkable.    Symmetric gynecomastia.    There are multiple acute segmental and subsegmental pulmonary emboli throughout both lower lobes, middle lobe, and right upper lobe. No definite emboli within the left upper lobe.    Patchy consolidation left lower lobe mostly medially. A few small patchy opacities also seen in the right upper lobe near the hilum.    Probable fatty liver. The liver is also likely enlarged although incompletely included on the exam.    Splenomegaly.    The bones are intact.      Impression:      IMPRESSION:    1. Large burden of bilateral acute pulmonary  embolus. Right heart strain.    2. Right upper lobe and left lower lobe patchy lung opacities. Consider multifocal pneumonia.    3. Additional findings as above.    Authenticated and Electronically Signed by Kia Overton MD  on 03/03/2024 04:05:32 AM    XR Chest 1 View [[id number]] Collected: 03/03/24 0158     Updated: 03/03/24 0200    Narrative:      FINAL REPORT    TECHNIQUE:  null    CLINICAL HISTORY:  SOA, weakness, RSV 1 week ago    COMPARISON:  null    FINDINGS:  1 view chest    Comparison: None    Findings:    No consolidation or effusion.    No acute fracture.      Impression:      Impression:    1. Cardiomegaly without failure.    Authenticated and Electronically Signed by Kia Overton MD  on 03/03/2024 01:58:21 AM            I have reviewed the patient's radiology reports.    Medication Review:     I have reviewed the patients active and prn medications.     Assessment:      Hypoxemia    Morbid obesity    Cor pulmonale (chronic)    Acute bronchitis    Positive D dimer    Microcytic anemia    Asthma  Bilateral PE        Plan:    1.  Bilateral PE-after discussion with cardiologist it was decided medical management compared to EKOS or mechanical thrombectomy  Start with full dose of Lovenox and will transition to Coumadin  Continue with oxygen and titrate to keep with saturation 90% and above    2.  Asthma-she does have RSV and the CT scan shows multifocal pneumonia but procalcitonin is very low and will cover him with antibiotic for 3 days as there is leukocytosis also for presumed secondary bacterial infection    Goals of treatment and plan of care has been addressed with the patient and the father who is at bedside.  He will be admitted as inpatient as will need more than 2 night stay    Shaq Guallpa MD  03/03/24  12:58 EST      Please note that portions of this note were completed with a voice recognition program.      Electronically signed by Shaq Guallpa MD at 03/03/24 9193

## 2024-03-03 NOTE — CONSULTS
Referring Provider: Shaq Guallpa MD    Reason for Consultation:   Shortness of breath      Patient Care Team:  Chantal Gutierrez APRN as PCP - General      SUBJECTIVE     Chief Complaint: Shortness of breath    History of present illness:  Jesús Mijares is a 28 y.o. male with morbid obesity and BMI of more than 57 who presents to the hospital with complaints of shortness of breath which started at work for the past 1 week.  He was recently diagnosed with RSV and was given doxycycline.  He has had persistent dyspnea, hypoxia and cough productive of mucus for the last 5 days.  He was given steroids and nebulizer in the emergency room however he became hypoxic with ambulation.  CTA showed acute segmental and subsegmental pulmonary emboli throughout bilateral lower lobes, middle lobe and right upper lobe.  RV/LV ratio 1.2:1.  Cardiology has been consulted for consideration EKOS or mechanical thrombectomy.        Review of systems:    Constitutional: No weakness, fatigue, fever, rigors, chills   Eyes: No vision changes, eye pain   ENT/oropharynx: No difficulty swallowing, sore throat, epistaxis, changes in hearing   Cardiovascular: No chest pain, chest tightness, palpitations, paroxysmal nocturnal dyspnea, orthopnea, diaphoresis, dizziness / syncopal episode   Respiratory: No shortness of breath, dyspnea on exertion, cough, wheezing, hemoptysis   Gastrointestinal: No abdominal pain, nausea, vomiting, diarrhea, bloody stools   Genitourinary: No hematuria, dysuria   Neurological: No headache, tremors, numbness, one-sided weakness    Musculoskeletal: No cramps, myalgias, joint pain, joint swelling   Integument: No rash, edema        Personal History:      Past Medical History:   Diagnosis Date    Asthma, intrinsic     Bronchitis     Hypertension        History reviewed. No pertinent surgical history.    Family History   Problem Relation Age of Onset    Diabetes Father     Hypertension Other        Social  History     Tobacco Use    Smoking status: Former     Current packs/day: 0.00     Types: Cigarettes     Quit date:      Years since quittin.1    Smokeless tobacco: Never    Tobacco comments:     patient smoke 3 months   Vaping Use    Vaping status: Never Used   Substance Use Topics    Alcohol use: No    Drug use: No        Home meds:  Prior to Admission medications    Medication Sig Start Date End Date Taking? Authorizing Provider   amLODIPine (NORVASC) 5 MG tablet Take 1 tablet by mouth Daily. 24   Yenni Espinosa APRN   carvedilol (COREG) 25 MG tablet Take 1 tablet by mouth 2 (Two) Times a Day With Meals. 24   Yenni Espinosa APRN   lisinopril-hydrochlorothiazide (PRINZIDE,ZESTORETIC) 20-12.5 MG per tablet Take 1 tablet by mouth Daily. 24   Yenni Espinosa APRN   triamcinolone (KENALOG) 0.025 % cream APPLY TOPICALLY TO THE LEFT THIGH TWICE DAILY 21   Provider, MD Bren       Allergies:     Cherry flavor and Lorabid [loracarbef]    Scheduled Meds:amLODIPine, 5 mg, Oral, Daily  azithromycin, 500 mg, Intravenous, Q24H  budesonide, 1 mg, Nebulization, BID - RT  carvedilol, 25 mg, Oral, BID With Meals  cefTRIAXone, 2,000 mg, Intravenous, Q24H  enoxaparin, 0.7 mg/kg, Subcutaneous, Q12H  ferrous sulfate, 324 mg, Oral, Every Other Day  lisinopril, 20 mg, Oral, Q24H   And  hydroCHLOROthiazide, 12.5 mg, Oral, Q24H  ipratropium-albuterol, 3 mL, Nebulization, 4x Daily - RT  melatonin, 5 mg, Oral, Nightly  methylPREDNISolone sodium succinate, 40 mg, Intravenous, Q6H  sodium chloride, 10 mL, Intravenous, Q12H  triamcinolone, , Topical, Q12H      Continuous Infusions:Pharmacy Consult,       PRN Meds:  acetaminophen    senna-docusate sodium **AND** polyethylene glycol **AND** bisacodyl **AND** bisacodyl    calcium carbonate    Calcium Replacement - Follow Nurse / BPA Driven Protocol    Magnesium Standard Dose Replacement - Follow Nurse / BPA Driven Protocol     "nitroglycerin    ondansetron ODT **OR** ondansetron    Pharmacy Consult    Phosphorus Replacement - Follow Nurse / BPA Driven Protocol    Potassium Replacement - Follow Nurse / BPA Driven Protocol    sodium chloride    sodium chloride    sodium chloride      OBJECTIVE    Vital Signs  Vitals:    03/03/24 0916 03/03/24 1042 03/03/24 1136 03/03/24 1205   BP: 113/66      BP Location:       Patient Position:       Pulse:  109 109 112   Resp:    28   Temp:       TempSrc:       SpO2:  90% 90% 90%   Weight: (!) 202 kg (445 lb 5.3 oz)      Height: 188 cm (74.02\")          Flowsheet Rows      Flowsheet Row First Filed Value   Admission Height 188 cm (74\") Documented at 03/02/2024 2243   Admission Weight 202 kg (445 lb) Documented at 03/02/2024 2243            No intake or output data in the 24 hours ending 03/03/24 1215     Telemetry:   Sinus rhythm/sinus tachycardia    Physical Exam:  The patient is alert, oriented and in no distress.  Morbidly obese  Vital signs as noted above.  Head and neck revealed no carotid bruits or jugular venous distention.  No thyromegaly or lymphadenopathy is present  Lungs clear.  No wheezing.  Breath sounds are normal bilaterally.  Heart: Normal first and second heart sounds. No murmur.  No precordial rub is present.  No gallop is present.  Abdomen: Soft and nontender.  No organomegaly is present.  Extremities with good peripheral pulses without any pedal edema.  Skin: Warm and dry.  Musculoskeletal system is grossly normal.  CNS grossly normal.       Results Review:  I have personally reviewed the results from the time of this admission to 3/3/2024 12:15 EST and agree with these findings:  []  Laboratory  []  Microbiology  []  Radiology  []  EKG/Telemetry   []  Cardiology/Vascular   []  Pathology  []  Old records  []  Other:    Most notable findings include:     Lab Results (last 24 hours)       Procedure Component Value Units Date/Time    Comprehensive Metabolic Panel [[id number]]  (Abnormal) " Collected: 03/03/24 0701    Specimen: Blood Updated: 03/03/24 0807     Glucose 171 mg/dL      BUN 16 mg/dL      Creatinine 0.97 mg/dL      Sodium 132 mmol/L      Potassium 4.6 mmol/L      Chloride 98 mmol/L      CO2 20.2 mmol/L      Calcium 9.2 mg/dL      Total Protein 7.8 g/dL      Albumin 3.9 g/dL      ALT (SGPT) 19 U/L      AST (SGOT) 15 U/L      Alkaline Phosphatase 101 U/L      Total Bilirubin 0.5 mg/dL      Globulin 3.9 gm/dL      A/G Ratio 1.0 g/dL      BUN/Creatinine Ratio 16.5     Anion Gap 13.8 mmol/L      eGFR 109.0 mL/min/1.73     Narrative:      GFR Normal >60  Chronic Kidney Disease <60  Kidney Failure <15      CBC (No Diff) [[id number]]  (Abnormal) Collected: 03/03/24 0701    Specimen: Blood Updated: 03/03/24 0750     WBC 13.68 10*3/mm3      RBC 5.16 10*6/mm3      Hemoglobin 11.3 g/dL      Hematocrit 36.2 %      MCV 70.2 fL      MCH 21.9 pg      MCHC 31.2 g/dL      RDW 18.5 %      RDW-SD 44.2 fl      MPV 9.8 fL      Platelets 246 10*3/mm3     Respiratory Panel PCR w/COVID-19(SARS-CoV-2) [facility] In-House, NP Swab in UTM/VTM, 2 HR TAT - Swab, Nasopharynx [[id number]]  (Normal) Collected: 03/03/24 0225    Specimen: Swab from Nasopharynx Updated: 03/03/24 0315     ADENOVIRUS, PCR Not Detected     Coronavirus 229E Not Detected     Coronavirus HKU1 Not Detected     Coronavirus NL63 Not Detected     Coronavirus OC43 Not Detected     COVID19 Not Detected     Human Metapneumovirus Not Detected     Human Rhinovirus/Enterovirus Not Detected     Influenza A PCR Not Detected     Influenza B PCR Not Detected     Parainfluenza Virus 1 Not Detected     Parainfluenza Virus 2 Not Detected     Parainfluenza Virus 3 Not Detected     Parainfluenza Virus 4 Not Detected     RSV, PCR Not Detected     Bordetella pertussis pcr Not Detected     Bordetella parapertussis PCR Not Detected     Chlamydophila pneumoniae PCR Not Detected     Mycoplasma pneumo by PCR Not Detected    Narrative:      In the setting of a  "positive respiratory panel with a viral infection PLUS a negative procalcitonin without other underlying concern for bacterial infection, consider observing off antibiotics or discontinuation of antibiotics and continue supportive care. If the respiratory panel is positive for atypical bacterial infection (Bordetella pertussis, Chlamydophila pneumoniae, or Mycoplasma pneumoniae), consider antibiotic de-escalation to target atypical bacterial infection.    Iron Profile [[id number]]  (Abnormal) Collected: 03/02/24 2251    Specimen: Blood Updated: 03/03/24 0256     Iron 32 mcg/dL      Iron Saturation (TSAT) 8 %      Transferrin 272 mg/dL      TIBC 405 mcg/dL     Procalcitonin [[id number]]  (Normal) Collected: 03/02/24 2251    Specimen: Blood Updated: 03/03/24 0226     Procalcitonin 0.07 ng/mL     Narrative:      As a Marker for Sepsis (Non-Neonates):    1. <0.5 ng/mL represents a low risk of severe sepsis and/or septic shock.  2. >2 ng/mL represents a high risk of severe sepsis and/or septic shock.    As a Marker for Lower Respiratory Tract Infections that require antibiotic therapy:    PCT on Admission    Antibiotic Therapy       6-12 Hrs later    >0.5                Strongly Recommended  >0.25 - <0.5        Recommended   0.1 - 0.25          Discouraged              Remeasure/reassess PCT  <0.1                Strongly Discouraged     Remeasure/reassess PCT    As 28 day mortality risk marker: \"Change in Procalcitonin Result\" (>80% or <=80%) if Day 0 (or Day 1) and Day 4 values are available. Refer to http://[email or web address]-pct-calculator.com    Change in PCT <=80%  A decrease of PCT levels below or equal to 80% defines a positive change in PCT test result representing a higher risk for 28-day all-cause mortality of patients diagnosed with severe sepsis for septic shock.    Change in PCT >80%  A decrease of PCT levels of more than 80% defines a negative change in PCT result representing a lower risk for 28-day all-cause " "mortality of patients diagnosed with severe sepsis or septic shock.       D-dimer, Quantitative [[id number]]  (Abnormal) Collected: 03/02/24 2251    Specimen: Blood Updated: 03/03/24 0215     D-Dimer, Quantitative 3.98 MCGFEU/mL     Narrative:      According to the assay 's published package insert, a normal (<0.50 MCGFEU/mL) D-dimer result in conjunction with a non-high clinical probability assessment, excludes deep vein thrombosis (DVT) and pulmonary embolism (PE) with high sensitivity.    D-dimer values increase with age and this can make VTE exclusion of an older population difficult. To address this, the [employer], based on best available evidence and recent guidelines, recommends that clinicians use age-adjusted D-dimer thresholds in patients greater than 50 years of age with: a) a low probability of PE who do not meet all Pulmonary Embolism Rule Out Criteria, or b) in those with intermediate probability of PE.   The formula for an age-adjusted D-dimer cut-off is \"age/100\".  For example, a 60 year old patient would have an age-adjusted cut-off of 0.60 MCGFEU/mL and an 80 year old 0.80 MCGFEU/mL.    [facility] [[id number]] Collected: 03/02/24 2251    Specimen: Blood Updated: 03/03/24 0000    Narrative:      The following orders were created for panel order [facility].  Procedure                               Abnormality         Status                     ---------                               -----------         ------                     Green Top (Gel)[[id number]]                                  Final result               Lavender Top[[id number]]                                     Final result               Gold Top - SST[[id number]]                                   Final result               Light Blue Top[[id number]]                                   Final result                 Please view results for these tests on the individual orders.    Lavender Top [[id number]] " Collected: 03/02/24 2251    Specimen: Blood Updated: 03/03/24 0000     Extra Tube hold for add-on     Comment: Auto resulted       Green Top (Gel) [[id number]] Collected: 03/02/24 2251    Specimen: Blood Updated: 03/03/24 0000     Extra Tube Hold for add-ons.     Comment: Auto resulted.       Gold Top - SST [[id number]] Collected: 03/02/24 2251    Specimen: Blood Updated: 03/03/24 0000     Extra Tube Hold for add-ons.     Comment: Auto resulted.       Light Blue Top [[id number]] Collected: 03/02/24 2251    Specimen: Blood Updated: 03/03/24 0000     Extra Tube Hold for add-ons.     Comment: Auto resulted       BNP [[id number]]  (Normal) Collected: 03/02/24 2251    Specimen: Blood Updated: 03/02/24 2318     proBNP 181.4 pg/mL     Narrative:      This assay is used as an aid in the diagnosis of individuals suspected of having heart failure. It can be used as an aid in the diagnosis of acute decompensated heart failure (ADHF) in patients presenting with signs and symptoms of ADHF to the emergency department (ED). In addition, NT-proBNP of <300 pg/mL indicates ADHF is not likely.    Age Range Result Interpretation  NT-proBNP Concentration (pg/mL:      <50             Positive            >450                   Gray                 300-450                    Negative             <300    50-75           Positive            >900                  Gray                300-900                  Negative            <300      >75             Positive            >1800                  Gray                300-1800                  Negative            <300    Single High Sensitivity Troponin T [[id number]]  (Normal) Collected: 03/02/24 2251    Specimen: Blood Updated: 03/02/24 2318     HS Troponin T 21 ng/L     Narrative:      High Sensitive Troponin T Reference Range:  <14.0 ng/L- Negative Female for AMI  <22.0 ng/L- Negative Male for AMI  >=14 - Abnormal Female indicating possible myocardial injury.  >=22 - Abnormal Male indicating  possible myocardial injury.   Clinicians would have to utilize clinical acumen, EKG, Troponin, and serial changes to determine if it is an Acute Myocardial Infarction or myocardial injury due to an underlying chronic condition.         Comprehensive Metabolic Panel [[id number]]  (Abnormal) Collected: 03/02/24 2251    Specimen: Blood Updated: 03/02/24 2316     Glucose 99 mg/dL      BUN 12 mg/dL      Creatinine 0.91 mg/dL      Sodium 134 mmol/L      Potassium 4.4 mmol/L      Chloride 99 mmol/L      CO2 23.7 mmol/L      Calcium 9.2 mg/dL      Total Protein 7.9 g/dL      Albumin 4.0 g/dL      ALT (SGPT) 20 U/L      AST (SGOT) 16 U/L      Alkaline Phosphatase 96 U/L      Total Bilirubin 0.5 mg/dL      Globulin 3.9 gm/dL      A/G Ratio 1.0 g/dL      BUN/Creatinine Ratio 13.2     Anion Gap 11.3 mmol/L      eGFR 117.7 mL/min/1.73     Narrative:      GFR Normal >60  Chronic Kidney Disease <60  Kidney Failure <15      CBC & Differential [[id number]]  (Abnormal) Collected: 03/02/24 2251    Specimen: Blood Updated: 03/02/24 2310    Narrative:      The following orders were created for panel order CBC & Differential.  Procedure                               Abnormality         Status                     ---------                               -----------         ------                     CBC Auto Differential[[id number]]        Abnormal            Final result               Scan Slide[[id number]]                                       Final result                 Please view results for these tests on the individual orders.    Scan Slide [[id number]] Collected: 03/02/24 2251    Specimen: Blood Updated: 03/02/24 2310     Anisocytosis Slight/1+     Microcytes Mod/2+     WBC Morphology Normal     Platelet Estimate Adequate    CBC Auto Differential [[id number]]  (Abnormal) Collected: 03/02/24 2251    Specimen: Blood Updated: 03/02/24 2258     WBC 13.48 10*3/mm3      RBC 5.18 10*6/mm3      Hemoglobin 11.6 g/dL      Hematocrit 37.0 %       MCV 71.4 fL      MCH 22.4 pg      MCHC 31.4 g/dL      RDW 18.2 %      RDW-SD 44.6 fl      MPV 9.3 fL      Platelets 249 10*3/mm3      Neutrophil % 77.5 %      Lymphocyte % 13.6 %      Monocyte % 6.2 %      Eosinophil % 1.3 %      Basophil % 0.4 %      Immature Grans % 1.0 %      Neutrophils, Absolute 10.44 10*3/mm3      Lymphocytes, Absolute 1.84 10*3/mm3      Monocytes, Absolute 0.84 10*3/mm3      Eosinophils, Absolute 0.18 10*3/mm3      Basophils, Absolute 0.05 10*3/mm3      Immature Grans, Absolute 0.13 10*3/mm3      nRBC 0.0 /100 WBC             Imaging Results (Last 24 Hours)       Procedure Component Value Units Date/Time    CT Angiogram Chest Pulmonary Embolism [[id number]] Collected: 03/03/24 0405     Updated: 03/03/24 0414    Addenda:        ADDENDUM REPORT    ADDENDUM:  This report was discussed with Dr. Sousa on Mar 03, 2024 04:12:00 EST.    Authenticated and Electronically Signed by Kia Overton MD  on 03/03/2024 04:13:02 AM  Signed: 03/03/24 0413 by Kia Overton MD    Narrative:      FINAL REPORT    TECHNIQUE:  null    CLINICAL HISTORY:  Pulmonary embolism (PE) suspected; SOA    COMPARISON:  null    FINDINGS:  CT angiography chest with contrast. 3D Postprocessing.    Comparison: None    Findings:    Heart size is approaching the upper limits in size. RV/LV ratio of 1.2.    Unremarkable thoracic aorta and great vessels. No aneurysm or dissection.    The visualized thyroid and mediastinum are unremarkable.    Symmetric gynecomastia.    There are multiple acute segmental and subsegmental pulmonary emboli throughout both lower lobes, middle lobe, and right upper lobe. No definite emboli within the left upper lobe.    Patchy consolidation left lower lobe mostly medially. A few small patchy opacities also seen in the right upper lobe near the hilum.    Probable fatty liver. The liver is also likely enlarged although incompletely included on the exam.    Splenomegaly.    The bones are  intact.      Impression:      IMPRESSION:    1. Large burden of bilateral acute pulmonary embolus. Right heart strain.    2. Right upper lobe and left lower lobe patchy lung opacities. Consider multifocal pneumonia.    3. Additional findings as above.    Authenticated and Electronically Signed by Kia Overton MD  on 03/03/2024 04:05:32 AM    XR Chest 1 View [[id number]] Collected: 03/03/24 0158     Updated: 03/03/24 0200    Narrative:      FINAL REPORT    TECHNIQUE:  null    CLINICAL HISTORY:  SOA, weakness, RSV 1 week ago    COMPARISON:  null    FINDINGS:  1 view chest    Comparison: None    Findings:    No consolidation or effusion.    No acute fracture.      Impression:      Impression:    1. Cardiomegaly without failure.    Authenticated and Electronically Signed by Kia Overton MD  on 03/03/2024 01:58:21 AM            LAB RESULTS (LAST 7 DAYS)    CBC  Results from last 7 days   Lab Units 03/03/24  0701 03/02/24  2251   WBC 10*3/mm3 13.68* 13.48*   RBC 10*6/mm3 5.16 5.18   HEMOGLOBIN g/dL 11.3* 11.6*   HEMATOCRIT % 36.2* 37.0*   MCV fL 70.2* 71.4*   PLATELETS 10*3/mm3 246 249       BMP  Results from last 7 days   Lab Units 03/03/24  0701 03/02/24  2251   SODIUM mmol/L 132* 134*   POTASSIUM mmol/L 4.6 4.4   CHLORIDE mmol/L 98 99   CO2 mmol/L 20.2* 23.7   BUN mg/dL 16 12   CREATININE mg/dL 0.97 0.91   GLUCOSE mg/dL 171* 99       CMP   Results from last 7 days   Lab Units 03/03/24  0701 03/02/24  2251   SODIUM mmol/L 132* 134*   POTASSIUM mmol/L 4.6 4.4   CHLORIDE mmol/L 98 99   CO2 mmol/L 20.2* 23.7   BUN mg/dL 16 12   CREATININE mg/dL 0.97 0.91   GLUCOSE mg/dL 171* 99   ALBUMIN g/dL 3.9 4.0   BILIRUBIN mg/dL 0.5 0.5   ALK PHOS U/L 101 96   AST (SGOT) U/L 15 16   ALT (SGPT) U/L 19 20       BNP        TROPONIN  Results from last 7 days   Lab Units 03/02/24  2251   HSTROP T ng/L 21       CoAg        Creatinine Clearance  Estimated Creatinine Clearance: 208.5 mL/min (by C-G formula based on SCr of 0.97  mg/dL).    ABG          Radiology  CT Angiogram Chest Pulmonary Embolism    Addendum Date: 3/3/2024    ADDENDUM REPORT ADDENDUM: This report was discussed with Dr. Sousa on Mar 03, 2024 04:12:00 EST. Authenticated and Electronically Signed by Kia Overton MD on 03/03/2024 04:13:02 AM    Result Date: 3/3/2024  IMPRESSION: 1. Large burden of bilateral acute pulmonary embolus. Right heart strain. 2. Right upper lobe and left lower lobe patchy lung opacities. Consider multifocal pneumonia. 3. Additional findings as above. Authenticated and Electronically Signed by Kia Overton MD on 03/03/2024 04:05:32 AM    XR Chest 1 View    Result Date: 3/3/2024  Impression: 1. Cardiomegaly without failure. Authenticated and Electronically Signed by Kia Overton MD on 03/03/2024 01:58:21 AM       EKG  I personally viewed and interpreted the patient's EKG/Telemetry data:  ECG 12 Lead ED Triage Standing Order; SOA   Final Result            Echocardiogram:    Results for orders placed during the hospital encounter of 03/02/24    Adult Transthoracic Echo Complete W/ Cont if Necessary Per Protocol    Interpretation Summary    Left ventricular ejection fraction appears to be 61 - 65%.    Left ventricular diastolic function was indeterminate.    The left atrial cavity is dilated.    No significant valvular abnormalities noted.    Insufficient TR jet to determine RVSP.    Due to body habitus intracardiac structures were poorly visualized despite the use of contrast.        Stress Test:        Cardiac Catheterization:  No results found for this or any previous visit.        Other:      ASSESSMENT & PLAN:    Principal Problem:    Hypoxemia  Active Problems:    Morbid obesity    Cor pulmonale (chronic)    Acute bronchitis    Positive D dimer    Microcytic anemia    Asthma    Acute pulmonary embolism  Large burden of bilateral acute pulmonary embolism involving bilateral middle and lower lobes and right upper lobe.  CT  suggested RV strain with RV/LV ratio of 1.2: 1   Unfortunately echo is unable to visualize right-sided structures due to body habitus.  At baseline he has had RV dilation with cor pulmonale in the setting of morbid obesity, obstructive sleep apnea.  Troponin is negative, proBNP is negative.  He is currently saturating more than 90% at 2 to 4 L of oxygen via nasal cannula  Respiratory failure combination of RSV and pulmonary embolism  Obtain bilateral lower extremity venous Doppler to look for DVT  Start full dose anticoagulation: Currently on Lovenox  Given his weight I think he will be better suited for warfarin compared to Eliquis/Xarelto.  He should continue 3 to 6 months of anticoagulation.  PE likely precipitated due to sedentary state  Outpatient follow-up with hematology for thrombophilia workup.  I have discussed EKOS versus mechanical thrombectomy versus oral anticoagulation with the patient.  After weighing the risk and benefit of all options available to him he has chosen oral anticoagulation.  I agree with this choice as there is no saddle or main pulmonary artery involvement.  In addition he is hemodynamically stable.    RSV  Recent RSV contributing to hypoxic respiratory failure  Supportive management  Wean off oxygen as tolerated    Hypertension  Well-controlled on amlodipine, Coreg and lisinopril  Uptitrate as needed    Morbid obesity  BMI 57  Obesity is complicating all aspects of his care  Lifestyle modifications discussed with the patient.      Florentino Gee MD  03/03/24  12:15 EST

## 2024-03-03 NOTE — PROGRESS NOTES
[facility]  INTERNAL MEDICINE PROGRESS NOTE    Name:  Jesús Mijares   Age:  28 y.o.  Sex:  male  :  1995  MRN:  [id number]   Visit Number:  [id number]  Admission Date:  3/2/2024  Date Of Service:  24  Primary Care Physician:  Chantal Gutierrez APRN     LOS: 0 days :  Patient Care Team:  Chantal Gutierrez APRN as PCP - General:      Subjective / Interval History:     28-year-old patient who has been admitted with a past medical history of asthma, hypertension, and was found to be hypoxic with a diagnosis of RSV by the primary care few days ago.  Today the workup done for hypoxemia showed that he has bilateral pulmonary emboli and cardiology consult has been done with decision to start oral anticoagulation  At present he is saturation is 90% on oxygen and with his breathing seems to be better at rest bilateral crepitations on the bases      Vital Signs:    Temp:  [97.7 °F (36.5 °C)] 97.7 °F (36.5 °C)  Heart Rate:  [] 112  Resp:  [18-28] 28  BP: (113-162)/(48-87) 113/66    Intake and output:    No intake/output data recorded.  No intake/output data recorded.    Physical Examination:    General Appearance:    Alert and cooperative, he is morbidly obese   Head:    Atraumatic and normocephalic, without obvious abnormality.   Eyes:            PERRLA,  No pallor. Extraocular movements are within normal limits.   Neck:   Supple,  No lymph glands, no bruit   Lungs:     Chest shape is normal.  Bilateral crepitations on the bases    Heart:    Normal S1 and S2, no murmur,  No JVD   Abdomen:     Normal bowel sounds, no masses, no organomegaly. Soft     nontender, no guarding, no rebound tenderness   Extremities:   Moves all extremities well, bilateral pitting edema, no cyanosis,    Skin:   No  bruising or rash.   Neurologic:   Grossly nonfocal and moves all extremities.      Laboratory results:  Results from last 7 days   Lab Units 24  0701 24  2251   SODIUM mmol/L  132* 134*   POTASSIUM mmol/L 4.6 4.4   CHLORIDE mmol/L 98 99   CO2 mmol/L 20.2* 23.7   BUN mg/dL 16 12   CREATININE mg/dL 0.97 0.91   CALCIUM mg/dL 9.2 9.2   BILIRUBIN mg/dL 0.5 0.5   ALK PHOS U/L 101 96   ALT (SGPT) U/L 19 20   AST (SGOT) U/L 15 16   GLUCOSE mg/dL 171* 99     Results from last 7 days   Lab Units 03/03/24  0701 03/02/24  2251   WBC 10*3/mm3 13.68* 13.48*   HEMOGLOBIN g/dL 11.3* 11.6*   HEMATOCRIT % 36.2* 37.0*   PLATELETS 10*3/mm3 246 249         Results from last 7 days   Lab Units 03/02/24  2251   HSTROP T ng/L 21           Radiology results:    Imaging Results (Last 24 Hours)       Procedure Component Value Units Date/Time    CT Angiogram Chest Pulmonary Embolism [[id number]] Collected: 03/03/24 0405     Updated: 03/03/24 0414    Addenda:        ADDENDUM REPORT    ADDENDUM:  This report was discussed with Dr. Sousa on Mar 03, 2024 04:12:00 EST.    Authenticated and Electronically Signed by Kia Overton MD  on 03/03/2024 04:13:02 AM  Signed: 03/03/24 0413 by Kia Overton MD    Narrative:      FINAL REPORT    TECHNIQUE:  null    CLINICAL HISTORY:  Pulmonary embolism (PE) suspected; SOA    COMPARISON:  null    FINDINGS:  CT angiography chest with contrast. 3D Postprocessing.    Comparison: None    Findings:    Heart size is approaching the upper limits in size. RV/LV ratio of 1.2.    Unremarkable thoracic aorta and great vessels. No aneurysm or dissection.    The visualized thyroid and mediastinum are unremarkable.    Symmetric gynecomastia.    There are multiple acute segmental and subsegmental pulmonary emboli throughout both lower lobes, middle lobe, and right upper lobe. No definite emboli within the left upper lobe.    Patchy consolidation left lower lobe mostly medially. A few small patchy opacities also seen in the right upper lobe near the hilum.    Probable fatty liver. The liver is also likely enlarged although incompletely included on the exam.    Splenomegaly.    The  bones are intact.      Impression:      IMPRESSION:    1. Large burden of bilateral acute pulmonary embolus. Right heart strain.    2. Right upper lobe and left lower lobe patchy lung opacities. Consider multifocal pneumonia.    3. Additional findings as above.    Authenticated and Electronically Signed by Kia Overton MD  on 03/03/2024 04:05:32 AM    XR Chest 1 View [[id number]] Collected: 03/03/24 0158     Updated: 03/03/24 0200    Narrative:      FINAL REPORT    TECHNIQUE:  null    CLINICAL HISTORY:  SOA, weakness, RSV 1 week ago    COMPARISON:  null    FINDINGS:  1 view chest    Comparison: None    Findings:    No consolidation or effusion.    No acute fracture.      Impression:      Impression:    1. Cardiomegaly without failure.    Authenticated and Electronically Signed by Kia Overton MD  on 03/03/2024 01:58:21 AM            I have reviewed the patient's radiology reports.    Medication Review:     I have reviewed the patients active and prn medications.     Assessment:      Hypoxemia    Morbid obesity    Cor pulmonale (chronic)    Acute bronchitis    Positive D dimer    Microcytic anemia    Asthma  Bilateral PE        Plan:    1.  Bilateral PE-after discussion with cardiologist it was decided medical management compared to EKOS or mechanical thrombectomy  Start with full dose of Lovenox and will transition to Coumadin  Continue with oxygen and titrate to keep with saturation 90% and above    2.  Asthma-she does have RSV and the CT scan shows multifocal pneumonia but procalcitonin is very low and will cover him with antibiotic for 3 days as there is leukocytosis also for presumed secondary bacterial infection    Goals of treatment and plan of care has been addressed with the patient and the father who is at bedside.  He will be admitted as inpatient as will need more than 2 night stay    Shaq Guallpa MD  03/03/24  12:58 EST      Please note that portions of this note were completed with a  voice recognition program.

## 2024-03-03 NOTE — ED PROVIDER NOTES
"Subjective:  History of Present Illness:    Patient is a 28-year-old male with recent history of RSV.  Patient reports on 2023 he was diagnosed with RSV.  He reports since diagnosis have progressively gotten more short of breath.  He denies chest pain.  Denies OTC medication home remedy.  Denies alleviating exacerbating factors.    Nurses Notes reviewed and agree, including vitals, allergies, social history and prior medical history.     REVIEW OF SYSTEMS: All systems reviewed and not pertinent unless noted.  Review of Systems   Respiratory:  Positive for shortness of breath.    All other systems reviewed and are negative.      Past Medical History:   Diagnosis Date    Asthma, intrinsic     Bronchitis     Hypertension        Allergies:    Cherry flavor and Lorabid [loracarbef]      History reviewed. No pertinent surgical history.      Social History     Socioeconomic History    Marital status: Single   Tobacco Use    Smoking status: Former     Current packs/day: 0.00     Types: Cigarettes     Quit date:      Years since quittin.1    Smokeless tobacco: Never    Tobacco comments:     patient smoke 3 months   Vaping Use    Vaping status: Never Used   Substance and Sexual Activity    Alcohol use: No    Drug use: No    Sexual activity: Defer         Family History   Problem Relation Age of Onset    Diabetes Father     Hypertension Other        Objective  Physical Exam:  /87 (BP Location: Left arm, Patient Position: Sitting)   Pulse 116   Temp 97.7 °F (36.5 °C) (Oral)   Resp 18   Ht 188 cm (74\")   Wt (!) 202 kg (445 lb)   SpO2 96%   BMI 57.13 kg/m²      Physical Exam  Vitals and nursing note reviewed.   Constitutional:       Appearance: He is well-developed and normal weight.   HENT:      Head: Normocephalic and atraumatic.      Mouth/Throat:      Mouth: Mucous membranes are moist.      Pharynx: Oropharynx is clear.   Eyes:      Extraocular Movements: Extraocular movements intact.      Pupils: " Pupils are equal, round, and reactive to light.   Cardiovascular:      Rate and Rhythm: Normal rate and regular rhythm.   Pulmonary:      Effort: Pulmonary effort is normal.      Breath sounds: Normal breath sounds.   Abdominal:      General: Bowel sounds are normal.      Palpations: Abdomen is soft.   Musculoskeletal:         General: Normal range of motion.      Cervical back: Normal range of motion and neck supple.   Skin:     General: Skin is warm.      Capillary Refill: Capillary refill takes less than 2 seconds.   Neurological:      General: No focal deficit present.      Mental Status: He is alert and oriented to person, place, and time.   Psychiatric:         Mood and Affect: Mood normal.         Behavior: Behavior normal.         Procedures    ED Course:         Lab Results (last 24 hours)       Procedure Component Value Units Date/Time    CBC & Differential [[id number]]  (Abnormal) Collected: 03/02/24 2251    Specimen: Blood Updated: 03/02/24 2310    Narrative:      The following orders were created for panel order CBC & Differential.  Procedure                               Abnormality         Status                     ---------                               -----------         ------                     CBC Auto Differential[[id number]]        Abnormal            Final result               Scan Slide[[id number]]                                       Final result                 Please view results for these tests on the individual orders.    Comprehensive Metabolic Panel [[id number]]  (Abnormal) Collected: 03/02/24 2251    Specimen: Blood Updated: 03/02/24 2316     Glucose 99 mg/dL      BUN 12 mg/dL      Creatinine 0.91 mg/dL      Sodium 134 mmol/L      Potassium 4.4 mmol/L      Chloride 99 mmol/L      CO2 23.7 mmol/L      Calcium 9.2 mg/dL      Total Protein 7.9 g/dL      Albumin 4.0 g/dL      ALT (SGPT) 20 U/L      AST (SGOT) 16 U/L      Alkaline Phosphatase 96 U/L      Total Bilirubin 0.5 mg/dL       Globulin 3.9 gm/dL      A/G Ratio 1.0 g/dL      BUN/Creatinine Ratio 13.2     Anion Gap 11.3 mmol/L      eGFR 117.7 mL/min/1.73     Narrative:      GFR Normal >60  Chronic Kidney Disease <60  Kidney Failure <15      BNP [[id number]]  (Normal) Collected: 03/02/24 2251    Specimen: Blood Updated: 03/02/24 2318     proBNP 181.4 pg/mL     Narrative:      This assay is used as an aid in the diagnosis of individuals suspected of having heart failure. It can be used as an aid in the diagnosis of acute decompensated heart failure (ADHF) in patients presenting with signs and symptoms of ADHF to the emergency department (ED). In addition, NT-proBNP of <300 pg/mL indicates ADHF is not likely.    Age Range Result Interpretation  NT-proBNP Concentration (pg/mL:      <50             Positive            >450                   Gray                 300-450                    Negative             <300    50-75           Positive            >900                  Gray                300-900                  Negative            <300      >75             Positive            >1800                  Gray                300-1800                  Negative            <300    Single High Sensitivity Troponin T [[id number]]  (Normal) Collected: 03/02/24 2251    Specimen: Blood Updated: 03/02/24 2318     HS Troponin T 21 ng/L     Narrative:      High Sensitive Troponin T Reference Range:  <14.0 ng/L- Negative Female for AMI  <22.0 ng/L- Negative Male for AMI  >=14 - Abnormal Female indicating possible myocardial injury.  >=22 - Abnormal Male indicating possible myocardial injury.   Clinicians would have to utilize clinical acumen, EKG, Troponin, and serial changes to determine if it is an Acute Myocardial Infarction or myocardial injury due to an underlying chronic condition.         CBC Auto Differential [[id number]]  (Abnormal) Collected: 03/02/24 2251    Specimen: Blood Updated: 03/02/24 2258     WBC 13.48 10*3/mm3      RBC 5.18 10*6/mm3       Hemoglobin 11.6 g/dL      Hematocrit 37.0 %      MCV 71.4 fL      MCH 22.4 pg      MCHC 31.4 g/dL      RDW 18.2 %      RDW-SD 44.6 fl      MPV 9.3 fL      Platelets 249 10*3/mm3      Neutrophil % 77.5 %      Lymphocyte % 13.6 %      Monocyte % 6.2 %      Eosinophil % 1.3 %      Basophil % 0.4 %      Immature Grans % 1.0 %      Neutrophils, Absolute 10.44 10*3/mm3      Lymphocytes, Absolute 1.84 10*3/mm3      Monocytes, Absolute 0.84 10*3/mm3      Eosinophils, Absolute 0.18 10*3/mm3      Basophils, Absolute 0.05 10*3/mm3      Immature Grans, Absolute 0.13 10*3/mm3      nRBC 0.0 /100 WBC     Scan Slide [[id number]] Collected: 03/02/24 2251    Specimen: Blood Updated: 03/02/24 2310     Anisocytosis Slight/1+     Microcytes Mod/2+     WBC Morphology Normal     Platelet Estimate Adequate             XR Chest 1 View    Result Date: 3/3/2024  FINAL REPORT TECHNIQUE: null CLINICAL HISTORY: SOA, weakness, RSV 1 week ago COMPARISON: null FINDINGS: 1 view chest Comparison: None Findings: No consolidation or effusion. No acute fracture.     Impression: Impression: 1. Cardiomegaly without failure. Authenticated and Electronically Signed by Kia Overton MD on 03/03/2024 01:58:21 AM        MDM     Amount and/or Complexity of Data Reviewed  Clinical lab tests: reviewed  Tests in the radiology section of CPT®: reviewed  Tests in the medicine section of CPT®: reviewed        Initial impression of presenting illness: Patient is a 28-year-old male with recent history of RSV.  Patient reports on 2/27/2023 he was diagnosed with RSV.  He reports since diagnosis have progressively gotten more short of breath.  He denies chest pain.  Denies OTC medication home remedy.  Denies alleviating exacerbating factors.    DDX: includes but is not limited to: COVID, flu, rhinovirus or other    Patient arrives stable with vitals interpreted by myself.     Pertinent features from physical exam: Lung sounds are diminished bilaterally.  Abdomen soft  nontender.  Bowel sounds are normal.  Heart sounds normal..    Initial diagnostic plan: CBC, CMP, troponin, BNP, procalcitonin, D-dimer, chest x-ray    Results from initial plan were reviewed and interpreted by me revealing CBC with mild anemia.  CMP is within appropriate range.  Troponin is negative.  BNP is negative.  Procalcitonin and D-dimer are pending.  Chest x-ray with following impression cardiomegaly without failure    Diagnostic information from other sources: Chart review    Interventions / Re-evaluation: Signs stable throughout encounter.  Patient received 10 mg dexamethasone.  DuoNeb and albuterol nebulizer.    Results/clinical rationale were discussed with patient    Consultations/Discussion of results with other physicians: Dr. Schmid.  He is agreeable to admit this patient to the hospital for hypoxia/RSV    Disposition plan: Admit patient to the hospital  -----   Pao Addendum:   Following admission to hospitalist CT pulmonary embolism study was obtained and radiologist contacted me to alert me of critical finding.  CT showed evidence of bilateral pulmonary emboli with right heart strain.  Immediately contacted hospitalist and informed him of this critical result to which he voiced understanding.    Final diagnoses:   RSV infection   Hypoxia          Adi Abraham, APRN  03/03/24 0204       Dominic Cedeno MD  03/03/24 3879

## 2024-03-03 NOTE — PHARMACY RECOMMENDATION
"Pharmacy Consult  -  Warfarin    Jesús Mijares is a  28 y.o. male , pharmacy consulted for warfarin dosing  Height - 188 cm (74.02\")  Weight - (!) 202 kg (445 lb 5.3 oz)    Consulting Provider: - Dr. Florentino Gee  Indication: - acute pulmonary embolism  Goal INR: -  2-3  Home Regimen:  New start warfarin    Bridge Therapy: Yes   enoxaparin 0.7 mg/kg subq q 12 hrs    Drug-Drug Interactions with current regimen:  azithromycin -- may enhance the anticoagulant effect of warfarin (moderate severity)    Warfarin Dosing During Admission:    Date  3/3           INR  1.08           Dose  7.5 mg                Labs:  Results from last 7 days   Lab Units 03/03/24  1320 03/03/24  0701 03/02/24  2251   INR  1.08  --   --    HEMOGLOBIN g/dL  --  11.3* 11.6*   HEMATOCRIT %  --  36.2* 37.0*   PLATELETS 10*3/mm3  --  246 249     Results from last 7 days   Lab Units 03/03/24  0701 03/02/24  2251   SODIUM mmol/L 132* 134*   POTASSIUM mmol/L 4.6 4.4   CHLORIDE mmol/L 98 99   CO2 mmol/L 20.2* 23.7   BUN mg/dL 16 12   CREATININE mg/dL 0.97 0.91   CALCIUM mg/dL 9.2 9.2   BILIRUBIN mg/dL 0.5 0.5   ALK PHOS U/L 101 96   ALT (SGPT) U/L 19 20   AST (SGOT) U/L 15 16   GLUCOSE mg/dL 171* 99       Current dietary intake:   Diet Order   Procedures    Diet: Regular/House; Fluid Consistency: Thin (IDDSI 0)       Assessment/Plan:     Warfarin being dosed for acute PE with INR goal 2.0 to 3.0.     Patient is newly started on warfarin.  Will give warfarin 7.5 mg today.  Daily PT/INR ordered.  Monitor signs/symptoms of bleeding, dietary intake, and drug-drug interactions. Make dose adjustments as necessary.  Pharmacy will continue to follow.      Thank you for the opportunity to consult on this patient.    Gonzalo Mederos [facility], Pharm.D.  03/03/24  13:51 EST    "

## 2024-03-03 NOTE — H&P
[facility]   HISTORY AND PHYSICAL      Name:  Jesús Mijares   Age:  28 y.o.  Sex:  male  :  1995  MRN:  [id number]   Visit Number:  [id number]  Admission Date:  3/2/2024  Date Of Service:  24  Primary Care Physician:  Chantal Gutierrez APRN    Chief Complaint:     Dyspnea    History Of Presenting Illness:      28-year-old gentleman who provides his own history and also his mom supplements by phone.  Past history of asthma and hypertension as well as morbid obesity and cor pulmonale.  Reports that he was working at [employer] back on  at which time he was having shortness of breath at work and so they sent him home.  He went to his primary care and they diagnosed him with RSV.  He was started on doxycycline.  He had had persistent dyspnea and hypoxia and a cough productive of mucus since that time.  However his hypoxia had worsened at home which has been monitored by his mother and so she sent him to the emergency department.  In the emergency department they gave him steroids and nebulizer treatments and attempted ambulation but he remained hypoxic.  He elects to be full code.    Pertinent findings: Highly sensitive troponin 21, proBNP 181.4, sodium 134, D-dimer 3.98, WBC 13.48, hemoglobin 11.6, MCV 71.4, chest x-ray showing cardiomegaly without failure, EKG showing sinus tachycardia without acute ST or T wave changes    ED Medications:    Medications   sodium chloride 0.9 % flush 10 mL (has no administration in time range)   dexAMETHasone sodium phosphate injection 10 mg (10 mg Intravenous Given 3/2/24 0669)   ipratropium-albuterol (DUO-NEB) nebulizer solution 3 mL (3 mL Nebulization Given 3/2/24 2327)   albuterol (PROVENTIL) nebulizer solution 0.083% 2.5 mg/3mL (2.5 mg Nebulization Given 3/3/24 0144)       Edited by: Fernando Schmid DO at 3/3/2024 0231     Review Of Systems:    All systems were reviewed and negative except as mentioned in history of  "presenting illness, assessment and plan.    Past Medical History: Patient  has a past medical history of Asthma, intrinsic, Bronchitis, and Hypertension.    Past Surgical History: Patient  has no past surgical history on file.    Social History: Patient  reports that he quit smoking about 9 years ago. His smoking use included cigarettes. He has never used smokeless tobacco. He reports that he does not drink alcohol and does not use drugs.    Family History:  Patient's family history has been reviewed and found to be noncontributory.     Allergies:      Cherry flavor and Lorabid [loracarbef]    Home Medications:    Prior to Admission Medications       Prescriptions Last Dose Informant Patient Reported? Taking?    amLODIPine (NORVASC) 5 MG tablet   No No    Take 1 tablet by mouth Daily.    carvedilol (COREG) 25 MG tablet   No No    Take 1 tablet by mouth 2 (Two) Times a Day With Meals.    lisinopril-hydrochlorothiazide (PRINZIDE,ZESTORETIC) 20-12.5 MG per tablet   No No    Take 1 tablet by mouth Daily. 200001    triamcinolone (KENALOG) 0.025 % cream   Yes No    APPLY TOPICALLY TO THE LEFT THIGH TWICE DAILY              Vital Signs:  Temp:  [97.7 °F (36.5 °C)] 97.7 °F (36.5 °C)  Heart Rate:  [] 92  Resp:  [18] 18  BP: (113-162)/(48-87) 113/66        03/02/24  2243   Weight: (!) 202 kg (445 lb)     Body mass index is 57.13 kg/m².    Physical Exam:     Most recent vital Signs: /66   Pulse 92   Temp 97.7 °F (36.5 °C) (Oral)   Resp 18   Ht 188 cm (74\")   Wt (!) 202 kg (445 lb)   SpO2 91%   BMI 57.13 kg/m²     Constitutional: Awake, alert, morbidly obese  Eyes: PERRLA, sclerae anicteric, no conjunctival injection  HENT: NCAT, mucous membranes moist  Neck: Supple, no thyromegaly, no lymphadenopathy, trachea midline  Respiratory: Breath sounds are distant, upon deep insertion of the stethoscope auscultation is clear without wheeze rales or rhonchi bilaterally, nonlabored respirations at rest but moderately " "labored just sitting up in bed  Cardiovascular: Tachycardic but regular, no murmurs, rubs, or gallops, palpable pedal pulses bilaterally  Gastrointestinal: Positive bowel sounds, soft, nontender, nondistended  Musculoskeletal: No bilateral ankle edema, no clubbing or cyanosis to extremities  Psychiatric: Appropriate affect, cooperative  Neurologic: Oriented x 3, speech clear  Skin: No rashes  Edited by: Fernando Schmid DO at 3/3/2024 0231      Laboratory data:    I have reviewed the labs done in the emergency room.    Results from last 7 days   Lab Units 03/02/24  2251   SODIUM mmol/L 134*   POTASSIUM mmol/L 4.4   CHLORIDE mmol/L 99   CO2 mmol/L 23.7   BUN mg/dL 12   CREATININE mg/dL 0.91   CALCIUM mg/dL 9.2   BILIRUBIN mg/dL 0.5   ALK PHOS U/L 96   ALT (SGPT) U/L 20   AST (SGOT) U/L 16   GLUCOSE mg/dL 99     Results from last 7 days   Lab Units 03/02/24  2251   WBC 10*3/mm3 13.48*   HEMOGLOBIN g/dL 11.6*   HEMATOCRIT % 37.0*   PLATELETS 10*3/mm3 249         Results from last 7 days   Lab Units 03/02/24  2251   HSTROP T ng/L 21     Results from last 7 days   Lab Units 03/02/24  2251   PROBNP pg/mL 181.4                       Invalid input(s): \"USDES\", \"NITRITITE\", \"BACT\", \"EP\"    Pain Management Panel           No data to display                EKG:      Sinus tachycardia no acute ST or T wave changes noted    Radiology:    XR Chest 1 View    Result Date: 3/3/2024  FINAL REPORT TECHNIQUE: null CLINICAL HISTORY: SOA, weakness, RSV 1 week ago COMPARISON: null FINDINGS: 1 view chest Comparison: None Findings: No consolidation or effusion. No acute fracture.     Impression: 1. Cardiomegaly without failure. Authenticated and Electronically Signed by Kia Overton MD on 03/03/2024 01:58:21 AM     Assessment/Plan:      Hypoxemia    Morbid obesity    Cor pulmonale (chronic)    Acute bronchitis    Positive D dimer    Microcytic anemia    Asthma        -Observation telemetry.  The differential for his hypoxemia is " likely multifactorial.  It may be just acute bronchitis coupled due to RSV in the setting of asthma. Will check viral respiratory panel.  Procalcitonin very low speaks against superimposed bacterial infection.  Provide azithromycin for its anti-inflammatory properties in URI, IV steroids, nebulizer treatments including steroids and bronchodilators. Patient does have elevated D-dimer however his risk of anticoagulation is significant with anemia.  Will check a CTA.  Will check iron panel.  If he is found to have iron deficiency he may need a colonoscopy outpatient.  Also patient may have obesity hypoventilation syndrome playing a role.  Given history of cor pulmonale and worsening hypoxia will check a 2D echo.  Would benefit from pulmonology referral outpatient or consult inpatient if not improving.  Edited by: Fernando Schmid DO at 3/3/2024 0240     Update: CTA showed bilateral pulmonary emboli and multifocal pneumonia.  Concern for right heart strain.  Blood pressure is stable.  Will discuss with cardiology in the morning possibility of catheter directed therapy.  Also added iron for him as his iron come back low and added ceftriaxone.      Risk Assessment: Moderate  DVT Prophylaxis: Lovenox  Code Status:   Code Status and Medical Interventions:   Ordered at: 03/03/24 0238     Code Status (Patient has no pulse and is not breathing):    CPR (Attempt to Resuscitate)     Medical Interventions (Patient has pulse or is breathing):    Full Support      Diet:   Dietary Orders (From admission, onward)       Start     Ordered    03/03/24 0240  Diet: Regular/House; Fluid Consistency: Thin (IDDSI 0)  Diet Effective Now        References:    Diet Order Crosswalk   Question Answer Comment   Diets: Regular/House    Fluid Consistency: Thin (IDDSI 0)        03/03/24 0239                     Advance Care Planning   ACP discussion was held with the patient during this visit. Patient does not have an advance directive, declines  further assistance.           Fernando Schmid DO  03/03/24  02:40 EST    Dictated utilizing Dragon dictation.

## 2024-03-04 ENCOUNTER — APPOINTMENT (OUTPATIENT)
Dept: ULTRASOUND IMAGING | Facility: HOSPITAL | Age: 29
End: 2024-03-04
Payer: COMMERCIAL

## 2024-03-04 LAB
ALBUMIN SERPL-MCNC: 4 G/DL (ref 3.5–5.2)
ALBUMIN/GLOB SERPL: 1 G/DL
ALP SERPL-CCNC: 94 U/L (ref 39–117)
ALT SERPL W P-5'-P-CCNC: 18 U/L (ref 1–41)
ANION GAP SERPL CALCULATED.3IONS-SCNC: 12.8 MMOL/L (ref 5–15)
ANISOCYTOSIS BLD QL: NORMAL
AST SERPL-CCNC: 18 U/L (ref 1–40)
BASOPHILS # BLD AUTO: 0.03 10*3/MM3 (ref 0–0.2)
BASOPHILS NFR BLD AUTO: 0.1 % (ref 0–1.5)
BILIRUB SERPL-MCNC: 0.4 MG/DL (ref 0–1.2)
BUN SERPL-MCNC: 21 MG/DL (ref 6–20)
BUN/CREAT SERPL: 25.9 (ref 7–25)
CALCIUM SPEC-SCNC: 9.3 MG/DL (ref 8.6–10.5)
CHLORIDE SERPL-SCNC: 98 MMOL/L (ref 98–107)
CO2 SERPL-SCNC: 21.2 MMOL/L (ref 22–29)
CREAT SERPL-MCNC: 0.81 MG/DL (ref 0.76–1.27)
DACRYOCYTES BLD QL SMEAR: NORMAL
DEPRECATED RDW RBC AUTO: 44.5 FL (ref 37–54)
EGFRCR SERPLBLD CKD-EPI 2021: 123.2 ML/MIN/1.73
ELLIPTOCYTES BLD QL SMEAR: NORMAL
EOSINOPHIL # BLD AUTO: 0 10*3/MM3 (ref 0–0.4)
EOSINOPHIL NFR BLD AUTO: 0 % (ref 0.3–6.2)
ERYTHROCYTE [DISTWIDTH] IN BLOOD BY AUTOMATED COUNT: 18 % (ref 12.3–15.4)
GLOBULIN UR ELPH-MCNC: 3.9 GM/DL
GLUCOSE SERPL-MCNC: 157 MG/DL (ref 65–99)
HCT VFR BLD AUTO: 36.7 % (ref 37.5–51)
HGB BLD-MCNC: 11.5 G/DL (ref 13–17.7)
IMM GRANULOCYTES # BLD AUTO: 0.27 10*3/MM3 (ref 0–0.05)
IMM GRANULOCYTES NFR BLD AUTO: 1.3 % (ref 0–0.5)
INR PPP: 1.12 (ref 0.9–1.1)
LYMPHOCYTES # BLD AUTO: 1.1 10*3/MM3 (ref 0.7–3.1)
LYMPHOCYTES NFR BLD AUTO: 5.4 % (ref 19.6–45.3)
MCH RBC QN AUTO: 22.3 PG (ref 26.6–33)
MCHC RBC AUTO-ENTMCNC: 31.3 G/DL (ref 31.5–35.7)
MCV RBC AUTO: 71.3 FL (ref 79–97)
MICROCYTES BLD QL: NORMAL
MONOCYTES # BLD AUTO: 0.75 10*3/MM3 (ref 0.1–0.9)
MONOCYTES NFR BLD AUTO: 3.7 % (ref 5–12)
NEUTROPHILS NFR BLD AUTO: 18.14 10*3/MM3 (ref 1.7–7)
NEUTROPHILS NFR BLD AUTO: 89.5 % (ref 42.7–76)
NRBC BLD AUTO-RTO: 0 /100 WBC (ref 0–0.2)
PLATELET # BLD AUTO: 289 10*3/MM3 (ref 140–450)
PMV BLD AUTO: 10.2 FL (ref 6–12)
POIKILOCYTOSIS BLD QL SMEAR: NORMAL
POLYCHROMASIA BLD QL SMEAR: NORMAL
POTASSIUM SERPL-SCNC: 5.1 MMOL/L (ref 3.5–5.2)
PROT SERPL-MCNC: 7.9 G/DL (ref 6–8.5)
PROTHROMBIN TIME: 15 SECONDS (ref 12.3–15.1)
RBC # BLD AUTO: 5.15 10*6/MM3 (ref 4.14–5.8)
SMALL PLATELETS BLD QL SMEAR: ADEQUATE
SODIUM SERPL-SCNC: 132 MMOL/L (ref 136–145)
UFH PPP CHRO-ACNC: 0.44 IU/ML
WBC MORPH BLD: NORMAL
WBC NRBC COR # BLD AUTO: 20.29 10*3/MM3 (ref 3.4–10.8)

## 2024-03-04 PROCEDURE — 85007 BL SMEAR W/DIFF WBC COUNT: CPT | Performed by: INTERNAL MEDICINE

## 2024-03-04 PROCEDURE — 25010000002 METHYLPREDNISOLONE PER 40 MG: Performed by: INTERNAL MEDICINE

## 2024-03-04 PROCEDURE — 25010000002 ENOXAPARIN PER 10 MG: Performed by: INTERNAL MEDICINE

## 2024-03-04 PROCEDURE — 93970 EXTREMITY STUDY: CPT

## 2024-03-04 PROCEDURE — 94761 N-INVAS EAR/PLS OXIMETRY MLT: CPT

## 2024-03-04 PROCEDURE — 99232 SBSQ HOSP IP/OBS MODERATE 35: CPT | Performed by: NURSE PRACTITIONER

## 2024-03-04 PROCEDURE — 94664 DEMO&/EVAL PT USE INHALER: CPT

## 2024-03-04 PROCEDURE — 80053 COMPREHEN METABOLIC PANEL: CPT | Performed by: INTERNAL MEDICINE

## 2024-03-04 PROCEDURE — 94799 UNLISTED PULMONARY SVC/PX: CPT

## 2024-03-04 PROCEDURE — 25010000002 CEFTRIAXONE SODIUM-DEXTROSE 2-2.22 GM-%(50ML) RECONSTITUTED SOLUTION: Performed by: INTERNAL MEDICINE

## 2024-03-04 PROCEDURE — 85610 PROTHROMBIN TIME: CPT | Performed by: INTERNAL MEDICINE

## 2024-03-04 PROCEDURE — 85520 HEPARIN ASSAY: CPT | Performed by: INTERNAL MEDICINE

## 2024-03-04 PROCEDURE — 85025 COMPLETE CBC W/AUTO DIFF WBC: CPT | Performed by: INTERNAL MEDICINE

## 2024-03-04 RX ORDER — ENOXAPARIN SODIUM 100 MG/ML
150 INJECTION SUBCUTANEOUS EVERY 12 HOURS
Status: DISCONTINUED | OUTPATIENT
Start: 2024-03-04 | End: 2024-03-05 | Stop reason: HOSPADM

## 2024-03-04 RX ORDER — METHYLPREDNISOLONE SODIUM SUCCINATE 40 MG/ML
40 INJECTION, POWDER, LYOPHILIZED, FOR SOLUTION INTRAMUSCULAR; INTRAVENOUS EVERY 12 HOURS
Status: DISCONTINUED | OUTPATIENT
Start: 2024-03-05 | End: 2024-03-05

## 2024-03-04 RX ADMIN — HYDROCHLOROTHIAZIDE 12.5 MG: 12.5 CAPSULE ORAL at 08:54

## 2024-03-04 RX ADMIN — METHYLPREDNISOLONE SODIUM SUCCINATE 40 MG: 40 INJECTION, POWDER, FOR SOLUTION INTRAMUSCULAR; INTRAVENOUS at 15:09

## 2024-03-04 RX ADMIN — TRIAMCINOLONE ACETONIDE: 1 CREAM TOPICAL at 08:54

## 2024-03-04 RX ADMIN — CARVEDILOL 25 MG: 25 TABLET, FILM COATED ORAL at 17:04

## 2024-03-04 RX ADMIN — IPRATROPIUM BROMIDE AND ALBUTEROL SULFATE 3 ML: .5; 3 SOLUTION RESPIRATORY (INHALATION) at 12:43

## 2024-03-04 RX ADMIN — Medication 10 ML: at 08:55

## 2024-03-04 RX ADMIN — AMLODIPINE BESYLATE 5 MG: 5 TABLET ORAL at 08:54

## 2024-03-04 RX ADMIN — CEFTRIAXONE 2000 MG: 2 INJECTION, SOLUTION INTRAVENOUS at 04:47

## 2024-03-04 RX ADMIN — Medication 10 ML: at 21:25

## 2024-03-04 RX ADMIN — METHYLPREDNISOLONE SODIUM SUCCINATE 40 MG: 40 INJECTION, POWDER, FOR SOLUTION INTRAMUSCULAR; INTRAVENOUS at 08:54

## 2024-03-04 RX ADMIN — Medication 10 ML: at 00:28

## 2024-03-04 RX ADMIN — ENOXAPARIN SODIUM 140 MG: 100 INJECTION SUBCUTANEOUS at 04:32

## 2024-03-04 RX ADMIN — METHYLPREDNISOLONE SODIUM SUCCINATE 40 MG: 40 INJECTION, POWDER, FOR SOLUTION INTRAMUSCULAR; INTRAVENOUS at 02:38

## 2024-03-04 RX ADMIN — TRIAMCINOLONE ACETONIDE 1 APPLICATION: 1 CREAM TOPICAL at 21:25

## 2024-03-04 RX ADMIN — LISINOPRIL 20 MG: 20 TABLET ORAL at 08:54

## 2024-03-04 RX ADMIN — IPRATROPIUM BROMIDE AND ALBUTEROL SULFATE 3 ML: .5; 3 SOLUTION RESPIRATORY (INHALATION) at 06:46

## 2024-03-04 RX ADMIN — IPRATROPIUM BROMIDE AND ALBUTEROL SULFATE 3 ML: .5; 3 SOLUTION RESPIRATORY (INHALATION) at 19:43

## 2024-03-04 RX ADMIN — BUDESONIDE 1 MG: 0.5 INHALANT RESPIRATORY (INHALATION) at 19:43

## 2024-03-04 RX ADMIN — ENOXAPARIN SODIUM 150 MG: 100 INJECTION SUBCUTANEOUS at 15:56

## 2024-03-04 RX ADMIN — Medication 10 ML: at 05:25

## 2024-03-04 RX ADMIN — BUDESONIDE 1 MG: 0.5 INHALANT RESPIRATORY (INHALATION) at 06:46

## 2024-03-04 RX ADMIN — Medication 10 ML: at 15:09

## 2024-03-04 RX ADMIN — CARVEDILOL 25 MG: 25 TABLET, FILM COATED ORAL at 08:54

## 2024-03-04 RX ADMIN — WARFARIN SODIUM 7.5 MG: 5 TABLET ORAL at 17:05

## 2024-03-04 RX ADMIN — MELATONIN TAB 5 MG 5 MG: 5 TAB at 00:31

## 2024-03-04 RX ADMIN — MELATONIN TAB 5 MG 5 MG: 5 TAB at 21:25

## 2024-03-04 NOTE — PROGRESS NOTES
"Pharmacy Consult - Enoxaparin Dosing  Jesús Mijares is a 28 y.o. male who has been receiving Enoxaparin for pulmonary embolism.     Allergies  Cherry flavor and Lorabid [loracarbef]    Relevant clinical data and objective history reviewed:   [Ht: 185.4 cm (73\"); Wt: (!) 202 kg (445 lb)]  Body mass index is 58.71 kg/m².  Estimated Creatinine Clearance: 247.7 mL/min (by C-G formula based on SCr of 0.81 mg/dL).  Results from last 7 days   Lab Units 03/04/24  0411 03/03/24  0701 03/02/24  2251   HEMOGLOBIN g/dL 11.5* 11.3* 11.6*   HEMATOCRIT % 36.7* 36.2* 37.0*   PLATELETS 10*3/mm3 289 246 249   CREATININE mg/dL 0.81 0.97 0.91      Latest Reference Range & Units Most Recent   Heparin Anti-Xa IU/ml 0.44  3/4/24 08:41       Asessment/Plan  Per system protocol, increase Enoxaparin to 150 mg SQ every 12 hours (increase of ~10%)  Pharmacy will monitor Mr. Mijares's renal function and clinical status and adjust the enoxaparin dose and/or frequency as needed.    Thank you,     Braydon Bennett, PharmD, BCPS   3/4/2024  14:23 EST  "

## 2024-03-04 NOTE — PROGRESS NOTES
[facility]    Name:  Jesús Mijares   Age:  28 y.o.  Sex:  male  :  1995  MRN:  [id number]   Visit Number:  [id number]  Admission Date:  3/2/2024  Date Of Service:  24  Primary Care Physician:  Chantal Gutierrez APRN     LOS: 1 day :    Chief Complaint:      Shortness of air    Subjective:    Patient seen and examined today.  No family at bedside.  States feeling some better.  Able to wean off of oxygen with no hypoxia noted.    Hospital Course:    Mr. Mijares is a pleasant 28-year-old male that presented to the emergency department due to shortness of air with pertinent past medical history of asthma, hypertension, morbid obesity, and cor pulmonale.  Patient began having shortness of air on  while he was working at [employer].  Patient presented to PCP and he was noted to have RSV.  He was initiated on doxycycline with continued dyspnea and hypoxia noted prompting patient to return to the emergency department.  Upon ED presentation patient requiring 2 L nasal cannula and tachycardic with heart rate 116.  Pertinent labs and imaging included highly sensitive troponin 21, proBNP 181.4, sodium 134, D-dimer 3.98, WBC 13.48, hemoglobin 11.6, MCV 71.4, chest x-ray showing cardiomegaly without failure, EKG showing sinus tachycardia without acute ST or T wave changes, and CT noting large burden of bilateral acute pulmonary embolus with right heart strain as well as right upper lobe and left lower lobe patchy lung opacities consider multifocal pneumonia.  Procalcitonin remained negative.  Cardiology consulted Dr. Gee.  Given patient habitus he was initiated on Coumadin with therapeutic Lovenox as well.  Unfortunately echo was not able to visualize right-sided structures due to body habitus.  Troponin and proBNP remain negative.  Discussed with patient echoes versus mechanical thrombectomy versus oral anticoagulation.  At this time patient agreeable with  oral anticoagulation.  Per cardiology will need to continue for 3 to 6 months with outpatient follow-up with hematology for thrombophilia workup.  PE likely precipitated due to sedentary state.  Fortunately, patient able to be weaned off of oxygen at rest.    Review of Systems:     All systems were reviewed and negative except as mentioned in subjective, assessment and plan.    Vital Signs:    Temp:  [97 °F (36.1 °C)-98.7 °F (37.1 °C)] 98.5 °F (36.9 °C)  Heart Rate:  [] 91  Resp:  [22-28] 22  BP: (118-155)/(59-99) 127/71    Intake and output:    I/O last 3 completed shifts:  In: 528 [P.O.:468; I.V.:10; IV Piggyback:50]  Out: 1800 [Urine:1800]  I/O this shift:  In: 840 [P.O.:840]  Out: -     Physical Examination:    General Appearance:  Alert and cooperative.  Morbidly obese middle-age male.   Head:  Atraumatic and normocephalic.   Eyes: Conjunctivae and sclerae normal, no icterus. No pallor.   Throat: No oral lesions, no thrush, oral mucosa moist.   Neck: Supple, trachea midline, no thyromegaly.   Lungs:   Breath sounds heard bilaterally equally.  No wheezing or crackles. No Pleural rub or bronchial breathing.  Unlabored on room air.   Heart:  Normal S1 and S2, no murmur, no gallop, no rub. No JVD.   Abdomen:   Normal bowel sounds, no masses, no organomegaly. Soft, nontender, nondistended, no rebound tenderness.   Extremities: Supple, no edema, no cyanosis, no clubbing.   Skin: No bleeding or rash.   Neurologic: Alert and oriented x 3. No facial asymmetry. Moves all four limbs. No tremors.  Generalized weakness.     Laboratory results:    Results from last 7 days   Lab Units 03/04/24  0411 03/03/24  0701 03/02/24  2251   SODIUM mmol/L 132* 132* 134*   POTASSIUM mmol/L 5.1 4.6 4.4   CHLORIDE mmol/L 98 98 99   CO2 mmol/L 21.2* 20.2* 23.7   BUN mg/dL 21* 16 12   CREATININE mg/dL 0.81 0.97 0.91   CALCIUM mg/dL 9.3 9.2 9.2   BILIRUBIN mg/dL 0.4 0.5 0.5   ALK PHOS U/L 94 101 96   ALT (SGPT) U/L 18 19 20   AST (SGOT)  "U/L 18 15 16   GLUCOSE mg/dL 157* 171* 99     Results from last 7 days   Lab Units 03/04/24  0411 03/03/24  0701 03/02/24  2251   WBC 10*3/mm3 20.29* 13.68* 13.48*   HEMOGLOBIN g/dL 11.5* 11.3* 11.6*   HEMATOCRIT % 36.7* 36.2* 37.0*   PLATELETS 10*3/mm3 289 246 249     Results from last 7 days   Lab Units 03/04/24  0411 03/03/24  1320   INR  1.12* 1.08     Results from last 7 days   Lab Units 03/02/24  2251   HSTROP T ng/L 21         No results for input(s): \"PHART\", \"OXH9MNO\", \"PO2ART\", \"UNY9ARV\", \"BASEEXCESS\" in the last 8760 hours.   I have reviewed the patient's laboratory results.    Radiology results:    Adult Transthoracic Echo Complete W/ Cont if Necessary Per Protocol    Result Date: 3/3/2024    Left ventricular ejection fraction appears to be 61 - 65%.   Left ventricular diastolic function was indeterminate.   The left atrial cavity is dilated.   No significant valvular abnormalities noted.   Insufficient TR jet to determine RVSP.   Due to body habitus intracardiac structures were poorly visualized despite the use of contrast.     CT Angiogram Chest Pulmonary Embolism    Addendum Date: 3/3/2024    ADDENDUM REPORT ADDENDUM: This report was discussed with Dr. Sousa on Mar 03, 2024 04:12:00 EST. Authenticated and Electronically Signed by Kia Overton MD on 03/03/2024 04:13:02 AM    Result Date: 3/3/2024  FINAL REPORT TECHNIQUE: null CLINICAL HISTORY: Pulmonary embolism (PE) suspected; SOA COMPARISON: null FINDINGS: CT angiography chest with contrast. 3D Postprocessing. Comparison: None Findings: Heart size is approaching the upper limits in size. RV/LV ratio of 1.2. Unremarkable thoracic aorta and great vessels. No aneurysm or dissection. The visualized thyroid and mediastinum are unremarkable. Symmetric gynecomastia. There are multiple acute segmental and subsegmental pulmonary emboli throughout both lower lobes, middle lobe, and right upper lobe. No definite emboli within the left upper lobe. Patchy " consolidation left lower lobe mostly medially. A few small patchy opacities also seen in the right upper lobe near the hilum. Probable fatty liver. The liver is also likely enlarged although incompletely included on the exam. Splenomegaly. The bones are intact.     Impression: IMPRESSION: 1. Large burden of bilateral acute pulmonary embolus. Right heart strain. 2. Right upper lobe and left lower lobe patchy lung opacities. Consider multifocal pneumonia. 3. Additional findings as above. Authenticated and Electronically Signed by Kia Overton MD on 03/03/2024 04:05:32 AM    XR Chest 1 View    Result Date: 3/3/2024  FINAL REPORT TECHNIQUE: null CLINICAL HISTORY: SOA, weakness, RSV 1 week ago COMPARISON: null FINDINGS: 1 view chest Comparison: None Findings: No consolidation or effusion. No acute fracture.     Impression: Impression: 1. Cardiomegaly without failure. Authenticated and Electronically Signed by Kia Overton MD on 03/03/2024 01:58:21 AM   I have reviewed the patient's radiology reports.    Medication Review:     I have reviewed the patient's active and prn medications.     Problem List:      Hypoxemia    Morbid obesity    Cor pulmonale (chronic)    Acute bronchitis    Positive D dimer    Microcytic anemia    Asthma      Assessment:    Bilateral pulmonary embolism, POA  Acute RSV, POA  Acute respiratory failure with hypoxia likely secondary to above, POA  History of asthma with likely exacerbation secondary to above, POA  Cor pulmonale  Morbid obesity  Likely sleep apnea    Plan:    -Continue supportive care with Pulmicort/DuoNeb/Solu-Medrol.  -Will taper methylprednisone to every 12 hours.  -Continue Coumadin with therapeutic Lovenox with daily INR with pharmacy dosing.  -Bilateral venous duplex pending.  -Therapeutic lifestyle changes with weight loss encouraged.    DVT Prophylaxis: Coumadin/therapeutic Lovenox  Code Status: Full code  Diet: Cardiac  Discharge Plan: 2 to 3 days Home    Yenni  JACI Quinonez, APRN  03/04/24  15:48 EST    Dictated utilizing Dragon dictation.

## 2024-03-04 NOTE — PLAN OF CARE
Goal Outcome Evaluation:      Spoke with pt as he sat in a bedside recliner.  I had greeted him as he was walking with his nurse in the ICU, and he then asked for me to visit him.  When I asked him swhat he is looking forward to after he was charged he replied that he enjoys video games and watching TV.  When I told him that seeing their dogs was was the number one answer I get to that question, he provided more information about his dogs.  The conversation was light hearted, and I offered to visit him again while he is here.

## 2024-03-04 NOTE — PROGRESS NOTES
"Pharmacy Consult  -  Warfarin    Jesús Mijares is a  28 y.o. male , pharmacy consulted for warfarin dosing  Height - 185.4 cm (73\")  Weight - (!) 202 kg (445 lb)    Consulting Provider: - Dr. Florentino Gee  Indication: - acute pulmonary embolism  Goal INR: -  2-3  Home Regimen:  New start warfarin    Bridge Therapy: Yes   enoxaparin 0.7 mg/kg subq q 12 hrs    Drug-Drug Interactions with current regimen:  azithromycin -- may enhance the anticoagulant effect of warfarin (moderate severity)  Ceftriaxone  methylprednisolone    Warfarin Dosing During Admission:    Date  3/3 3/4          INR  1.08 1.12          Dose  7.5 mg 7.5 mg               Labs:  Results from last 7 days   Lab Units 03/04/24  0411 03/03/24  1320 03/03/24  0701 03/02/24  2251   INR  1.12* 1.08  --   --    HEMOGLOBIN g/dL 11.5*  --  11.3* 11.6*   HEMATOCRIT % 36.7*  --  36.2* 37.0*   PLATELETS 10*3/mm3 289  --  246 249     Results from last 7 days   Lab Units 03/04/24  0411 03/03/24  0701 03/02/24  2251   SODIUM mmol/L 132* 132* 134*   POTASSIUM mmol/L 5.1 4.6 4.4   CHLORIDE mmol/L 98 98 99   CO2 mmol/L 21.2* 20.2* 23.7   BUN mg/dL 21* 16 12   CREATININE mg/dL 0.81 0.97 0.91   CALCIUM mg/dL 9.3 9.2 9.2   BILIRUBIN mg/dL 0.4 0.5 0.5   ALK PHOS U/L 94 101 96   ALT (SGPT) U/L 18 19 20   AST (SGOT) U/L 18 15 16   GLUCOSE mg/dL 157* 171* 99       Current dietary intake:   Diet Order   Procedures    Diet: Regular/House; Fluid Consistency: Thin (IDDSI 0)       Assessment/Plan:     Warfarin being dosed for acute PE with INR goal 2.0 to 3.0.     Patient is newly started on warfarin.  Will continue warfarin 7.5 mg today.  Daily PT/INR ordered.  Monitor signs/symptoms of bleeding, dietary intake, and drug-drug interactions. Make dose adjustments as necessary.  Pharmacy will continue to follow.      Thank you for the opportunity to consult on this patient.    Braydon Bennett, PharmD, BCPS   03/04/24  08:29 EST  "

## 2024-03-04 NOTE — THERAPY DISCHARGE NOTE
PT order was received.  Patient has already walked with nursing more than 300 feet.  SAHCIN Mello reports the patient did well.  He does not require the skills of PT.  We will sign off at this time.

## 2024-03-04 NOTE — PLAN OF CARE
Goal Outcome Evaluation:              Outcome Evaluation: Pt HR has decreased to 90's O2@3.5L--humidity added.   Exhibits DO.  Labs drawn/pending.  Pt neuro WNL's.

## 2024-03-04 NOTE — CASE MANAGEMENT/SOCIAL WORK
"Discharge Planning Assessment  [facility]     Patient Name: Jesús Mijares  MRN: [id number]  Today's Date: 3/4/2024    Admit Date: 3/2/2024    Plan: DCP Home with family to transport. Denies any needs/services.   Discharge Needs Assessment       Row Name 03/04/24 1437       Living Environment    People in Home parent(s)    Name(s) of People in Home Elin/Mother   Efren/Father    Current Living Arrangements home    Duration at Residence \"Over a year.\"    Potentially Unsafe Housing Conditions unable to assess    In the past 12 months has the electric, gas, oil, or water company threatened to shut off services in your home? No    Primary Care Provided by self    Provides Primary Care For no one    Family Caregiver if Needed parent(s)    Family Caregiver Names Elin/Mother   Efren/Father    Quality of Family Relationships unable to assess    Able to Return to Prior Arrangements yes       Resource/Environmental Concerns    Resource/Environmental Concerns none    Transportation Concerns none       Transportation Needs    In the past 12 months, has lack of transportation kept you from medical appointments or from getting medications? no  Mother drives him to all apointments.    In the past 12 months, has lack of transportation kept you from meetings, work, or from getting things needed for daily living? No       Food Insecurity    Within the past 12 months, you worried that your food would run out before you got the money to buy more. Never true    Within the past 12 months, the food you bought just didn't last and you didn't have money to get more. Never true       Transition Planning    Patient/Family Anticipates Transition to home with family    Patient/Family Anticipated Services at Transition none    Transportation Anticipated family or friend will provide       Discharge Needs Assessment    Readmission Within the Last 30 Days no previous admission in last 30 days    Equipment Currently Used at Home none    Concerns to " "be Addressed denies needs/concerns at this time    Anticipated Changes Related to Illness none    Equipment Needed After Discharge other (see comments)  To be determined.    Provided Post Acute Provider List? N/A    Provided Post Acute Provider Quality & Resource List? N/A                   Discharge Plan       Row Name 24 1440       Plan    Plan DCP Home with family to transport. Denies any needs/services.    Plan Comments CM spoke with pt at bedside. Permission given to discuss DCP. Pt confirmed name,,address, and insurance. No LW,No POA and No  services. PCP confirmed as JARROD Chapin, Last seen 2024. Pharmacy used [facility] (P), Agreed to meds to bed. Regarding DME Pt states \"Has no equipment at home\" Lives with parents. Pt works full time at [employer] as a . Pt states (I) with all his ADL's and (I) with his mobility. Mother/Elin transports to all appointments. Pt does not drive.  DCP is Home with family. Denies any needs/services at this time.    Final Discharge Disposition Code 01 - home or self-care                  Continued Care and Services - Admitted Since 3/2/2024    No active coordination exists for this encounter.          Demographic Summary       Row Name 24 1433       General Information    Admission Type inpatient    Arrived From emergency department    Referral Source admission list    Reason for Consult discharge planning    Preferred Language English       Contact Information    Permission Granted to Share Info With     Contact Information Obtained for                    Functional Status       Row Name 24 1435       Functional Status    Usual Activity Tolerance fair    Current Activity Tolerance poor       Physical Activity    On average, how many days per week do you engage in moderate to strenuous exercise (like a brisk walk)? 3 days    On average, how many minutes do you engage in exercise at this level? 60 min    Number of " minutes of exercise per week 180       Assessment of Health Literacy    How often do you have someone help you read hospital materials? Sometimes    How often do you have problems learning about your medical condition because of difficulty understanding written information? Sometimes    How often do you have a problem understanding what is told to you about your medical condition? Sometimes    How confident are you filling out medical forms by yourself? Somewhat    Health Literacy Good       Functional Status, IADL    Medications independent    Meal Preparation independent    Housekeeping independent    Laundry independent    Shopping assistive person    IADL Comments Lives with parents, but does his own IADL's. Mother/Elin drives him to appointments/shopping.       Mental Status    General Appearance WDL WDL       Mental Status Summary    Recent Changes in Mental Status/Cognitive Functioning no changes       Employment/    Employment Status employed full-time    Shift Worked first shift    Current or Previous Occupation service industry    Employment/ Comments Full time /[employer] for [employer].                   Psychosocial       Row Name 03/04/24 1434       Developmental Stage (Eriksson's)    Developmental Stage Stage 6 (18-35 years/Young Adulthood) Intimacy vs. Isolation                   Abuse/Neglect    No documentation.                  Legal    No documentation.                  Substance Abuse    No documentation.                  Patient Forms    No documentation.                     Sarah Pino RN

## 2024-03-05 ENCOUNTER — READMISSION MANAGEMENT (OUTPATIENT)
Dept: CALL CENTER | Facility: HOSPITAL | Age: 29
End: 2024-03-05
Payer: COMMERCIAL

## 2024-03-05 VITALS
HEIGHT: 73 IN | TEMPERATURE: 97.2 F | DIASTOLIC BLOOD PRESSURE: 74 MMHG | OXYGEN SATURATION: 94 % | WEIGHT: 315 LBS | HEART RATE: 89 BPM | BODY MASS INDEX: 41.75 KG/M2 | RESPIRATION RATE: 21 BRPM | SYSTOLIC BLOOD PRESSURE: 126 MMHG

## 2024-03-05 LAB
ANION GAP SERPL CALCULATED.3IONS-SCNC: 14.4 MMOL/L (ref 5–15)
ANISOCYTOSIS BLD QL: NORMAL
BASOPHILS # BLD AUTO: 0.02 10*3/MM3 (ref 0–0.2)
BASOPHILS NFR BLD AUTO: 0.1 % (ref 0–1.5)
BUN SERPL-MCNC: 23 MG/DL (ref 6–20)
BUN/CREAT SERPL: 31.1 (ref 7–25)
CALCIUM SPEC-SCNC: 8.9 MG/DL (ref 8.6–10.5)
CHLORIDE SERPL-SCNC: 98 MMOL/L (ref 98–107)
CO2 SERPL-SCNC: 20.6 MMOL/L (ref 22–29)
CREAT SERPL-MCNC: 0.74 MG/DL (ref 0.76–1.27)
DEPRECATED RDW RBC AUTO: 44.3 FL (ref 37–54)
EGFRCR SERPLBLD CKD-EPI 2021: 126.6 ML/MIN/1.73
EOSINOPHIL # BLD AUTO: 0 10*3/MM3 (ref 0–0.4)
EOSINOPHIL NFR BLD AUTO: 0 % (ref 0.3–6.2)
ERYTHROCYTE [DISTWIDTH] IN BLOOD BY AUTOMATED COUNT: 18.3 % (ref 12.3–15.4)
GLUCOSE SERPL-MCNC: 140 MG/DL (ref 65–99)
HCT VFR BLD AUTO: 36.1 % (ref 37.5–51)
HGB BLD-MCNC: 11.1 G/DL (ref 13–17.7)
IMM GRANULOCYTES # BLD AUTO: 0.2 10*3/MM3 (ref 0–0.05)
IMM GRANULOCYTES NFR BLD AUTO: 0.9 % (ref 0–0.5)
INR PPP: 1.5 (ref 0.9–1.1)
LARGE PLATELETS: NORMAL
LYMPHOCYTES # BLD AUTO: 1.52 10*3/MM3 (ref 0.7–3.1)
LYMPHOCYTES NFR BLD AUTO: 6.7 % (ref 19.6–45.3)
MCH RBC QN AUTO: 21.9 PG (ref 26.6–33)
MCHC RBC AUTO-ENTMCNC: 30.7 G/DL (ref 31.5–35.7)
MCV RBC AUTO: 71.3 FL (ref 79–97)
MICROCYTES BLD QL: NORMAL
MONOCYTES # BLD AUTO: 1.16 10*3/MM3 (ref 0.1–0.9)
MONOCYTES NFR BLD AUTO: 5.1 % (ref 5–12)
NEUTROPHILS NFR BLD AUTO: 19.7 10*3/MM3 (ref 1.7–7)
NEUTROPHILS NFR BLD AUTO: 87.2 % (ref 42.7–76)
NRBC BLD AUTO-RTO: 0 /100 WBC (ref 0–0.2)
PLATELET # BLD AUTO: 287 10*3/MM3 (ref 140–450)
PMV BLD AUTO: 9.9 FL (ref 6–12)
POTASSIUM SERPL-SCNC: 4.5 MMOL/L (ref 3.5–5.2)
PROTHROMBIN TIME: 18.7 SECONDS (ref 12.3–15.1)
RBC # BLD AUTO: 5.06 10*6/MM3 (ref 4.14–5.8)
SMALL PLATELETS BLD QL SMEAR: ADEQUATE
SODIUM SERPL-SCNC: 133 MMOL/L (ref 136–145)
WBC MORPH BLD: NORMAL
WBC NRBC COR # BLD AUTO: 22.6 10*3/MM3 (ref 3.4–10.8)

## 2024-03-05 PROCEDURE — 94618 PULMONARY STRESS TESTING: CPT

## 2024-03-05 PROCEDURE — 85025 COMPLETE CBC W/AUTO DIFF WBC: CPT | Performed by: NURSE PRACTITIONER

## 2024-03-05 PROCEDURE — 94761 N-INVAS EAR/PLS OXIMETRY MLT: CPT

## 2024-03-05 PROCEDURE — 94664 DEMO&/EVAL PT USE INHALER: CPT

## 2024-03-05 PROCEDURE — 85610 PROTHROMBIN TIME: CPT | Performed by: INTERNAL MEDICINE

## 2024-03-05 PROCEDURE — 63710000001 PREDNISONE PER 1 MG: Performed by: NURSE PRACTITIONER

## 2024-03-05 PROCEDURE — 25010000002 METHYLPREDNISOLONE PER 40 MG: Performed by: NURSE PRACTITIONER

## 2024-03-05 PROCEDURE — 94799 UNLISTED PULMONARY SVC/PX: CPT

## 2024-03-05 PROCEDURE — 80048 BASIC METABOLIC PNL TOTAL CA: CPT | Performed by: NURSE PRACTITIONER

## 2024-03-05 PROCEDURE — 85007 BL SMEAR W/DIFF WBC COUNT: CPT | Performed by: NURSE PRACTITIONER

## 2024-03-05 PROCEDURE — 99239 HOSP IP/OBS DSCHRG MGMT >30: CPT | Performed by: NURSE PRACTITIONER

## 2024-03-05 PROCEDURE — 25010000002 ENOXAPARIN PER 10 MG: Performed by: INTERNAL MEDICINE

## 2024-03-05 RX ORDER — PREDNISONE 20 MG/1
40 TABLET ORAL
Qty: 8 TABLET | Refills: 0 | Status: SHIPPED | OUTPATIENT
Start: 2024-03-06 | End: 2024-03-10

## 2024-03-05 RX ORDER — ALBUTEROL SULFATE 90 UG/1
2 AEROSOL, METERED RESPIRATORY (INHALATION) EVERY 4 HOURS PRN
Qty: 18 G | Refills: 0 | Status: SHIPPED | OUTPATIENT
Start: 2024-03-05

## 2024-03-05 RX ORDER — ENOXAPARIN SODIUM 150 MG/ML
150 INJECTION SUBCUTANEOUS EVERY 12 HOURS
Qty: 10 ML | Refills: 0 | Status: SHIPPED | OUTPATIENT
Start: 2024-03-05 | End: 2024-03-10

## 2024-03-05 RX ORDER — WARFARIN SODIUM 4 MG/1
TABLET ORAL
Qty: 6 TABLET | Refills: 0 | Status: SHIPPED | OUTPATIENT
Start: 2024-03-05

## 2024-03-05 RX ORDER — PREDNISONE 20 MG/1
40 TABLET ORAL
Status: DISCONTINUED | OUTPATIENT
Start: 2024-03-05 | End: 2024-03-05 | Stop reason: HOSPADM

## 2024-03-05 RX ADMIN — FERROUS SULFATE TAB EC 324 MG (65 MG FE EQUIVALENT) 324 MG: 324 (65 FE) TABLET DELAYED RESPONSE at 09:10

## 2024-03-05 RX ADMIN — LISINOPRIL 20 MG: 20 TABLET ORAL at 09:10

## 2024-03-05 RX ADMIN — METHYLPREDNISOLONE SODIUM SUCCINATE 40 MG: 40 INJECTION, POWDER, FOR SOLUTION INTRAMUSCULAR; INTRAVENOUS at 03:42

## 2024-03-05 RX ADMIN — AMLODIPINE BESYLATE 5 MG: 5 TABLET ORAL at 09:10

## 2024-03-05 RX ADMIN — BUDESONIDE 1 MG: 0.5 INHALANT RESPIRATORY (INHALATION) at 06:56

## 2024-03-05 RX ADMIN — HYDROCHLOROTHIAZIDE 12.5 MG: 12.5 CAPSULE ORAL at 09:10

## 2024-03-05 RX ADMIN — CARVEDILOL 25 MG: 25 TABLET, FILM COATED ORAL at 09:10

## 2024-03-05 RX ADMIN — TRIAMCINOLONE ACETONIDE: 1 CREAM TOPICAL at 09:12

## 2024-03-05 RX ADMIN — ENOXAPARIN SODIUM 150 MG: 100 INJECTION SUBCUTANEOUS at 03:46

## 2024-03-05 RX ADMIN — Medication 10 ML: at 09:10

## 2024-03-05 RX ADMIN — PREDNISONE 40 MG: 20 TABLET ORAL at 09:34

## 2024-03-05 RX ADMIN — IPRATROPIUM BROMIDE AND ALBUTEROL SULFATE 3 ML: .5; 3 SOLUTION RESPIRATORY (INHALATION) at 06:56

## 2024-03-05 NOTE — DISCHARGE INSTRUCTIONS
Patient to be discharged home today.  Continue CPAP at night and oxygen with exertion.  Continue prednisone 40 mg tomorrow morning x 4 days.  Use inhaler for shortness of air as needed.  Continue Coumadin at 6.5 mg at night until follow-up with primary care doctor.  Continue Lovenox injections twice a day until follow-up with primary care provider.  Patient to have repeat INR in 2 days.  If worsening shortness of air or any other concerns return to emergency department.

## 2024-03-05 NOTE — DISCHARGE SUMMARY
[facility]      Name:  Jesús Mijares   Age:  28 y.o.  Sex:  male  :  1995  MRN:  [id number]   Visit Number:  [id number]    Admission Date:  3/2/2024  Date of Discharge:  3/5/2024  Primary Care Physician:  Chantal Gutierrez APRN    Important issues to note:    -Patient admitted for acute respiratory failure with hypoxia secondary to bilateral PEs noted.  Likely secondary to sedentary state with recent RSV and acute asthma exacerbation.  -Patient initiated on Coumadin and therapeutic Lovenox.  Will continue until seen by PCP in 2 days with repeat INR.  Prior INR 1.5.  -Per pharmacy recommendations patient to continue Lovenox with Coumadin until INR greater than 2 for 2 consecutive readings.  -To continue prednisone 40 mg x 4 days.  -Venous Dopplers negative.  -Strict return precautions given to patient and mother on phone with plan of care updated and all questions answered.  -He did require 2 L nasal cannula with exertion.  Otherwise reassuring labs and vitals noted.    Discharge Diagnoses:     Bilateral pulmonary embolism, POA  Acute RSV, POA  Acute respiratory failure with hypoxia likely secondary to above, POA  History of asthma with likely exacerbation secondary to above, POA  Cor pulmonale  Morbid obesity  Obstructive sleep apnea with CPAP    Problem List:     Active Hospital Problems    Diagnosis  POA    **Hypoxemia [R09.02]  Yes    Acute bronchitis [J20.9]  Yes    Positive D dimer [R79.89]  Yes    Microcytic anemia [D50.9]  Yes    Asthma [J45.909]  Yes    Cor pulmonale (chronic) [I27.81]  Yes    Morbid obesity [E66.01]  Yes      Resolved Hospital Problems   No resolved problems to display.     Presenting Problem:    Chief Complaint   Patient presents with    Shortness of Breath      Consults:     Consulting Physician(s)         Provider   Role Specialty     Ludwig Petty MD      Consulting Physician Cardiology     Yosi Stephen MD       Consulting Physician Cardiology     Fernando Schmid DO      Consulting Physician Hospitalist          Procedures Performed:        History of presenting illness/Hospital Course:    Mr. Mijares is a pleasant 28-year-old male that presented to the emergency department due to shortness of air with pertinent past medical history of asthma, hypertension, morbid obesity, and cor pulmonale.  Patient began having shortness of air on February 28 while he was working at [employer].  Patient presented to PCP and he was noted to have RSV.  He was initiated on doxycycline with continued dyspnea and hypoxia noted prompting patient to return to the emergency department.    Upon ED presentation patient requiring 2 L nasal cannula and tachycardic with heart rate 116.  Pertinent labs and imaging included highly sensitive troponin 21, proBNP 181.4, sodium 134, D-dimer 3.98, WBC 13.48, hemoglobin 11.6, MCV 71.4, chest x-ray showing cardiomegaly without failure, EKG showing sinus tachycardia without acute ST or T wave changes, and CT noting large burden of bilateral acute pulmonary embolus with right heart strain as well as right upper lobe and left lower lobe patchy lung opacities consider multifocal pneumonia.  Procalcitonin remained negative.  Cardiology consulted Dr. Gee.  Given patient habitus he was initiated on Coumadin with therapeutic Lovenox as well.  Unfortunately echo was not able to visualize right-sided structures due to body habitus.  Troponin and proBNP remain negative.  Discussed with patient echoes versus mechanical thrombectomy versus oral anticoagulation.  At this time patient agreeable with oral anticoagulation.  Per cardiology will need to continue for 3 to 6 months with outpatient follow-up with hematology for thrombophilia workup.  PE likely precipitated due to sedentary state.  Fortunately, patient able to be weaned off of oxygen at rest.  He did require oxygen 2 L with exertion.  Otherwise reassuring labs and  vitals noted.  He will be discharged on therapeutic Lovenox bridge  to follow-up closely with PCP for repeat INR in 2 days. Patient will also continue Coumadin 6.5 mg daily until seen by PCP.  INR upon discharge 1.5.  Patient to continue prednisone 40 mg x 4 days due to acute bronchitis with RSV and underlying asthma.  He does have CPAP at home for which utilization was encouraged.  He does follow with Dr. Bond.  Would also consider referral to hematology for thrombophilia workup.  Strict return precautions given to patient and mother.    Vital Signs:    Temp:  [97.4 °F (36.3 °C)-98.5 °F (36.9 °C)] 97.4 °F (36.3 °C)  Heart Rate:  [] 84  Resp:  [18-25] 22  BP: (119-138)/(68-83) 138/81    Physical Exam:    General Appearance:  Alert and cooperative.  Middle-age male.  No acute distress.  BMI 58.   Head:  Atraumatic and normocephalic.   Eyes: Conjunctivae and sclerae normal, no icterus. No pallor.   Ears:  Ears with no abnormalities noted.   Throat: No oral lesions, no thrush, oral mucosa moist.   Neck: Supple, trachea midline, no thyromegaly.   Back:   No kyphoscoliosis present. No tenderness to palpation.   Lungs:   Breath sounds heard bilaterally equally.  No crackles or wheezing. No Pleural rub or bronchial breathing.  On room air unlabored.   Heart:  Normal S1 and S2, no murmur, no gallop, no rub. No JVD.   Abdomen:   Normal bowel sounds, no masses, no organomegaly. Soft, nontender, nondistended, no rebound tenderness.   Extremities: Supple, no edema, no cyanosis, no clubbing.   Pulses: Pulses palpable bilaterally.   Skin: No bleeding or rash.   Neurologic: Alert and oriented x 3. No facial asymmetry. Moves all four limbs. No tremors.  Generalized weakness.     Pertinent Lab Results:     Results from last 7 days   Lab Units 03/05/24  0419 03/04/24  0411 03/03/24  0701 03/02/24  2251   SODIUM mmol/L 133* 132* 132* 134*   POTASSIUM mmol/L 4.5 5.1 4.6 4.4   CHLORIDE mmol/L 98 98 98 99   CO2 mmol/L 20.6* 21.2*  20.2* 23.7   BUN mg/dL 23* 21* 16 12   CREATININE mg/dL 0.74* 0.81 0.97 0.91   CALCIUM mg/dL 8.9 9.3 9.2 9.2   BILIRUBIN mg/dL  --  0.4 0.5 0.5   ALK PHOS U/L  --  94 101 96   ALT (SGPT) U/L  --  18 19 20   AST (SGOT) U/L  --  18 15 16   GLUCOSE mg/dL 140* 157* 171* 99     Results from last 7 days   Lab Units 03/05/24  0419 03/04/24  0411 03/03/24  0701   WBC 10*3/mm3 22.60* 20.29* 13.68*   HEMOGLOBIN g/dL 11.1* 11.5* 11.3*   HEMATOCRIT % 36.1* 36.7* 36.2*   PLATELETS 10*3/mm3 287 289 246     Results from last 7 days   Lab Units 03/05/24  0419 03/04/24  0411 03/03/24  1320   INR  1.50* 1.12* 1.08     Results from last 7 days   Lab Units 03/02/24  2251   HSTROP T ng/L 21     Results from last 7 days   Lab Units 03/02/24  2251   PROBNP pg/mL 181.4                       Pertinent Radiology Results:    Imaging Results (All)       Procedure Component Value Units Date/Time    US Venous Doppler Lower Extremity Bilateral (duplex) [[id number]] Collected: 03/04/24 2035     Updated: 03/04/24 2051    Narrative:      FINAL REPORT    TECHNIQUE:  Multiple transverse and longitudinal images were performed of  the femoral-popliteal deep venous system with augmentation and  compression maneuvers.    CLINICAL HISTORY:  PE    FINDINGS:  Exam quality is suboptimal due to the patient's large size, but  bilateral lower extremity duplex ultrasound demonstrates normal  flow in the visualized deep venous system. There is no abnormal  echogenicity to suggest thrombus. There is normal compression  and augmentation.      Impression:      Suboptimal evaluation, but no DVT identified.    Authenticated and Electronically Signed by Delbert Rowell M.D. on  03/04/2024 08:50:07 PM    CT Angiogram Chest Pulmonary Embolism [[id number]] Collected: 03/03/24 0405     Updated: 03/03/24 0414    Addenda:        ADDENDUM REPORT    ADDENDUM:  This report was discussed with Dr. Sousa on Mar 03, 2024 04:12:00 EST.    Authenticated and Electronically Signed by Kia  MD Brooklynn  on 03/03/2024 04:13:02 AM  Signed: 03/03/24 0413 by Kia Overton MD    Narrative:      FINAL REPORT    TECHNIQUE:  null    CLINICAL HISTORY:  Pulmonary embolism (PE) suspected; SOA    COMPARISON:  null    FINDINGS:  CT angiography chest with contrast. 3D Postprocessing.    Comparison: None    Findings:    Heart size is approaching the upper limits in size. RV/LV ratio of 1.2.    Unremarkable thoracic aorta and great vessels. No aneurysm or dissection.    The visualized thyroid and mediastinum are unremarkable.    Symmetric gynecomastia.    There are multiple acute segmental and subsegmental pulmonary emboli throughout both lower lobes, middle lobe, and right upper lobe. No definite emboli within the left upper lobe.    Patchy consolidation left lower lobe mostly medially. A few small patchy opacities also seen in the right upper lobe near the hilum.    Probable fatty liver. The liver is also likely enlarged although incompletely included on the exam.    Splenomegaly.    The bones are intact.      Impression:      IMPRESSION:    1. Large burden of bilateral acute pulmonary embolus. Right heart strain.    2. Right upper lobe and left lower lobe patchy lung opacities. Consider multifocal pneumonia.    3. Additional findings as above.    Authenticated and Electronically Signed by Kia Overton MD  on 03/03/2024 04:05:32 AM    XR Chest 1 View [[id number]] Collected: 03/03/24 0158     Updated: 03/03/24 0200    Narrative:      FINAL REPORT    TECHNIQUE:  null    CLINICAL HISTORY:  SOA, weakness, RSV 1 week ago    COMPARISON:  null    FINDINGS:  1 view chest    Comparison: None    Findings:    No consolidation or effusion.    No acute fracture.      Impression:      Impression:    1. Cardiomegaly without failure.    Authenticated and Electronically Signed by Kia Overton MD  on 03/03/2024 01:58:21 AM            Echo:    Results for orders placed during the hospital encounter of  03/02/24    Adult Transthoracic Echo Complete W/ Cont if Necessary Per Protocol    Interpretation Summary    Left ventricular ejection fraction appears to be 61 - 65%.    Left ventricular diastolic function was indeterminate.    The left atrial cavity is dilated.    No significant valvular abnormalities noted.    Insufficient TR jet to determine RVSP.    Due to body habitus intracardiac structures were poorly visualized despite the use of contrast.    Condition on Discharge:      Stable.    Code status during the hospital stay:    Code Status and Medical Interventions:   Ordered at: 03/03/24 0238     Code Status (Patient has no pulse and is not breathing):    CPR (Attempt to Resuscitate)     Medical Interventions (Patient has pulse or is breathing):    Full Support     Discharge Disposition:    Home or Self Care    Discharge Medications:       Discharge Medications        New Medications        Instructions Start Date   albuterol sulfate  (90 Base) MCG/ACT inhaler  Commonly known as: PROVENTIL HFA;VENTOLIN HFA;PROAIR HFA   2 puffs, Inhalation, Every 4 Hours PRN      Enoxaparin Sodium 80 MG/0.8ML solution prefilled syringe syringe  Commonly known as: LOVENOX   150 mg, Subcutaneous, Every 12 Hours      predniSONE 20 MG tablet  Commonly known as: DELTASONE   40 mg, Oral, Daily With Breakfast   Start Date: March 6, 2024     warfarin 1 MG tablet  Commonly known as: Coumadin   Take 6.5 mg in the evening until follow up with PCP             Continue These Medications        Instructions Start Date   amLODIPine 5 MG tablet  Commonly known as: NORVASC   5 mg, Oral, Daily      carvedilol 25 MG tablet  Commonly known as: COREG   25 mg, Oral, 2 Times Daily With Meals      lisinopril-hydrochlorothiazide 20-12.5 MG per tablet  Commonly known as: PRINZIDE,ZESTORETIC   1 tablet, Oral, Daily, 200001      triamcinolone 0.025 % cream  Commonly known as: KENALOG   APPLY TOPICALLY TO THE LEFT THIGH TWICE DAILY              Discharge Diet:     Diet Instructions       Diet: Cardiac Diets; Healthy Heart (2-3 Na+); Regular (IDDSI 7); Thin (IDDSI 0)      Discharge Diet: Cardiac Diets    Cardiac Diet: Healthy Heart (2-3 Na+)    Texture: Regular (IDDSI 7)    Fluid Consistency: Thin (IDDSI 0)          Activity at Discharge:     Activity Instructions       Activity as Tolerated            Follow-up Appointments:     Follow-up Information       Chantal Gutierrez APRN. Go on 3/7/2024.    Specialty: Family Medicine  Why: If symptoms of shortness of air worsens or chest pain occurs before appointment, call 911 or go to the Emergency Room.     Otherwise, Go to appointment March 7, 2024 at 10 AM  Contact information:  [address]  [facility] [address] [phone]                           Future Appointments   Date Time Provider Department Center   3/14/2024  2:00 PM aLuren Wellington APRN [facility]     Test Results Pending at Discharge:           IRIS Reed  03/05/24  11:20 EST    Time: I spent 45 minutes on this discharge activity which included: face-to-face encounter with the patient, reviewing the data in the system, coordination of the care with the nursing staff as well as consultants, documentation, and entering orders.     Dictated utilizing Dragon dictation.

## 2024-03-05 NOTE — CASE MANAGEMENT/SOCIAL WORK
Case Management Discharge Note      Final Note: DC home with family to transport. Henry brought portable 02 for transport.    Provided Post Acute Provider List?: N/A  Provided Post Acute Provider Quality & Resource List?: N/A    Selected Continued Care - Discharged on 3/5/2024 Admission date: 3/2/2024 - Discharge disposition: Home or Self Care      Destination    No services have been selected for the patient.                Durable Medical Equipment    No services have been selected for the patient.                Dialysis/Infusion    No services have been selected for the patient.                Home Medical Care    No services have been selected for the patient.                Therapy    No services have been selected for the patient.                Community Resources    No services have been selected for the patient.                Community & DME    No services have been selected for the patient.                    Transportation Services  Private: Car    Final Discharge Disposition Code: 01 - home or self-care

## 2024-03-05 NOTE — PHARMACY RECOMMENDATION
"Pharmacy Consult  -  Warfarin    Jesús Mijares is a  28 y.o. male , pharmacy consulted for warfarin dosing  Height - 185.4 cm (73\")  Weight - (!) 202 kg (445 lb)    Consulting Provider: - Dr. Florentino Gee  Indication: - acute pulmonary embolism  Goal INR: -  2-3  Home Regimen:  New start warfarin    Bridge Therapy: Yes   enoxaparin 0.7 mg/kg subq q 12 hrs    Drug-Drug Interactions with current regimen:  azithromycin -- may enhance the anticoagulant effect of warfarin (moderate severity)  ceftriaxone -- may enhance the anticoagulant effect of warfarin (moderate severity)  methylprednisolone -- may enhance the anticoagulant effect of warfarin (moderate severity)    Warfarin Dosing During Admission:    Date  3/3 3/4 3/5         INR  1.08 1.12 1.50         Dose  7.5 mg 7.5 mg 6 mg              Labs:  Results from last 7 days   Lab Units 03/05/24 0419 03/04/24  0411 03/03/24  1320 03/03/24  0701 03/02/24  2251   INR  1.50* 1.12* 1.08  --   --    HEMOGLOBIN g/dL 11.1* 11.5*  --  11.3* 11.6*   HEMATOCRIT % 36.1* 36.7*  --  36.2* 37.0*   PLATELETS 10*3/mm3 287 289  --  246 249     Results from last 7 days   Lab Units 03/05/24 0419 03/04/24  0411 03/03/24  0701 03/02/24  2251   SODIUM mmol/L 133* 132* 132* 134*   POTASSIUM mmol/L 4.5 5.1 4.6 4.4   CHLORIDE mmol/L 98 98 98 99   CO2 mmol/L 20.6* 21.2* 20.2* 23.7   BUN mg/dL 23* 21* 16 12   CREATININE mg/dL 0.74* 0.81 0.97 0.91   CALCIUM mg/dL 8.9 9.3 9.2 9.2   BILIRUBIN mg/dL  --  0.4 0.5 0.5   ALK PHOS U/L  --  94 101 96   ALT (SGPT) U/L  --  18 19 20   AST (SGOT) U/L  --  18 15 16   GLUCOSE mg/dL 140* 157* 171* 99       Current dietary intake:   Diet Order   Procedures    Diet: Regular/House; Fluid Consistency: Thin (IDDSI 0)       Assessment/Plan:     Warfarin being dosed for acute PE with INR goal 2.0 to 3.0.     Patient is newly started on warfarin.  Patient's INR is subtherapeutic today. INR of 1.50 represents an increase by 0.38 when compared to yesterday.  Will " give warfarin 6 mg today.  Daily PT/INR ordered.  Monitor signs/symptoms of bleeding, dietary intake, and drug-drug interactions. Make dose adjustments as necessary.  Pharmacy will continue to follow.      Thank you for the opportunity to consult on this patient.    Gonzalo Mederos RPH, Pharm.D.  03/05/24  08:07 EST

## 2024-03-05 NOTE — PROGRESS NOTES
"Pharmacy Consult  -  Warfarin    Jesús Mijares is a  28 y.o. male , pharmacy consulted for warfarin dosing  Height - 185.4 cm (73\")  Weight - (!) 202 kg (445 lb)    Consulting Provider: - Dr. Florentino Gee   Indication: - acute pulmonary embolism  Goal INR: -  2-3  Home Regimen:  New start warfarin    Bridge Therapy: Yes   Enoxaparin 150 mg/kg SC BID    Drug-Drug Interactions with current regimen:  Prednisone may increase the anticoagulant effect of warfarin    Warfarin Dosing During Admission:     Date  3/3 3/4 3/5               INR  1.08 1.12 1.50               Dose  7.5 mg 7.5 mg 7.5 mg                   Labs:  Results from last 7 days   Lab Units 03/05/24  0419 03/04/24  0411 03/03/24  1320 03/03/24  0701 03/02/24  2251   INR  1.50* 1.12* 1.08  --   --    HEMOGLOBIN g/dL 11.1* 11.5*  --  11.3* 11.6*   HEMATOCRIT % 36.1* 36.7*  --  36.2* 37.0*   PLATELETS 10*3/mm3 287 289  --  246 249     Results from last 7 days   Lab Units 03/05/24  0419 03/04/24  0411 03/03/24  0701 03/02/24  2251   SODIUM mmol/L 133* 132* 132* 134*   POTASSIUM mmol/L 4.5 5.1 4.6 4.4   CHLORIDE mmol/L 98 98 98 99   CO2 mmol/L 20.6* 21.2* 20.2* 23.7   BUN mg/dL 23* 21* 16 12   CREATININE mg/dL 0.74* 0.81 0.97 0.91   CALCIUM mg/dL 8.9 9.3 9.2 9.2   BILIRUBIN mg/dL  --  0.4 0.5 0.5   ALK PHOS U/L  --  94 101 96   ALT (SGPT) U/L  --  18 19 20   AST (SGOT) U/L  --  18 15 16   GLUCOSE mg/dL 140* 157* 171* 99     Current dietary intake:   Diet Order   Procedures    Diet: Regular/House; Fluid Consistency: Thin (IDDSI 0)        Assessment/Plan:     Warfarin being dosed for pulmonary embolism with INR goal 2 to 3.     Patient's INR is subtherapeutic today. Represents an increase in INR when compared to yesterday.  Will continue warfarin 7.5 mg today.  Daily PT/INR ordered.  Monitor signs/symptoms of bleeding, dietary intake, and drug-drug interactions. Make dose adjustments as necessary.  Pharmacy will continue to follow.    Thank you for the " consult,    Braydon Bennett, RajD, BCPS   3/5/2024  08:29 EST

## 2024-03-05 NOTE — PROGRESS NOTES
Exercise Oximetry    Patient Name:Jesús Mijares   MRN: [id number]   Date: 03/05/24             ROOM AIR BASELINE   SpO2% 93   Heart Rate 84   Blood Pressure      EXERCISE ON ROOM AIR SpO2% EXERCISE ON O2 @ 2 LPM SpO2%   1 MINUTE 93 1 MINUTE    2 MINUTES 91 2 MINUTES    3 MINUTES 88 3 MINUTES    4 MINUTES  4 MINUTES      2L   89   5 MINUTES  5 MINUTES    90   6 MINUTES  6 MINUTES   91              Distance Walked   Distance Walked        200   Dyspnea (Benitez Scale)   Dyspnea (Benitez Scale)      3   Fatigue (Benitez Scale)   Fatigue (Benitez Scale)       3   SpO2% Post Exercise   SpO2% Post Exercise     91   HR Post Exercise   HR Post Exercise    117   Time to Recovery   Time to Recovery     2     Comments: pt was on room air and began walking laps around ICU, needed 2L on the second lap.    Recommend: 2L on exertion

## 2024-03-06 NOTE — OUTREACH NOTE
Prep Survey      Flowsheet Row Responses   [facility] facility patient discharged from? [facility]   Is LACE score < 7 ? No   Eligibility Readm Mgmt   Discharge diagnosis Hypoxemia/Bilateral PE/RSV   Does the patient have one of the following disease processes/diagnoses(primary or secondary)? Other   Does the patient have Home health ordered? No   Is there a DME ordered? Yes   What DME was ordered? [employer] for portable O2   Prep survey completed? Yes            DAYNE TERRY - Registered Nurse

## 2024-03-06 NOTE — PAYOR COMM NOTE
"To:  [employer]  From: Pura Mcneil RN  Phone: [phone]  Fax: [phone]  NPI: [id number]  TIN: [id number]  Member ID: [id number]   MRN: [id number]    Nicolas Mijares \"Ryne\" (28 y.o. Male)       Date of Birth   1995    Social Security Number       Address   [address] [place], [place] [address]    Home Phone   [phone]    MRN   [id number]       Oriental orthodox   None    Marital Status   Single                            Admission Date   3/2/24    Admission Type   Emergency    Admitting Provider   Shaq Guallpa MD    Attending Provider       Department, Room/Bed   [facility] INTENSIVE CARE, I07/       Discharge Date   3/5/2024    Discharge Disposition   Home or Self Care    Discharge Destination                                 Attending Provider: (none)   Allergies: Cherry Flavor, Lorabid [Loracarbef]    Isolation: None   Infection: None   Code Status: Prior    Ht: 185.4 cm (73\")   Wt: 202 kg (445 lb)    Admission Cmt: None   Principal Problem: Hypoxemia [R09.02]                   Active Insurance as of 3/2/2024       Primary Coverage       Payor Plan Insurance Group Employer/Plan Group    [employer] [place] [employer]        Payor Plan Address Payor Plan Phone Number Payor Plan Fax Number Effective Dates    [address]   2023 - None Entered    [facility] [address]         Subscriber Name Subscriber Birth Date Member ID       NICOLAS MIJARES 1995 [id number]                     Emergency Contacts        (Rel.) Home Phone Work Phone Mobile Phone    Elin Mijares (Mother) [phone] -- [phone]                 Discharge Summary        Yenni Quinonez APRN at 24 1120       Attestation signed by Al Baer DO at 24 1646    I have reviewed this documentation and agree.                      [facility] HOSPITALIST   DISCHARGE SUMMARY      Name:  Nicolas Mijares   Age:  28 y.o.  Sex:  male  :  " 1995  MRN:  [id number]   Visit Number:  [id number]    Admission Date:  3/2/2024  Date of Discharge:  3/5/2024  Primary Care Physician:  Chantal Gutierrez APRN    Important issues to note:    -Patient admitted for acute respiratory failure with hypoxia secondary to bilateral PEs noted.  Likely secondary to sedentary state with recent RSV and acute asthma exacerbation.  -Patient initiated on Coumadin and therapeutic Lovenox.  Will continue until seen by PCP in 2 days with repeat INR.  Prior INR 1.5.  -Per pharmacy recommendations patient to continue Lovenox with Coumadin until INR greater than 2 for 2 consecutive readings.  -To continue prednisone 40 mg x 4 days.  -Venous Dopplers negative.  -Strict return precautions given to patient and mother on phone with plan of care updated and all questions answered.  -He did require 2 L nasal cannula with exertion.  Otherwise reassuring labs and vitals noted.    Discharge Diagnoses:     Bilateral pulmonary embolism, POA  Acute RSV, POA  Acute respiratory failure with hypoxia likely secondary to above, POA  History of asthma with likely exacerbation secondary to above, POA  Cor pulmonale  Morbid obesity  Obstructive sleep apnea with CPAP    Problem List:     Active Hospital Problems    Diagnosis  POA    **Hypoxemia [R09.02]  Yes    Acute bronchitis [J20.9]  Yes    Positive D dimer [R79.89]  Yes    Microcytic anemia [D50.9]  Yes    Asthma [J45.909]  Yes    Cor pulmonale (chronic) [I27.81]  Yes    Morbid obesity [E66.01]  Yes      Resolved Hospital Problems   No resolved problems to display.     Presenting Problem:    Chief Complaint   Patient presents with    Shortness of Breath      Consults:     Consulting Physician(s)         Provider   Role Ludwig Mckeon MD      Consulting Physician Cardiology     Yosi Stephen MD      Consulting Physician Cardiology     Fernando Schmid,       Consulting Physician Hospitalist          Procedures  Performed:        History of presenting illness/Hospital Course:    Mr. Mijares is a pleasant 28-year-old male that presented to the emergency department due to shortness of air with pertinent past medical history of asthma, hypertension, morbid obesity, and cor pulmonale.  Patient began having shortness of air on February 28 while he was working at [employer].  Patient presented to PCP and he was noted to have RSV.  He was initiated on doxycycline with continued dyspnea and hypoxia noted prompting patient to return to the emergency department.    Upon ED presentation patient requiring 2 L nasal cannula and tachycardic with heart rate 116.  Pertinent labs and imaging included highly sensitive troponin 21, proBNP 181.4, sodium 134, D-dimer 3.98, WBC 13.48, hemoglobin 11.6, MCV 71.4, chest x-ray showing cardiomegaly without failure, EKG showing sinus tachycardia without acute ST or T wave changes, and CT noting large burden of bilateral acute pulmonary embolus with right heart strain as well as right upper lobe and left lower lobe patchy lung opacities consider multifocal pneumonia.  Procalcitonin remained negative.  Cardiology consulted Dr. Gee.  Given patient habitus he was initiated on Coumadin with therapeutic Lovenox as well.  Unfortunately echo was not able to visualize right-sided structures due to body habitus.  Troponin and proBNP remain negative.  Discussed with patient echoes versus mechanical thrombectomy versus oral anticoagulation.  At this time patient agreeable with oral anticoagulation.  Per cardiology will need to continue for 3 to 6 months with outpatient follow-up with hematology for thrombophilia workup.  PE likely precipitated due to sedentary state.  Fortunately, patient able to be weaned off of oxygen at rest.  He did require oxygen 2 L with exertion.  Otherwise reassuring labs and vitals noted.  He will be discharged on therapeutic Lovenox bridge  to follow-up closely with PCP for repeat INR in 2  days. Patient will also continue Coumadin 6.5 mg daily until seen by PCP.  INR upon discharge 1.5.  Patient to continue prednisone 40 mg x 4 days due to acute bronchitis with RSV and underlying asthma.  He does have CPAP at home for which utilization was encouraged.  He does follow with Dr. Bond.  Would also consider referral to hematology for thrombophilia workup.  Strict return precautions given to patient and mother.    Vital Signs:    Temp:  [97.4 °F (36.3 °C)-98.5 °F (36.9 °C)] 97.4 °F (36.3 °C)  Heart Rate:  [] 84  Resp:  [18-25] 22  BP: (119-138)/(68-83) 138/81    Physical Exam:    General Appearance:  Alert and cooperative.  Middle-age male.  No acute distress.  BMI 58.   Head:  Atraumatic and normocephalic.   Eyes: Conjunctivae and sclerae normal, no icterus. No pallor.   Ears:  Ears with no abnormalities noted.   Throat: No oral lesions, no thrush, oral mucosa moist.   Neck: Supple, trachea midline, no thyromegaly.   Back:   No kyphoscoliosis present. No tenderness to palpation.   Lungs:   Breath sounds heard bilaterally equally.  No crackles or wheezing. No Pleural rub or bronchial breathing.  On room air unlabored.   Heart:  Normal S1 and S2, no murmur, no gallop, no rub. No JVD.   Abdomen:   Normal bowel sounds, no masses, no organomegaly. Soft, nontender, nondistended, no rebound tenderness.   Extremities: Supple, no edema, no cyanosis, no clubbing.   Pulses: Pulses palpable bilaterally.   Skin: No bleeding or rash.   Neurologic: Alert and oriented x 3. No facial asymmetry. Moves all four limbs. No tremors.  Generalized weakness.     Pertinent Lab Results:     Results from last 7 days   Lab Units 03/05/24  0419 03/04/24  0411 03/03/24  0701 03/02/24  2251   SODIUM mmol/L 133* 132* 132* 134*   POTASSIUM mmol/L 4.5 5.1 4.6 4.4   CHLORIDE mmol/L 98 98 98 99   CO2 mmol/L 20.6* 21.2* 20.2* 23.7   BUN mg/dL 23* 21* 16 12   CREATININE mg/dL 0.74* 0.81 0.97 0.91   CALCIUM mg/dL 8.9 9.3 9.2 9.2    BILIRUBIN mg/dL  --  0.4 0.5 0.5   ALK PHOS U/L  --  94 101 96   ALT (SGPT) U/L  --  18 19 20   AST (SGOT) U/L  --  18 15 16   GLUCOSE mg/dL 140* 157* 171* 99     Results from last 7 days   Lab Units 03/05/24  0419 03/04/24  0411 03/03/24  0701   WBC 10*3/mm3 22.60* 20.29* 13.68*   HEMOGLOBIN g/dL 11.1* 11.5* 11.3*   HEMATOCRIT % 36.1* 36.7* 36.2*   PLATELETS 10*3/mm3 287 289 246     Results from last 7 days   Lab Units 03/05/24  0419 03/04/24  0411 03/03/24  1320   INR  1.50* 1.12* 1.08     Results from last 7 days   Lab Units 03/02/24  2251   HSTROP T ng/L 21     Results from last 7 days   Lab Units 03/02/24  2251   PROBNP pg/mL 181.4                       Pertinent Radiology Results:    Imaging Results (All)       Procedure Component Value Units Date/Time    US Venous Doppler Lower Extremity Bilateral (duplex) [[id number]] Collected: 03/04/24 2035     Updated: 03/04/24 2051    Narrative:      FINAL REPORT    TECHNIQUE:  Multiple transverse and longitudinal images were performed of  the femoral-popliteal deep venous system with augmentation and  compression maneuvers.    CLINICAL HISTORY:  PE    FINDINGS:  Exam quality is suboptimal due to the patient's large size, but  bilateral lower extremity duplex ultrasound demonstrates normal  flow in the visualized deep venous system. There is no abnormal  echogenicity to suggest thrombus. There is normal compression  and augmentation.      Impression:      Suboptimal evaluation, but no DVT identified.    Authenticated and Electronically Signed by Delbert Rowell M.D. on  03/04/2024 08:50:07 PM    CT Angiogram Chest Pulmonary Embolism [[id number]] Collected: 03/03/24 0405     Updated: 03/03/24 0414    Addenda:        ADDENDUM REPORT    ADDENDUM:  This report was discussed with Dr. Sousa on Mar 03, 2024 04:12:00 EST.    Authenticated and Electronically Signed by Kia Overton MD  on 03/03/2024 04:13:02 AM  Signed: 03/03/24 0413 by Kia Overton MD     Narrative:      FINAL REPORT    TECHNIQUE:  null    CLINICAL HISTORY:  Pulmonary embolism (PE) suspected; SOA    COMPARISON:  null    FINDINGS:  CT angiography chest with contrast. 3D Postprocessing.    Comparison: None    Findings:    Heart size is approaching the upper limits in size. RV/LV ratio of 1.2.    Unremarkable thoracic aorta and great vessels. No aneurysm or dissection.    The visualized thyroid and mediastinum are unremarkable.    Symmetric gynecomastia.    There are multiple acute segmental and subsegmental pulmonary emboli throughout both lower lobes, middle lobe, and right upper lobe. No definite emboli within the left upper lobe.    Patchy consolidation left lower lobe mostly medially. A few small patchy opacities also seen in the right upper lobe near the hilum.    Probable fatty liver. The liver is also likely enlarged although incompletely included on the exam.    Splenomegaly.    The bones are intact.      Impression:      IMPRESSION:    1. Large burden of bilateral acute pulmonary embolus. Right heart strain.    2. Right upper lobe and left lower lobe patchy lung opacities. Consider multifocal pneumonia.    3. Additional findings as above.    Authenticated and Electronically Signed by Kia Overton MD  on 03/03/2024 04:05:32 AM    XR Chest 1 View [[id number]] Collected: 03/03/24 0158     Updated: 03/03/24 0200    Narrative:      FINAL REPORT    TECHNIQUE:  null    CLINICAL HISTORY:  SOA, weakness, RSV 1 week ago    COMPARISON:  null    FINDINGS:  1 view chest    Comparison: None    Findings:    No consolidation or effusion.    No acute fracture.      Impression:      Impression:    1. Cardiomegaly without failure.    Authenticated and Electronically Signed by Kia Overton MD  on 03/03/2024 01:58:21 AM            Echo:    Results for orders placed during the hospital encounter of 03/02/24    Adult Transthoracic Echo Complete W/ Cont if Necessary Per Protocol    Interpretation Summary     Left ventricular ejection fraction appears to be 61 - 65%.    Left ventricular diastolic function was indeterminate.    The left atrial cavity is dilated.    No significant valvular abnormalities noted.    Insufficient TR jet to determine RVSP.    Due to body habitus intracardiac structures were poorly visualized despite the use of contrast.    Condition on Discharge:      Stable.    Code status during the hospital stay:    Code Status and Medical Interventions:   Ordered at: 03/03/24 0238     Code Status (Patient has no pulse and is not breathing):    CPR (Attempt to Resuscitate)     Medical Interventions (Patient has pulse or is breathing):    Full Support     Discharge Disposition:    Home or Self Care    Discharge Medications:       Discharge Medications        New Medications        Instructions Start Date   albuterol sulfate  (90 Base) MCG/ACT inhaler  Commonly known as: PROVENTIL HFA;VENTOLIN HFA;PROAIR HFA   2 puffs, Inhalation, Every 4 Hours PRN      Enoxaparin Sodium 80 MG/0.8ML solution prefilled syringe syringe  Commonly known as: LOVENOX   150 mg, Subcutaneous, Every 12 Hours      predniSONE 20 MG tablet  Commonly known as: DELTASONE   40 mg, Oral, Daily With Breakfast   Start Date: March 6, 2024     warfarin 1 MG tablet  Commonly known as: Coumadin   Take 6.5 mg in the evening until follow up with PCP             Continue These Medications        Instructions Start Date   amLODIPine 5 MG tablet  Commonly known as: NORVASC   5 mg, Oral, Daily      carvedilol 25 MG tablet  Commonly known as: COREG   25 mg, Oral, 2 Times Daily With Meals      lisinopril-hydrochlorothiazide 20-12.5 MG per tablet  Commonly known as: PRINZIDE,ZESTORETIC   1 tablet, Oral, Daily, 200001      triamcinolone 0.025 % cream  Commonly known as: KENALOG   APPLY TOPICALLY TO THE LEFT THIGH TWICE DAILY             Discharge Diet:     Diet Instructions       Diet: Cardiac Diets; Healthy Heart (2-3 Na+); Regular (IDDSI 7); Thin  (IDDSI 0)      Discharge Diet: Cardiac Diets    Cardiac Diet: Healthy Heart (2-3 Na+)    Texture: Regular (IDDSI 7)    Fluid Consistency: Thin (IDDSI 0)          Activity at Discharge:     Activity Instructions       Activity as Tolerated            Follow-up Appointments:     Follow-up Information       Chantal Gutierrez APRN. Go on 3/7/2024.    Specialty: Family Medicine  Why: If symptoms of shortness of air worsens or chest pain occurs before appointment, call 911 or go to the Emergency Room.     Otherwise, Go to appointment March 7, 2024 at 10 AM  Contact information:  [address] [facility]  [address]  [facility] [address]  [phone]                           Future Appointments   Date Time Provider Department Center   3/14/2024  2:00 PM Lauren Wellington APRN [facility]     Test Results Pending at Discharge:           IRIS Reed  03/05/24  11:20 EST    Time: I spent 45 minutes on this discharge activity which included: face-to-face encounter with the patient, reviewing the data in the system, coordination of the care with the nursing staff as well as consultants, documentation, and entering orders.     Dictated utilizing Dragon dictation.      Electronically signed by Al Baer DO at 03/05/24 0319

## 2024-03-08 ENCOUNTER — LAB (OUTPATIENT)
Dept: LAB | Facility: HOSPITAL | Age: 29
End: 2024-03-08
Payer: COMMERCIAL

## 2024-03-08 ENCOUNTER — TRANSCRIBE ORDERS (OUTPATIENT)
Dept: LAB | Facility: HOSPITAL | Age: 29
End: 2024-03-08
Payer: COMMERCIAL

## 2024-03-08 DIAGNOSIS — Z79.01 LONG TERM (CURRENT) USE OF ANTICOAGULANTS: Primary | ICD-10-CM

## 2024-03-08 DIAGNOSIS — Z79.01 LONG TERM (CURRENT) USE OF ANTICOAGULANTS: ICD-10-CM

## 2024-03-08 DIAGNOSIS — I10 ESSENTIAL HYPERTENSION: ICD-10-CM

## 2024-03-08 LAB
ANION GAP SERPL CALCULATED.3IONS-SCNC: 12.8 MMOL/L (ref 5–15)
BUN SERPL-MCNC: 19 MG/DL (ref 6–20)
BUN/CREAT SERPL: 22.4 (ref 7–25)
CALCIUM SPEC-SCNC: 9.1 MG/DL (ref 8.6–10.5)
CHLORIDE SERPL-SCNC: 98 MMOL/L (ref 98–107)
CO2 SERPL-SCNC: 23.2 MMOL/L (ref 22–29)
CREAT SERPL-MCNC: 0.85 MG/DL (ref 0.76–1.27)
EGFRCR SERPLBLD CKD-EPI 2021: 121.4 ML/MIN/1.73
GLUCOSE SERPL-MCNC: 131 MG/DL (ref 65–99)
INR PPP: 2.47 (ref 0.9–1.1)
POTASSIUM SERPL-SCNC: 4.6 MMOL/L (ref 3.5–5.2)
PROTHROMBIN TIME: 27.5 SECONDS (ref 12.3–15.1)
SODIUM SERPL-SCNC: 134 MMOL/L (ref 136–145)

## 2024-03-08 PROCEDURE — 80048 BASIC METABOLIC PNL TOTAL CA: CPT

## 2024-03-08 PROCEDURE — 36415 COLL VENOUS BLD VENIPUNCTURE: CPT

## 2024-03-08 PROCEDURE — 85610 PROTHROMBIN TIME: CPT

## 2024-03-12 ENCOUNTER — LAB (OUTPATIENT)
Dept: LAB | Facility: HOSPITAL | Age: 29
End: 2024-03-12
Payer: COMMERCIAL

## 2024-03-12 DIAGNOSIS — Z79.01 LONG TERM (CURRENT) USE OF ANTICOAGULANTS: ICD-10-CM

## 2024-03-12 LAB
INR PPP: 4 (ref 0.9–1.1)
PROTHROMBIN TIME: 39.8 SECONDS (ref 12.3–15.1)

## 2024-03-12 PROCEDURE — 85610 PROTHROMBIN TIME: CPT

## 2024-03-12 PROCEDURE — 36415 COLL VENOUS BLD VENIPUNCTURE: CPT

## 2024-03-13 ENCOUNTER — LAB (OUTPATIENT)
Dept: LAB | Facility: HOSPITAL | Age: 29
End: 2024-03-13
Payer: COMMERCIAL

## 2024-03-13 DIAGNOSIS — Z79.01 LONG TERM (CURRENT) USE OF ANTICOAGULANTS: ICD-10-CM

## 2024-03-13 LAB
INR PPP: 3.8 (ref 0.9–1.1)
PROTHROMBIN TIME: 38.3 SECONDS (ref 12.3–15.1)

## 2024-03-13 PROCEDURE — 85610 PROTHROMBIN TIME: CPT

## 2024-03-14 ENCOUNTER — OFFICE VISIT (OUTPATIENT)
Dept: PULMONOLOGY | Facility: CLINIC | Age: 29
End: 2024-03-14
Payer: COMMERCIAL

## 2024-03-14 ENCOUNTER — LAB (OUTPATIENT)
Dept: LAB | Facility: HOSPITAL | Age: 29
End: 2024-03-14
Payer: COMMERCIAL

## 2024-03-14 VITALS
BODY MASS INDEX: 41.75 KG/M2 | WEIGHT: 315 LBS | HEIGHT: 73 IN | SYSTOLIC BLOOD PRESSURE: 136 MMHG | OXYGEN SATURATION: 97 % | DIASTOLIC BLOOD PRESSURE: 84 MMHG | RESPIRATION RATE: 18 BRPM | HEART RATE: 92 BPM

## 2024-03-14 DIAGNOSIS — G47.33 OSA (OBSTRUCTIVE SLEEP APNEA): Primary | ICD-10-CM

## 2024-03-14 DIAGNOSIS — E66.01 MORBID OBESITY, UNSPECIFIED OBESITY TYPE: ICD-10-CM

## 2024-03-14 DIAGNOSIS — Z79.01 LONG TERM (CURRENT) USE OF ANTICOAGULANTS: ICD-10-CM

## 2024-03-14 DIAGNOSIS — I26.94 MULTIPLE SUBSEGMENTAL PULMONARY EMBOLI WITHOUT ACUTE COR PULMONALE: ICD-10-CM

## 2024-03-14 LAB
INR PPP: 3.01 (ref 0.9–1.1)
PROTHROMBIN TIME: 32 SECONDS (ref 12.3–15.1)

## 2024-03-14 PROCEDURE — 3079F DIAST BP 80-89 MM HG: CPT | Performed by: NURSE PRACTITIONER

## 2024-03-14 PROCEDURE — 3075F SYST BP GE 130 - 139MM HG: CPT | Performed by: NURSE PRACTITIONER

## 2024-03-14 PROCEDURE — 99214 OFFICE O/P EST MOD 30 MIN: CPT | Performed by: NURSE PRACTITIONER

## 2024-03-14 PROCEDURE — 36415 COLL VENOUS BLD VENIPUNCTURE: CPT

## 2024-03-14 PROCEDURE — 85610 PROTHROMBIN TIME: CPT

## 2024-03-14 NOTE — PROGRESS NOTES
"Chief Complaint   Patient presents with    Sleeping Problem    Follow-up         Subjective   Jesús Mijares is a 28 y.o. male.   The patient comes in today for follow-up of obstructive sleep apnea.    I reviewed his sleep study and discussed the results with him today.  The sleep study revealed moderate sleep apnea with an apnea hypopnea index of  16 per hour.      He has been set up with AutoPAP at a pressure of 6/14.  He feels he is not getting enough air. He was also admitted to hospital and is not on oxygen at night.     He was admitted to hospital with hypoxia and PE. He is using oxygen at night since he left hospital.     The patient had RSV and was treated outpatient at PCP then presented to ER for SOB. Hypoxia and PE were noted.     He is on coumadin and having to have INR checked often.       The following portions of the patient's history were reviewed and updated as appropriate: allergies, current medications, past family history, past medical history, past social history, and past surgical history.      Review of Systems   HENT:  Negative for sinus pressure, sneezing and sore throat.    Respiratory:  Negative for cough, chest tightness, shortness of breath and wheezing.    Psychiatric/Behavioral:  Negative for sleep disturbance.        Objective   Visit Vitals  /84   Pulse 92   Resp 18   Ht 185.4 cm (72.99\")   Wt (!) 255 kg (562 lb 9.6 oz)   SpO2 97%   BMI 74.24 kg/m²       Physical Exam  Vitals reviewed.   Constitutional:       Appearance: He is well-developed.   HENT:      Head: Atraumatic.      Mouth/Throat:      Mouth: Mucous membranes are moist.      Comments: Crowded oropharynx.   Cardiovascular:      Rate and Rhythm: Normal rate and regular rhythm.   Abdominal:      Comments: Obese abdomen.    Musculoskeletal:      Comments: Gait slow.   Skin:     General: Skin is warm.   Neurological:      Mental Status: He is alert and oriented to person, place, and time.           Assessment & Plan "   Diagnoses and all orders for this visit:    1. LORRAINE (obstructive sleep apnea) (Primary)  -     BIPAP / CPAP Adjustment    2. Morbid obesity, unspecified obesity type    3. Multiple subsegmental pulmonary emboli without acute cor pulmonale    Other orders  -     apixaban (ELIQUIS) 5 MG tablet tablet; Take 1 tablet by mouth 2 (Two) Times a Day.  Dispense: 60 tablet; Refill: 2           Return in about 3 months (around 6/14/2024) for Recheck, Sleep ONLY, For Dr. Bond.    DISCUSSION (if any):  Increase autopap 10-20 cm. He is not getting enough air.     If this does not help he will need a full night titration, especially since he was started on oxygen at night after last hospitalization.     Continue treatment with AutoPAP at a pressure of 10/20, with a full-face mask.     I have expressed the importance of using AutoPAP every night for at least 4 hours a night.     Patient's compliance data was reviewed and he is noncompliant at this time.     Humidification setup, hose and mask care discussed.    Weight loss advised.    Use every night for at least 4 hours stressed.     I reviewed D/C summary and CT result.     I have prescribed Eliquis to see if we can get approved if so we will change him from coumadin to Eliquis.     If he is going to require lifelong anticoagulation therapy at 28 years old, then Eliquis or Xarelto would be a much better and safer option than Coumadin if his insurance will approve it. He is currently on 2 different doses of Coumadin.    CT shows right heart strain and given multiple emboli bilaterally and morbid obesity lifelong therapy with Eliquis/Coumadin is likely warranted.   Study Result    Narrative & Impression   FINAL REPORT     TECHNIQUE:  null     CLINICAL HISTORY:  Pulmonary embolism (PE) suspected; SOA     COMPARISON:  null     FINDINGS:  CT angiography chest with contrast. 3D Postprocessing.     Comparison: None     Findings:     Heart size is approaching the upper limits in size.  RV/LV ratio of 1.2.     Unremarkable thoracic aorta and great vessels. No aneurysm or dissection.     The visualized thyroid and mediastinum are unremarkable.     Symmetric gynecomastia.     There are multiple acute segmental and subsegmental pulmonary emboli throughout both lower lobes, middle lobe, and right upper lobe. No definite emboli within the left upper lobe.     Patchy consolidation left lower lobe mostly medially. A few small patchy opacities also seen in the right upper lobe near the hilum.     Probable fatty liver. The liver is also likely enlarged although incompletely included on the exam.     Splenomegaly.     The bones are intact.     IMPRESSION:  IMPRESSION:     1. Large burden of bilateral acute pulmonary embolus. Right heart strain.     2. Right upper lobe and left lower lobe patchy lung opacities. Consider multifocal pneumonia.     3. Additional findings as above.     Authenticated and Electronically Signed by Kia Overton MD  on 03/03/2024 04:05:32 AM       A total of 31 minutes was spent reviewing all the information available including reviewing previous history from PCP/sleep specialist, as applicable, reviewing available sleep studies/compliance data.  This also included discussion regarding importance of sleep hygiene and possible lifestyle modifications, if applicable/necessary, that may impact sleep.  Time was also spent documenting information in electronic health record and placing orders, as appropriate and applicable.     Dictated utilizing Dragon dictation.    This document was electronically signed by IRIS Denise March 18, 2024  16:30 EDT

## 2024-03-15 ENCOUNTER — LAB (OUTPATIENT)
Dept: LAB | Facility: HOSPITAL | Age: 29
End: 2024-03-15
Payer: COMMERCIAL

## 2024-03-15 ENCOUNTER — READMISSION MANAGEMENT (OUTPATIENT)
Dept: CALL CENTER | Facility: HOSPITAL | Age: 29
End: 2024-03-15
Payer: COMMERCIAL

## 2024-03-15 DIAGNOSIS — Z79.01 LONG TERM (CURRENT) USE OF ANTICOAGULANTS: ICD-10-CM

## 2024-03-15 LAB
INR PPP: 2.73 (ref 0.9–1.1)
PROTHROMBIN TIME: 29.7 SECONDS (ref 12.3–15.1)

## 2024-03-15 PROCEDURE — 85610 PROTHROMBIN TIME: CPT

## 2024-03-15 NOTE — OUTREACH NOTE
Medical Week 2 Survey      Flowsheet Row Responses   [facility] patient discharged from? [place]   Does the patient have one of the following disease processes/diagnoses(primary or secondary)? Other   Week 2 attempt successful? Yes   Call start time 1226   Discharge diagnosis Hypoxemia/Bilateral PE/RSV   Call end time 1228   Meds reviewed with patient/caregiver? Yes   Is the patient having any side effects they believe may be caused by any medication additions or changes? No   Does the patient have all medications ordered at discharge? Yes   Is the patient taking all medications as directed (includes completed medication regime)? Yes   Does the patient have a primary care provider?  Yes   Does the patient have an appointment with their PCP within 7 days of discharge? Yes   Has the patient kept scheduled appointments due by today? Yes   What DME was ordered? [employer] for portable O2   Psychosocial issues? No   Did the patient receive a copy of their discharge instructions? Yes   Nursing interventions Reviewed instructions with patient   What is the patient's perception of their health status since discharge? Improving   Is the patient/caregiver able to teach back signs and symptoms related to disease process for when to call PCP? Yes   Is the patient/caregiver able to teach back signs and symptoms related to disease process for when to call 911? Yes   Is the patient/caregiver able to teach back the hierarchy of who to call/visit for symptoms/problems? PCP, Specialist, Home health nurse, Urgent Care, ED, 911 Yes   If the patient is a current smoker, are they able to teach back resources for cessation? Not a smoker   Week 2 Call Completed? Yes   Call end time 1228            ROSALIO HARVEY - Registered Nurse

## 2024-03-18 NOTE — PROGRESS NOTES
[facility] Cardiology Office Follow Up Note    Jesús Mijares  [id number]  2024    Primary Care Provider: Chantal Gutierrez APRN   Referring Provider: Chantal Gutierrez APRN    Chief Complaint: Follow-up of hypertension  History of Present Illness:   Mr. Jesús Mijares is a 28 y.o. male who presents to the Cardiology Clinic for follow up of hypertension.  The patient has a past medical history of morbid obesity, hypertension, asthma, and cor pulmonale.  His most recent history includes bilateral pulmonary emboli for which he opted for medical therapy as opposed to mechanical thrombectomy.  A review of records notes he is currently taking warfarin; his INR is managed by PCP.  He presents today for follow-up.  The patient reports feeling well today.  He specifically denies chest pain, but does acknowledge some shortness of breath with extended periods of walking (such as inside the clinic from the parking lot today).  He continues to take warfarin without significant bruising or bleeding for which she has had to seek medical care.  He notes that he has appointment to establish with hematology at the end of next month for a hypocoag workup.  He reports taking his prescribed antihypertensives without perceived side effects.  He offers no other complaints or concerns at this time.    Past Cardiac Testin. Last Coronary Angio: None  2. Prior Stress/Echo Testin2020  Dobutamine stress echocardiogram within normal limits attaining only 83% if target HR.  Left ventricular ejection fraction appears to be 61 - 65%.  No significant ST or T wave changes noted.  Baseline LVEF 60% with normal hyperdynamic response to dobutamine with no segmental wall motion abnormalities.  3. Last Echo: 3/3/2024    Left ventricular ejection fraction appears to be 61 - 65%.    Left ventricular diastolic function was indeterminate.    The left atrial cavity is dilated.    No significant  "valvular abnormalities noted.    Insufficient TR jet to determine RVSP.    Due to body habitus intracardiac structures were poorly visualized despite the use of contrast.  4. Prior Holter Monitor: None    Review of Systems:   Review of Systems  As Noted in HPI.   I have reviewed and confirmed the accuracy of the ROS as documented by the MA/LPN/RN IRIS Clifford    I have reviewed and/or updated the patient's past medical, past surgical, family, social history, problem list and allergies as appropriate.     Medications:     Current Outpatient Medications:     albuterol sulfate  (90 Base) MCG/ACT inhaler, Inhale 2 puffs Every 4 (Four) Hours As Needed for Wheezing., Disp: 18 g, Rfl: 0    amLODIPine (NORVASC) 5 MG tablet, Take 1 tablet by mouth Daily., Disp: 90 tablet, Rfl: 3    carvedilol (COREG) 25 MG tablet, Take 1 tablet by mouth 2 (Two) Times a Day With Meals., Disp: 180 tablet, Rfl: 3    lisinopril-hydrochlorothiazide (PRINZIDE,ZESTORETIC) 20-12.5 MG per tablet, Take 1 tablet by mouth Daily. 200001, Disp: 90 tablet, Rfl: 3    triamcinolone (KENALOG) 0.025 % cream, APPLY TOPICALLY TO THE LEFT THIGH TWICE DAILY, Disp: , Rfl:     warfarin (COUMADIN) 4 MG tablet, Take 1 tablet by mouth every evening, in addition to 2.5 mg tablet (6.5 mg daily),  until follow up with PCP, Disp: 6 tablet, Rfl: 0    warfarin (COUMADIN) 2.5 MG tablet, Take 1 tablet by mouth every evening, in additon to 4 mg tablet (6.5 mg daily), until follow up with PCP (Patient not taking: Reported on 3/22/2024), Disp: 6 tablet, Rfl: 0    Physical Exam:  Vital Signs:   Vitals:    03/22/24 1022   BP: 132/86   BP Location: Right arm   Patient Position: Sitting   Cuff Size: Large Adult   Pulse: 73   Resp: 24   Temp: 97.5 °F (36.4 °C)   TempSrc: Infrared   SpO2: 91%  Comment: RA   Weight: (!) 263 kg (580 lb)   Height: 182.9 cm (72\")     Body mass index is 78.66 kg/m².    Physical Exam  Vitals and nursing note reviewed.   Constitutional:  "      General: He is not in acute distress.     Appearance: He is morbidly obese.   HENT:      Head: Normocephalic and atraumatic.   Neck:      Trachea: Trachea normal.   Cardiovascular:      Rate and Rhythm: Normal rate and regular rhythm.      Pulses: Normal pulses.      Heart sounds: Normal heart sounds. No murmur heard.     No friction rub. No gallop.   Pulmonary:      Effort: Pulmonary effort is normal.      Breath sounds: Decreased air movement present.      Comments: Nonlabored breathing at rest  Musculoskeletal:         General: Swelling present.      Cervical back: Neck supple.      Right lower leg: Edema present.      Left lower leg: Edema present.   Skin:     General: Skin is warm and dry.   Neurological:      Mental Status: He is alert and oriented to person, place, and time.   Psychiatric:         Mood and Affect: Mood normal.         Behavior: Behavior normal. Behavior is cooperative.         Thought Content: Thought content does not include suicidal ideation.         Results Review:   I reviewed the patient's new clinical results.    Procedures    Assessment / Plan:   Diagnoses and all orders for this visit:    1. Essential hypertension (Primary)  Acceptable blood pressure today  Refill antihypertensives x 1 year    2. History of pulmonary embolism  Ongoing anticoagulation with INR management per PCP  Appointment scheduled for the patient to see hematology at the end of next month    3. Cor pulmonale (chronic)  Secondary to morbid obesity    4. Morbid obesity  BMI 78.66      Preventative Cardiology:   Tobacco Cessation: N/A   Advance Care Planning: ACP discussion was declined by the patient. Patient does not have an advance directive, declines further assistance.     Follow Up:   Return in about 1 year (around 3/22/2025) for Follow-up with Dr. Petty.      Thank you for allowing me to participate in the care of your patient. Please do not hesitate to contact me with additional questions or concerns.      Yenni Espinosa, APRN

## 2024-03-21 ENCOUNTER — LAB (OUTPATIENT)
Dept: LAB | Facility: HOSPITAL | Age: 29
End: 2024-03-21
Payer: COMMERCIAL

## 2024-03-21 DIAGNOSIS — Z79.01 LONG TERM (CURRENT) USE OF ANTICOAGULANTS: ICD-10-CM

## 2024-03-21 LAB
INR PPP: 1.91 (ref 0.9–1.1)
PROTHROMBIN TIME: 22.5 SECONDS (ref 12.3–15.1)

## 2024-03-21 PROCEDURE — 85610 PROTHROMBIN TIME: CPT

## 2024-03-21 PROCEDURE — 36415 COLL VENOUS BLD VENIPUNCTURE: CPT

## 2024-03-22 ENCOUNTER — OFFICE VISIT (OUTPATIENT)
Dept: CARDIOLOGY | Facility: CLINIC | Age: 29
End: 2024-03-22
Payer: COMMERCIAL

## 2024-03-22 VITALS
BODY MASS INDEX: 42.66 KG/M2 | HEART RATE: 73 BPM | SYSTOLIC BLOOD PRESSURE: 132 MMHG | OXYGEN SATURATION: 91 % | TEMPERATURE: 97.5 F | HEIGHT: 72 IN | DIASTOLIC BLOOD PRESSURE: 86 MMHG | WEIGHT: 315 LBS | RESPIRATION RATE: 24 BRPM

## 2024-03-22 DIAGNOSIS — Z86.711 HISTORY OF PULMONARY EMBOLISM: ICD-10-CM

## 2024-03-22 DIAGNOSIS — I27.81 COR PULMONALE (CHRONIC): ICD-10-CM

## 2024-03-22 DIAGNOSIS — I10 ESSENTIAL HYPERTENSION: Primary | ICD-10-CM

## 2024-03-22 DIAGNOSIS — E66.01 MORBID OBESITY: ICD-10-CM

## 2024-03-22 RX ORDER — CARVEDILOL 25 MG/1
25 TABLET ORAL 2 TIMES DAILY WITH MEALS
Qty: 180 TABLET | Refills: 3 | Status: SHIPPED | OUTPATIENT
Start: 2024-03-22

## 2024-03-22 RX ORDER — LISINOPRIL AND HYDROCHLOROTHIAZIDE 20; 12.5 MG/1; MG/1
1 TABLET ORAL DAILY
Qty: 90 TABLET | Refills: 3 | Status: SHIPPED | OUTPATIENT
Start: 2024-03-22

## 2024-03-22 RX ORDER — AMLODIPINE BESYLATE 5 MG/1
5 TABLET ORAL DAILY
Qty: 90 TABLET | Refills: 3 | Status: SHIPPED | OUTPATIENT
Start: 2024-03-22

## 2024-04-04 ENCOUNTER — LAB (OUTPATIENT)
Dept: LAB | Facility: HOSPITAL | Age: 29
End: 2024-04-04
Payer: COMMERCIAL

## 2024-04-04 DIAGNOSIS — Z79.01 LONG TERM (CURRENT) USE OF ANTICOAGULANTS: ICD-10-CM

## 2024-04-04 LAB
INR PPP: 2.14 (ref 0.9–1.1)
PROTHROMBIN TIME: 24.6 SECONDS (ref 12.3–15.1)

## 2024-04-04 PROCEDURE — 85610 PROTHROMBIN TIME: CPT

## 2024-04-04 PROCEDURE — 36415 COLL VENOUS BLD VENIPUNCTURE: CPT

## 2024-04-16 ENCOUNTER — LAB (OUTPATIENT)
Dept: LAB | Facility: HOSPITAL | Age: 29
End: 2024-04-16
Payer: COMMERCIAL

## 2024-04-16 DIAGNOSIS — Z79.01 LONG TERM (CURRENT) USE OF ANTICOAGULANTS: ICD-10-CM

## 2024-04-16 LAB
INR PPP: 1.92 (ref 0.9–1.1)
PROTHROMBIN TIME: 22.6 SECONDS (ref 12.3–15.1)

## 2024-04-16 PROCEDURE — 85610 PROTHROMBIN TIME: CPT

## 2024-04-16 PROCEDURE — 36415 COLL VENOUS BLD VENIPUNCTURE: CPT

## 2024-04-29 ENCOUNTER — CONSULT (OUTPATIENT)
Dept: ONCOLOGY | Facility: CLINIC | Age: 29
End: 2024-04-29
Payer: COMMERCIAL

## 2024-04-29 ENCOUNTER — LAB (OUTPATIENT)
Dept: LAB | Facility: HOSPITAL | Age: 29
End: 2024-04-29
Payer: COMMERCIAL

## 2024-04-29 VITALS
DIASTOLIC BLOOD PRESSURE: 63 MMHG | SYSTOLIC BLOOD PRESSURE: 134 MMHG | OXYGEN SATURATION: 99 % | BODY MASS INDEX: 41.75 KG/M2 | HEART RATE: 76 BPM | HEIGHT: 73 IN | TEMPERATURE: 97.8 F | WEIGHT: 315 LBS | RESPIRATION RATE: 20 BRPM

## 2024-04-29 DIAGNOSIS — Z79.01 LONG TERM (CURRENT) USE OF ANTICOAGULANTS: ICD-10-CM

## 2024-04-29 DIAGNOSIS — I26.99 OTHER ACUTE PULMONARY EMBOLISM, UNSPECIFIED WHETHER ACUTE COR PULMONALE PRESENT: Primary | ICD-10-CM

## 2024-04-29 DIAGNOSIS — I26.99 OTHER ACUTE PULMONARY EMBOLISM, UNSPECIFIED WHETHER ACUTE COR PULMONALE PRESENT: ICD-10-CM

## 2024-04-29 LAB
ALBUMIN SERPL-MCNC: 4.2 G/DL (ref 3.5–5.2)
ALBUMIN/GLOB SERPL: 1.4 G/DL
ALP SERPL-CCNC: 83 U/L (ref 39–117)
ALT SERPL W P-5'-P-CCNC: 24 U/L (ref 1–41)
ANION GAP SERPL CALCULATED.3IONS-SCNC: 11 MMOL/L (ref 5–15)
AST SERPL-CCNC: 17 U/L (ref 1–40)
BILIRUB SERPL-MCNC: 0.4 MG/DL (ref 0–1.2)
BUN SERPL-MCNC: 8 MG/DL (ref 6–20)
BUN/CREAT SERPL: 9.8 (ref 7–25)
CALCIUM SPEC-SCNC: 9.3 MG/DL (ref 8.6–10.5)
CHLORIDE SERPL-SCNC: 100 MMOL/L (ref 98–107)
CO2 SERPL-SCNC: 25 MMOL/L (ref 22–29)
CREAT SERPL-MCNC: 0.82 MG/DL (ref 0.76–1.27)
DEPRECATED RDW RBC AUTO: 44.6 FL (ref 37–54)
EGFRCR SERPLBLD CKD-EPI 2021: 122.7 ML/MIN/1.73
ERYTHROCYTE [DISTWIDTH] IN BLOOD BY AUTOMATED COUNT: 17.6 % (ref 12.3–15.4)
GLOBULIN UR ELPH-MCNC: 2.9 GM/DL
GLUCOSE SERPL-MCNC: 93 MG/DL (ref 65–99)
HCT VFR BLD AUTO: 35.4 % (ref 37.5–51)
HGB BLD-MCNC: 10.7 G/DL (ref 13–17.7)
INR PPP: 2.14 (ref 0.9–1.1)
MCH RBC QN AUTO: 21.7 PG (ref 26.6–33)
MCHC RBC AUTO-ENTMCNC: 30.2 G/DL (ref 31.5–35.7)
MCV RBC AUTO: 71.7 FL (ref 79–97)
NRBC BLD AUTO-RTO: 0 /100 WBC (ref 0–0.2)
PLATELET # BLD AUTO: 328 10*3/MM3 (ref 140–450)
PMV BLD AUTO: 10.7 FL (ref 6–12)
POTASSIUM SERPL-SCNC: 4.5 MMOL/L (ref 3.5–5.2)
PROT SERPL-MCNC: 7.1 G/DL (ref 6–8.5)
PROTHROMBIN TIME: 24.6 SECONDS (ref 12.3–15.1)
RBC # BLD AUTO: 4.94 10*6/MM3 (ref 4.14–5.8)
SODIUM SERPL-SCNC: 136 MMOL/L (ref 136–145)
WBC NRBC COR # BLD AUTO: 8.46 10*3/MM3 (ref 3.4–10.8)

## 2024-04-29 PROCEDURE — 3075F SYST BP GE 130 - 139MM HG: CPT | Performed by: INTERNAL MEDICINE

## 2024-04-29 PROCEDURE — 85025 COMPLETE CBC W/AUTO DIFF WBC: CPT

## 2024-04-29 PROCEDURE — 81241 F5 GENE: CPT

## 2024-04-29 PROCEDURE — 80053 COMPREHEN METABOLIC PANEL: CPT

## 2024-04-29 PROCEDURE — 99204 OFFICE O/P NEW MOD 45 MIN: CPT | Performed by: INTERNAL MEDICINE

## 2024-04-29 PROCEDURE — 36415 COLL VENOUS BLD VENIPUNCTURE: CPT

## 2024-04-29 PROCEDURE — 3078F DIAST BP <80 MM HG: CPT | Performed by: INTERNAL MEDICINE

## 2024-04-29 PROCEDURE — 85007 BL SMEAR W/DIFF WBC COUNT: CPT

## 2024-04-29 PROCEDURE — 85610 PROTHROMBIN TIME: CPT

## 2024-04-29 PROCEDURE — 1126F AMNT PAIN NOTED NONE PRSNT: CPT | Performed by: INTERNAL MEDICINE

## 2024-04-29 PROCEDURE — 81240 F2 GENE: CPT

## 2024-04-29 RX ORDER — AZELASTINE 1 MG/ML
2 SPRAY, METERED NASAL 2 TIMES DAILY
COMMUNITY

## 2024-04-29 NOTE — LETTER
2024       No Recipients    Patient: Jesús Mijares   YOB: 1995   Date of Visit: 2024     Dear IRIS Stoddard:       Thank you for referring Jesús Mijares to me for evaluation. Below are the relevant portions of my assessment and plan of care.    If you have questions, please do not hesitate to call me. I look forward to following Jesús along with you.         Sincerely,        Yolanda Leon MD        CC:   No Recipients    Yolanda Leon MD  24 1808  Sign when Signing Visit       Hematology and Oncology [place]  Office number [phone]    Fax number [phone]    New Patient Office Visit      Date: 2024     Patient Name: Jesús Mijares  MRN: [id number]  : 1995    Referred by: IRIS Ro    Chief Complaint: Bilateral PE    History of Present Illness: Jesús Mijares is a pleasant 28 y.o. male who presents today for evaluation of bilateral PE    The patient presented with progressive dyspnea and underwent CT of the chest to the ER 3/4/24 demonstrating  large burden of bilateral acute pulmonary embolus. Right heart strain.Right upper lobe and left lower lobe patchy lung opacities. Consider multifocal pneumonia.  Hospital notes mention being tested for RSV on 24, but he does not specifically recall that and his respiratory panel PCR from 3/3/2024 was negative. No preceding surgeries, travel. Works at [employer] , on feet 8 hours per day for this job.    He was assessed by cardiology, but no thrombectomy was recommended.  He was recommended for Coumadin over DOAC given that Body mass index is 76.52 kg/m².  .   Reports his most recent INR was 4 and coumadin held 2 days, having labs redrawn today for PCP. Prior to holding he was on 8 mg daily. Unaware of dietary restrictions with Coumadin but states that he rarely eats fruits or vegetables.    Review of Systems:   I have reviewed the review of systems completed  by the patient. This is negative for clinically significant symptoms except as noted below. This document has been scanned into the patient's chart.  Fatigue, chest pain and palpitations prior to his presentation.  Dyspnea on exertion.  Easy bruising.  Varicose veins.    Past Medical History:   Past Medical History:   Diagnosis Date   • Asthma, intrinsic    • Bronchitis    • Hypertension    • Pulmonary embolism 2024       Past Surgical History:   Past Surgical History:   Procedure Laterality Date   • TONSILECTOMY, ADENOIDECTOMY, BILATERAL MYRINGOTOMY AND TUBES Bilateral        Family History:   Family History   Problem Relation Age of Onset   • Hypothyroidism Mother    • Osteoarthritis Mother    • Hypertension Mother    • Depression Mother    • Anxiety disorder Mother    • Obesity Mother    • Obesity Father    • Depression Father    • Hypertension Father    • Diabetes Father    • Hypertension Brother    • No Known Problems Maternal Grandmother    • Hypertension Other    Grandmother  from blood clots at age 65. No other family of blood clots or sudden death.   Reportedly his grandmother had 4 types of breast cancer at age 16, 32, 47.  Aunt with thyroid cancer at age 43.  Aunt with melanoma at age 41.    Social History:   Social History     Socioeconomic History   • Marital status: Single   Tobacco Use   • Smoking status: Former     Current packs/day: 0.00     Types: Cigarettes     Quit date:      Years since quittin.3   • Smokeless tobacco: Never   • Tobacco comments:     patient smoke 3 months   Vaping Use   • Vaping status: Never Used   Substance and Sexual Activity   • Alcohol use: No   • Drug use: No   • Sexual activity: Defer       Medications:     Current Outpatient Medications:   •  albuterol sulfate  (90 Base) MCG/ACT inhaler, Inhale 2 puffs Every 4 (Four) Hours As Needed for Wheezing., Disp: 18 g, Rfl: 0  •  amLODIPine (NORVASC) 5 MG tablet, Take 1 tablet by mouth Daily., Disp: 90  "tablet, Rfl: 3  •  azelastine (ASTELIN) 0.1 % nasal spray, 2 sprays into the nostril(s) as directed by provider 2 (Two) Times a Day. Use in each nostril as directed, Disp: , Rfl:   •  carvedilol (COREG) 25 MG tablet, Take 1 tablet by mouth 2 (Two) Times a Day With Meals., Disp: 180 tablet, Rfl: 3  •  lisinopril-hydrochlorothiazide (PRINZIDE,ZESTORETIC) 20-12.5 MG per tablet, Take 1 tablet by mouth Daily. 200001, Disp: 90 tablet, Rfl: 3  •  triamcinolone (KENALOG) 0.025 % cream, APPLY TOPICALLY TO THE LEFT THIGH TWICE DAILY, Disp: , Rfl:   •  warfarin (COUMADIN) 2.5 MG tablet, Take 1 tablet by mouth every evening, in additon to 4 mg tablet (6.5 mg daily), until follow up with PCP, Disp: 6 tablet, Rfl: 0  •  warfarin (COUMADIN) 4 MG tablet, Take 1 tablet by mouth every evening, in addition to 2.5 mg tablet (6.5 mg daily),  until follow up with PCP, Disp: 6 tablet, Rfl: 0    Allergies:   Allergies   Allergen Reactions   • Cherry Flavor Hives   • Lorabid [Loracarbef] Hives       Objective     Vital Signs:   Vitals:    04/29/24 1259   BP: 134/63   Pulse: 76   Resp: 20   Temp: 97.8 °F (36.6 °C)   SpO2: 99%   Weight: (!) 263 kg (580 lb)  Comment: Per Patient   Height: 185.4 cm (73\")   PainSc: 0-No pain    Body mass index is 76.52 kg/m².   Pain Score    04/29/24 1259   PainSc: 0-No pain       Physical Exam:   General: Morbidly obese no acute distress.HEENT: Normocephalic, atraumatic. Sclera anicteric.   Neck: supple, no adenopathy.   Cardiovascular: regular rate and rhythm. No murmurs.   Respiratory: Normal rate. Clear to auscultation bilaterally.  Abdomen: Soft, nontender, non distended with normoactive bowel sounds. No hepatosplenomegaly  Lymph: no cervical, supraclavicular or axillary adenopathy.  Neuro: Alert and oriented x 3. No focal deficits.   Ext: Symmetric, no swelling.   Psych: Euthymic      Laboratory/Imaging Reviewed:   Lab on 04/29/2024   Component Date Value Ref Range Status   • Protime 04/29/2024 24.6 (H)  " 12.3 - 15.1 Seconds Final   • INR 04/29/2024 2.14 (H)  0.90 - 1.10 Final   Lab on 04/16/2024   Component Date Value Ref Range Status   • Protime 04/16/2024 22.6 (H)  12.3 - 15.1 Seconds Final   • INR 04/16/2024 1.92 (H)  0.90 - 1.10 Final       No results found.    Assessment / Plan      Assessment/Plan:     1. Other acute pulmonary embolism, unspecified whether acute cor pulmonale present  2. Body mass index is 76.52 kg/m².    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  There is a family history of DVT, but this occurred later in life in a grandparent.  -He has no specific history of high risk of travel or preceding illness/immobility.  Although his BMI is elevated, he reports that he stands for 8 hours a day and to work as a .  -I agree with Coumadin given his elevated BMI.  His INR today is in range and I would recommend he continue to follow with his primary care for close INR monitoring.  -I recommended vitamin K consistent diet check counseled him on today.  I also referred him to the dietitian.  -He will benefit from a hypercoagulable workup, however we will perform a limited evaluation today in the context of his ongoing atnicoagulation.  Orders Placed This Encounter   Procedures   • Comprehensive Metabolic Panel   • Protime-INR   • Factor 5 Leiden   • Prothrombin gene mutation   • Beta-2 Glycoprotein Antibodies   • Anticardiolipin Antibody, IgG / M, Qn   • CBC & Differential         3.  Strong family history of malignancy including multiple family members with early onset malignancy  -Ultimately, I would recommend consideration of genetic testing  -     Comprehensive Metabolic Panel; Future  -     Protime-INR; Future  -     Factor 5 Leiden; Future  -     Prothrombin gene mutation; Future         Follow Up:   1-2 mo     Yolanda Leon MD  Hematology and Oncology     Time spent on the day of service was 45 minutes inclusive of time before, during, and after office visit on  record review, medically appropriate history and physical, counseling patient, ordering tests, documenting in the medical record, and communicating with referring provider.

## 2024-04-29 NOTE — PROGRESS NOTES
Hematology and Oncology [place]  Office number [phone]    Fax number [phone]    New Patient Office Visit      Date: 2024     Patient Name: Jesús Mijares  MRN: [id number]  : 1995    Referred by: IRIS Ro    Chief Complaint: Bilateral PE    History of Present Illness: Jesús Mijares is a pleasant 28 y.o. male who presents today for evaluation of bilateral PE    The patient presented with progressive dyspnea and underwent CT of the chest to the ER 3/4/24 demonstrating  large burden of bilateral acute pulmonary embolus. Right heart strain.Right upper lobe and left lower lobe patchy lung opacities. Consider multifocal pneumonia.  Hospital notes mention being tested for RSV on 24, but he does not specifically recall that and his respiratory panel PCR from 3/3/2024 was negative. No preceding surgeries, travel. Works at [employer], on feet 8 hours per day for this job.    He was assessed by cardiology, but no thrombectomy was recommended.  He was recommended for Coumadin over DOAC given that Body mass index is 76.52 kg/m².  .   Reports his most recent INR was 4 and coumadin held 2 days, having labs redrawn today for PCP. Prior to holding he was on 8 mg daily. Unaware of dietary restrictions with Coumadin but states that he rarely eats fruits or vegetables.    Review of Systems:   I have reviewed the review of systems completed by the patient. This is negative for clinically significant symptoms except as noted below. This document has been scanned into the patient's chart.  Fatigue, chest pain and palpitations prior to his presentation.  Dyspnea on exertion.  Easy bruising.  Varicose veins.    Past Medical History:   Past Medical History:   Diagnosis Date    Asthma, intrinsic     Bronchitis     Hypertension     Pulmonary embolism 2024       Past Surgical History:   Past Surgical History:   Procedure Laterality Date    TONSILECTOMY, ADENOIDECTOMY,  BILATERAL MYRINGOTOMY AND TUBES Bilateral        Family History:   Family History   Problem Relation Age of Onset    Hypothyroidism Mother     Osteoarthritis Mother     Hypertension Mother     Depression Mother     Anxiety disorder Mother     Obesity Mother     Obesity Father     Depression Father     Hypertension Father     Diabetes Father     Hypertension Brother     No Known Problems Maternal Grandmother     Hypertension Other    Grandmother  from blood clots at age 65. No other family of blood clots or sudden death.   Reportedly his grandmother had 4 types of breast cancer at age 16, 32, 47.  Aunt with thyroid cancer at age 43.  Aunt with melanoma at age 41.    Social History:   Social History     Socioeconomic History    Marital status: Single   Tobacco Use    Smoking status: Former     Current packs/day: 0.00     Types: Cigarettes     Quit date:      Years since quittin.3    Smokeless tobacco: Never    Tobacco comments:     patient smoke 3 months   Vaping Use    Vaping status: Never Used   Substance and Sexual Activity    Alcohol use: No    Drug use: No    Sexual activity: Defer       Medications:     Current Outpatient Medications:     albuterol sulfate  (90 Base) MCG/ACT inhaler, Inhale 2 puffs Every 4 (Four) Hours As Needed for Wheezing., Disp: 18 g, Rfl: 0    amLODIPine (NORVASC) 5 MG tablet, Take 1 tablet by mouth Daily., Disp: 90 tablet, Rfl: 3    azelastine (ASTELIN) 0.1 % nasal spray, 2 sprays into the nostril(s) as directed by provider 2 (Two) Times a Day. Use in each nostril as directed, Disp: , Rfl:     carvedilol (COREG) 25 MG tablet, Take 1 tablet by mouth 2 (Two) Times a Day With Meals., Disp: 180 tablet, Rfl: 3    lisinopril-hydrochlorothiazide (PRINZIDE,ZESTORETIC) 20-12.5 MG per tablet, Take 1 tablet by mouth Daily. , Disp: 90 tablet, Rfl: 3    triamcinolone (KENALOG) 0.025 % cream, APPLY TOPICALLY TO THE LEFT THIGH TWICE DAILY, Disp: , Rfl:     warfarin (COUMADIN)  "2.5 MG tablet, Take 1 tablet by mouth every evening, in additon to 4 mg tablet (6.5 mg daily), until follow up with PCP, Disp: 6 tablet, Rfl: 0    warfarin (COUMADIN) 4 MG tablet, Take 1 tablet by mouth every evening, in addition to 2.5 mg tablet (6.5 mg daily),  until follow up with PCP, Disp: 6 tablet, Rfl: 0    Allergies:   Allergies   Allergen Reactions    Cherry Flavor Hives    Lorabid [Loracarbef] Hives       Objective     Vital Signs:   Vitals:    04/29/24 1259   BP: 134/63   Pulse: 76   Resp: 20   Temp: 97.8 °F (36.6 °C)   SpO2: 99%   Weight: (!) 263 kg (580 lb)  Comment: Per Patient   Height: 185.4 cm (73\")   PainSc: 0-No pain    Body mass index is 76.52 kg/m².   Pain Score    04/29/24 1259   PainSc: 0-No pain       Physical Exam:   General: Morbidly obese no acute distress.HEENT: Normocephalic, atraumatic. Sclera anicteric.   Neck: supple, no adenopathy.   Cardiovascular: regular rate and rhythm. No murmurs.   Respiratory: Normal rate. Clear to auscultation bilaterally.  Abdomen: Soft, nontender, non distended with normoactive bowel sounds. No hepatosplenomegaly  Lymph: no cervical, supraclavicular or axillary adenopathy.  Neuro: Alert and oriented x 3. No focal deficits.   Ext: Symmetric, no swelling.   Psych: Euthymic      Laboratory/Imaging Reviewed:   Lab on 04/29/2024   Component Date Value Ref Range Status    Protime 04/29/2024 24.6 (H)  12.3 - 15.1 Seconds Final    INR 04/29/2024 2.14 (H)  0.90 - 1.10 Final   Lab on 04/16/2024   Component Date Value Ref Range Status    Protime 04/16/2024 22.6 (H)  12.3 - 15.1 Seconds Final    INR 04/16/2024 1.92 (H)  0.90 - 1.10 Final       No results found.    Assessment / Plan      Assessment/Plan:     1. Other acute pulmonary embolism, unspecified whether acute cor pulmonale present  2. Body mass index is 76.52 kg/m².    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  There is a family history of DVT, but this occurred later in life in a " grandparent.  -He has no specific history of high risk of travel or preceding illness/immobility.  Although his BMI is elevated, he reports that he stands for 8 hours a day and to work as a .  -I agree with Coumadin given his elevated BMI.  His INR today is in range and I would recommend he continue to follow with his primary care for close INR monitoring.  -I recommended vitamin K consistent diet check counseled him on today.  I also referred him to the dietitian.  -He will benefit from a hypercoagulable workup, however we will perform a limited evaluation today in the context of his ongoing atnicoagulation.  Orders Placed This Encounter   Procedures    Comprehensive Metabolic Panel    Protime-INR    Factor 5 Leiden    Prothrombin gene mutation    Beta-2 Glycoprotein Antibodies    Anticardiolipin Antibody, IgG / M, Qn    CBC & Differential         3.  Strong family history of malignancy including multiple family members with early onset malignancy  -Ultimately, I would recommend consideration of genetic testing  -     Comprehensive Metabolic Panel; Future  -     Protime-INR; Future  -     Factor 5 Leiden; Future  -     Prothrombin gene mutation; Future         Follow Up:   1-2 mo     Yolanda Leon MD  Hematology and Oncology     Time spent on the day of service was 45 minutes inclusive of time before, during, and after office visit on record review, medically appropriate history and physical, counseling patient, ordering tests, documenting in the medical record, and communicating with referring provider.

## 2024-04-30 LAB
ANISOCYTOSIS BLD QL: ABNORMAL
BURR CELLS BLD QL SMEAR: ABNORMAL
EOSINOPHIL # BLD MANUAL: 0.26 10*3/MM3 (ref 0–0.4)
EOSINOPHIL NFR BLD MANUAL: 3.1 % (ref 0.3–6.2)
LYMPHOCYTES # BLD MANUAL: 1.56 10*3/MM3 (ref 0.7–3.1)
LYMPHOCYTES NFR BLD MANUAL: 1 % (ref 5–12)
MICROCYTES BLD QL: ABNORMAL
MONOCYTES # BLD: 0.08 10*3/MM3 (ref 0.1–0.9)
NEUTROPHILS # BLD AUTO: 6.56 10*3/MM3 (ref 1.7–7)
NEUTROPHILS NFR BLD MANUAL: 77.6 % (ref 42.7–76)
OVALOCYTES BLD QL SMEAR: ABNORMAL
PLAT MORPH BLD: NORMAL
POIKILOCYTOSIS BLD QL SMEAR: ABNORMAL
POLYCHROMASIA BLD QL SMEAR: ABNORMAL
VARIANT LYMPHS NFR BLD MANUAL: 18.4 % (ref 19.6–45.3)
WBC MORPH BLD: NORMAL

## 2024-05-01 LAB
F5 GENE MUT ANL BLD/T: NORMAL
FACTOR II, DNA ANALYSIS: NORMAL

## 2024-05-03 ENCOUNTER — LAB (OUTPATIENT)
Dept: LAB | Facility: HOSPITAL | Age: 29
End: 2024-05-03
Payer: COMMERCIAL

## 2024-05-03 DIAGNOSIS — I26.99 OTHER ACUTE PULMONARY EMBOLISM, UNSPECIFIED WHETHER ACUTE COR PULMONALE PRESENT: ICD-10-CM

## 2024-05-03 DIAGNOSIS — Z79.01 LONG TERM (CURRENT) USE OF ANTICOAGULANTS: ICD-10-CM

## 2024-05-03 LAB
INR PPP: 1.49 (ref 0.9–1.1)
PROTHROMBIN TIME: 18.7 SECONDS (ref 12.3–15.1)

## 2024-05-03 PROCEDURE — 86146 BETA-2 GLYCOPROTEIN ANTIBODY: CPT

## 2024-05-03 PROCEDURE — 86147 CARDIOLIPIN ANTIBODY EA IG: CPT

## 2024-05-03 PROCEDURE — 85610 PROTHROMBIN TIME: CPT

## 2024-05-03 PROCEDURE — 36415 COLL VENOUS BLD VENIPUNCTURE: CPT

## 2024-05-04 LAB
CARDIOLIPIN IGG SER IA-ACNC: <9 GPL U/ML (ref 0–14)
CARDIOLIPIN IGM SER IA-ACNC: <9 MPL U/ML (ref 0–12)

## 2024-05-06 ENCOUNTER — TELEPHONE (OUTPATIENT)
Dept: ONCOLOGY | Facility: CLINIC | Age: 29
End: 2024-05-06
Payer: COMMERCIAL

## 2024-05-06 ENCOUNTER — LAB (OUTPATIENT)
Dept: LAB | Facility: HOSPITAL | Age: 29
End: 2024-05-06
Payer: COMMERCIAL

## 2024-05-06 DIAGNOSIS — Z79.01 LONG TERM (CURRENT) USE OF ANTICOAGULANTS: ICD-10-CM

## 2024-05-06 DIAGNOSIS — I26.99 OTHER ACUTE PULMONARY EMBOLISM, UNSPECIFIED WHETHER ACUTE COR PULMONALE PRESENT: ICD-10-CM

## 2024-05-06 DIAGNOSIS — I26.99 OTHER ACUTE PULMONARY EMBOLISM, UNSPECIFIED WHETHER ACUTE COR PULMONALE PRESENT: Primary | ICD-10-CM

## 2024-05-06 LAB
FERRITIN SERPL-MCNC: 70.18 NG/ML (ref 30–400)
FOLATE SERPL-MCNC: 5.04 NG/ML (ref 4.78–24.2)
HAPTOGLOB SERPL-MCNC: 282 MG/DL (ref 30–200)
INR PPP: 1.6 (ref 0.9–1.1)
IRON 24H UR-MRATE: 34 MCG/DL (ref 59–158)
IRON SATN MFR SERPL: 9 % (ref 20–50)
LDH SERPL-CCNC: 204 U/L (ref 135–225)
PROTHROMBIN TIME: 19.7 SECONDS (ref 12.3–15.1)
RETICS # AUTO: 0.12 10*6/MM3 (ref 0.02–0.13)
RETICS/RBC NFR AUTO: 2.44 % (ref 0.7–1.9)
TIBC SERPL-MCNC: 383 MCG/DL (ref 298–536)
TRANSFERRIN SERPL-MCNC: 257 MG/DL (ref 200–360)
VIT B12 BLD-MCNC: 505 PG/ML (ref 211–946)

## 2024-05-06 PROCEDURE — 84466 ASSAY OF TRANSFERRIN: CPT

## 2024-05-06 PROCEDURE — 85610 PROTHROMBIN TIME: CPT

## 2024-05-06 PROCEDURE — 83615 LACTATE (LD) (LDH) ENZYME: CPT

## 2024-05-06 PROCEDURE — 83010 ASSAY OF HAPTOGLOBIN QUANT: CPT

## 2024-05-06 PROCEDURE — 82607 VITAMIN B-12: CPT

## 2024-05-06 PROCEDURE — 36415 COLL VENOUS BLD VENIPUNCTURE: CPT

## 2024-05-06 PROCEDURE — 82746 ASSAY OF FOLIC ACID SERUM: CPT

## 2024-05-06 PROCEDURE — 82728 ASSAY OF FERRITIN: CPT

## 2024-05-06 PROCEDURE — 85045 AUTOMATED RETICULOCYTE COUNT: CPT

## 2024-05-06 PROCEDURE — 83540 ASSAY OF IRON: CPT

## 2024-05-06 NOTE — PROGRESS NOTES
Labs show progressive anemia. Additional labs ordered to have drawn at his convenience. Please ask him about any visible bleeding such as epistaxis, gum bleeding, hematuria, melena or hematochezia.

## 2024-05-07 LAB — REF LAB TEST METHOD: NORMAL

## 2024-05-08 ENCOUNTER — LAB (OUTPATIENT)
Dept: LAB | Facility: HOSPITAL | Age: 29
End: 2024-05-08
Payer: COMMERCIAL

## 2024-05-08 DIAGNOSIS — Z79.01 LONG TERM (CURRENT) USE OF ANTICOAGULANTS: ICD-10-CM

## 2024-05-08 LAB
B2 GLYCOPROT1 IGA SER-ACNC: <9 GPI IGA UNITS (ref 0–25)
B2 GLYCOPROT1 IGG SER-ACNC: <9 GPI IGG UNITS (ref 0–20)
B2 GLYCOPROT1 IGM SER-ACNC: <9 GPI IGM UNITS (ref 0–32)
INR PPP: 1.42 (ref 0.9–1.1)
PROTHROMBIN TIME: 18 SECONDS (ref 12.3–15.1)

## 2024-05-08 PROCEDURE — 85610 PROTHROMBIN TIME: CPT

## 2024-05-08 PROCEDURE — 36415 COLL VENOUS BLD VENIPUNCTURE: CPT

## 2024-05-09 LAB — CD59 CELLS BLD QL: NORMAL

## 2024-05-10 ENCOUNTER — LAB (OUTPATIENT)
Dept: LAB | Facility: HOSPITAL | Age: 29
End: 2024-05-10
Payer: COMMERCIAL

## 2024-05-10 DIAGNOSIS — Z79.01 LONG TERM (CURRENT) USE OF ANTICOAGULANTS: ICD-10-CM

## 2024-05-10 LAB
INR PPP: 1.64 (ref 0.9–1.1)
PROTHROMBIN TIME: 20 SECONDS (ref 12.3–15.1)

## 2024-05-10 PROCEDURE — 85610 PROTHROMBIN TIME: CPT

## 2024-05-10 PROCEDURE — 36415 COLL VENOUS BLD VENIPUNCTURE: CPT

## 2024-05-13 ENCOUNTER — LAB (OUTPATIENT)
Dept: LAB | Facility: HOSPITAL | Age: 29
End: 2024-05-13
Payer: COMMERCIAL

## 2024-05-13 DIAGNOSIS — Z79.01 LONG TERM (CURRENT) USE OF ANTICOAGULANTS: ICD-10-CM

## 2024-05-13 LAB
INR PPP: 2.71 (ref 0.9–1.1)
PROTHROMBIN TIME: 29.5 SECONDS (ref 12.3–15.1)

## 2024-05-13 PROCEDURE — 36415 COLL VENOUS BLD VENIPUNCTURE: CPT

## 2024-05-13 PROCEDURE — 85610 PROTHROMBIN TIME: CPT

## 2024-05-20 ENCOUNTER — LAB (OUTPATIENT)
Dept: LAB | Facility: HOSPITAL | Age: 29
End: 2024-05-20
Payer: COMMERCIAL

## 2024-05-20 DIAGNOSIS — Z79.01 LONG TERM (CURRENT) USE OF ANTICOAGULANTS: ICD-10-CM

## 2024-05-20 LAB
INR PPP: 4.49 (ref 0.9–1.1)
PROTHROMBIN TIME: 43.5 SECONDS (ref 12.3–15.1)

## 2024-05-20 PROCEDURE — 85610 PROTHROMBIN TIME: CPT

## 2024-05-20 PROCEDURE — 36415 COLL VENOUS BLD VENIPUNCTURE: CPT

## 2024-05-21 ENCOUNTER — LAB (OUTPATIENT)
Dept: LAB | Facility: HOSPITAL | Age: 29
End: 2024-05-21
Payer: COMMERCIAL

## 2024-05-21 DIAGNOSIS — Z79.01 LONG TERM (CURRENT) USE OF ANTICOAGULANTS: ICD-10-CM

## 2024-05-21 LAB
INR PPP: 3.49 (ref 0.9–1.1)
PROTHROMBIN TIME: 35.8 SECONDS (ref 12.3–15.1)

## 2024-05-21 PROCEDURE — 85610 PROTHROMBIN TIME: CPT

## 2024-05-22 ENCOUNTER — LAB (OUTPATIENT)
Dept: LAB | Facility: HOSPITAL | Age: 29
End: 2024-05-22
Payer: COMMERCIAL

## 2024-05-22 DIAGNOSIS — Z79.01 LONG TERM (CURRENT) USE OF ANTICOAGULANTS: ICD-10-CM

## 2024-05-22 LAB
INR PPP: 2.37 (ref 0.9–1.1)
PROTHROMBIN TIME: 26.6 SECONDS (ref 12.3–15.1)

## 2024-05-22 PROCEDURE — 85610 PROTHROMBIN TIME: CPT

## 2024-05-22 PROCEDURE — 36415 COLL VENOUS BLD VENIPUNCTURE: CPT

## 2024-05-28 ENCOUNTER — LAB (OUTPATIENT)
Dept: LAB | Facility: HOSPITAL | Age: 29
End: 2024-05-28
Payer: COMMERCIAL

## 2024-05-28 DIAGNOSIS — Z79.01 LONG TERM (CURRENT) USE OF ANTICOAGULANTS: ICD-10-CM

## 2024-05-28 LAB
INR PPP: 1.47 (ref 0.9–1.1)
PROTHROMBIN TIME: 18.4 SECONDS (ref 12.3–15.1)

## 2024-05-28 PROCEDURE — 36415 COLL VENOUS BLD VENIPUNCTURE: CPT

## 2024-05-28 PROCEDURE — 85610 PROTHROMBIN TIME: CPT

## 2024-05-31 ENCOUNTER — LAB (OUTPATIENT)
Dept: LAB | Facility: HOSPITAL | Age: 29
End: 2024-05-31
Payer: COMMERCIAL

## 2024-05-31 DIAGNOSIS — Z79.01 LONG TERM (CURRENT) USE OF ANTICOAGULANTS: ICD-10-CM

## 2024-05-31 LAB
INR PPP: 1.55 (ref 0.9–1.1)
PROTHROMBIN TIME: 19.2 SECONDS (ref 12.3–15.1)

## 2024-05-31 PROCEDURE — 85610 PROTHROMBIN TIME: CPT

## 2024-05-31 PROCEDURE — 36415 COLL VENOUS BLD VENIPUNCTURE: CPT

## 2024-06-03 ENCOUNTER — LAB (OUTPATIENT)
Dept: LAB | Facility: HOSPITAL | Age: 29
End: 2024-06-03
Payer: COMMERCIAL

## 2024-06-03 DIAGNOSIS — Z79.01 LONG TERM (CURRENT) USE OF ANTICOAGULANTS: ICD-10-CM

## 2024-06-03 LAB
INR PPP: 2.43 (ref 0.9–1.1)
PROTHROMBIN TIME: 27.1 SECONDS (ref 12.3–15.1)

## 2024-06-03 PROCEDURE — 36415 COLL VENOUS BLD VENIPUNCTURE: CPT

## 2024-06-03 PROCEDURE — 85610 PROTHROMBIN TIME: CPT

## 2024-06-07 ENCOUNTER — LAB (OUTPATIENT)
Dept: LAB | Facility: HOSPITAL | Age: 29
End: 2024-06-07
Payer: COMMERCIAL

## 2024-06-07 DIAGNOSIS — Z79.01 LONG TERM (CURRENT) USE OF ANTICOAGULANTS: ICD-10-CM

## 2024-06-07 LAB
INR PPP: 3.52 (ref 0.9–1.1)
PROTHROMBIN TIME: 36 SECONDS (ref 12.3–15.1)

## 2024-06-07 PROCEDURE — 85610 PROTHROMBIN TIME: CPT

## 2024-06-07 PROCEDURE — 36415 COLL VENOUS BLD VENIPUNCTURE: CPT

## 2024-06-14 ENCOUNTER — LAB (OUTPATIENT)
Dept: LAB | Facility: HOSPITAL | Age: 29
End: 2024-06-14
Payer: COMMERCIAL

## 2024-06-14 DIAGNOSIS — Z79.01 LONG TERM (CURRENT) USE OF ANTICOAGULANTS: ICD-10-CM

## 2024-06-14 LAB
INR PPP: 4.21 (ref 0.9–1.1)
PROTHROMBIN TIME: 41.4 SECONDS (ref 12.3–15.1)

## 2024-06-14 PROCEDURE — 36415 COLL VENOUS BLD VENIPUNCTURE: CPT

## 2024-06-14 PROCEDURE — 85610 PROTHROMBIN TIME: CPT

## 2024-06-15 ENCOUNTER — LAB (OUTPATIENT)
Dept: LAB | Facility: HOSPITAL | Age: 29
End: 2024-06-15
Payer: COMMERCIAL

## 2024-06-15 DIAGNOSIS — Z79.01 LONG TERM (CURRENT) USE OF ANTICOAGULANTS: ICD-10-CM

## 2024-06-15 LAB
INR PPP: 3.34 (ref 0.9–1.1)
PROTHROMBIN TIME: 34.6 SECONDS (ref 12.3–15.1)

## 2024-06-15 PROCEDURE — 36415 COLL VENOUS BLD VENIPUNCTURE: CPT

## 2024-06-15 PROCEDURE — 85610 PROTHROMBIN TIME: CPT

## 2024-06-17 ENCOUNTER — LAB (OUTPATIENT)
Dept: LAB | Facility: HOSPITAL | Age: 29
End: 2024-06-17
Payer: COMMERCIAL

## 2024-06-17 DIAGNOSIS — Z79.01 LONG TERM (CURRENT) USE OF ANTICOAGULANTS: ICD-10-CM

## 2024-06-17 LAB
INR PPP: 2.2 (ref 0.9–1.1)
PROTHROMBIN TIME: 25.1 SECONDS (ref 12.3–15.1)

## 2024-06-17 PROCEDURE — 36415 COLL VENOUS BLD VENIPUNCTURE: CPT

## 2024-06-17 PROCEDURE — 85610 PROTHROMBIN TIME: CPT

## 2024-06-21 ENCOUNTER — LAB (OUTPATIENT)
Dept: LAB | Facility: HOSPITAL | Age: 29
End: 2024-06-21
Payer: COMMERCIAL

## 2024-06-21 DIAGNOSIS — Z79.01 LONG TERM (CURRENT) USE OF ANTICOAGULANTS: ICD-10-CM

## 2024-06-21 LAB
INR PPP: 1.7 (ref 0.9–1.1)
PROTHROMBIN TIME: 20.6 SECONDS (ref 12.3–15.1)

## 2024-06-21 PROCEDURE — 36415 COLL VENOUS BLD VENIPUNCTURE: CPT

## 2024-06-21 PROCEDURE — 85610 PROTHROMBIN TIME: CPT

## 2024-06-24 ENCOUNTER — LAB (OUTPATIENT)
Dept: LAB | Facility: HOSPITAL | Age: 29
End: 2024-06-24
Payer: COMMERCIAL

## 2024-06-24 ENCOUNTER — OFFICE VISIT (OUTPATIENT)
Dept: ONCOLOGY | Facility: CLINIC | Age: 29
End: 2024-06-24
Payer: COMMERCIAL

## 2024-06-24 VITALS
HEIGHT: 72 IN | WEIGHT: 315 LBS | RESPIRATION RATE: 20 BRPM | OXYGEN SATURATION: 98 % | HEART RATE: 88 BPM | TEMPERATURE: 97.5 F | SYSTOLIC BLOOD PRESSURE: 137 MMHG | BODY MASS INDEX: 42.66 KG/M2 | DIASTOLIC BLOOD PRESSURE: 65 MMHG

## 2024-06-24 DIAGNOSIS — Z79.01 LONG TERM (CURRENT) USE OF ANTICOAGULANTS: ICD-10-CM

## 2024-06-24 DIAGNOSIS — I26.99 OTHER ACUTE PULMONARY EMBOLISM, UNSPECIFIED WHETHER ACUTE COR PULMONALE PRESENT: Primary | ICD-10-CM

## 2024-06-24 DIAGNOSIS — I26.99 OTHER ACUTE PULMONARY EMBOLISM, UNSPECIFIED WHETHER ACUTE COR PULMONALE PRESENT: ICD-10-CM

## 2024-06-24 LAB
ALBUMIN SERPL-MCNC: 3.8 G/DL (ref 3.5–5.2)
ALBUMIN/GLOB SERPL: 1.2 G/DL
ALP SERPL-CCNC: 94 U/L (ref 39–117)
ALT SERPL W P-5'-P-CCNC: 21 U/L (ref 1–41)
ANION GAP SERPL CALCULATED.3IONS-SCNC: 9.9 MMOL/L (ref 5–15)
AST SERPL-CCNC: 28 U/L (ref 1–40)
BASOPHILS # BLD AUTO: 0.02 10*3/MM3 (ref 0–0.2)
BASOPHILS NFR BLD AUTO: 0.2 % (ref 0–1.5)
BILIRUB SERPL-MCNC: 0.3 MG/DL (ref 0–1.2)
BUN SERPL-MCNC: 13 MG/DL (ref 6–20)
BUN/CREAT SERPL: 16 (ref 7–25)
CALCIUM SPEC-SCNC: 9.2 MG/DL (ref 8.6–10.5)
CHLORIDE SERPL-SCNC: 99 MMOL/L (ref 98–107)
CO2 SERPL-SCNC: 26.1 MMOL/L (ref 22–29)
CREAT SERPL-MCNC: 0.81 MG/DL (ref 0.76–1.27)
DEPRECATED RDW RBC AUTO: 45.7 FL (ref 37–54)
EGFRCR SERPLBLD CKD-EPI 2021: 123.2 ML/MIN/1.73
EOSINOPHIL # BLD AUTO: 0.16 10*3/MM3 (ref 0–0.4)
EOSINOPHIL NFR BLD AUTO: 1.7 % (ref 0.3–6.2)
ERYTHROCYTE [DISTWIDTH] IN BLOOD BY AUTOMATED COUNT: 17.8 % (ref 12.3–15.4)
FERRITIN SERPL-MCNC: 65.36 NG/ML (ref 30–400)
GLOBULIN UR ELPH-MCNC: 3.1 GM/DL
GLUCOSE SERPL-MCNC: 109 MG/DL (ref 65–99)
HCT VFR BLD AUTO: 35.6 % (ref 37.5–51)
HGB BLD-MCNC: 10.8 G/DL (ref 13–17.7)
IMM GRANULOCYTES # BLD AUTO: 0.06 10*3/MM3 (ref 0–0.05)
IMM GRANULOCYTES NFR BLD AUTO: 0.6 % (ref 0–0.5)
INR PPP: 1.62 (ref 0.9–1.1)
IRON 24H UR-MRATE: 29 MCG/DL (ref 59–158)
IRON SATN MFR SERPL: 7 % (ref 20–50)
LYMPHOCYTES # BLD AUTO: 1.74 10*3/MM3 (ref 0.7–3.1)
LYMPHOCYTES NFR BLD AUTO: 18.5 % (ref 19.6–45.3)
MCH RBC QN AUTO: 21.8 PG (ref 26.6–33)
MCHC RBC AUTO-ENTMCNC: 30.3 G/DL (ref 31.5–35.7)
MCV RBC AUTO: 71.8 FL (ref 79–97)
MICROCYTES BLD QL: NORMAL
MONOCYTES # BLD AUTO: 0.61 10*3/MM3 (ref 0.1–0.9)
MONOCYTES NFR BLD AUTO: 6.5 % (ref 5–12)
NEUTROPHILS NFR BLD AUTO: 6.82 10*3/MM3 (ref 1.7–7)
NEUTROPHILS NFR BLD AUTO: 72.5 % (ref 42.7–76)
NRBC BLD AUTO-RTO: 0 /100 WBC (ref 0–0.2)
PLAT MORPH BLD: NORMAL
PLATELET # BLD AUTO: 291 10*3/MM3 (ref 140–450)
PMV BLD AUTO: 10.4 FL (ref 6–12)
POTASSIUM SERPL-SCNC: 4.2 MMOL/L (ref 3.5–5.2)
PROT SERPL-MCNC: 6.9 G/DL (ref 6–8.5)
PROTHROMBIN TIME: 19.9 SECONDS (ref 12.3–15.1)
RBC # BLD AUTO: 4.96 10*6/MM3 (ref 4.14–5.8)
SODIUM SERPL-SCNC: 135 MMOL/L (ref 136–145)
TIBC SERPL-MCNC: 390 MCG/DL (ref 298–536)
TRANSFERRIN SERPL-MCNC: 262 MG/DL (ref 200–360)
WBC MORPH BLD: NORMAL
WBC NRBC COR # BLD AUTO: 9.41 10*3/MM3 (ref 3.4–10.8)

## 2024-06-24 PROCEDURE — 85025 COMPLETE CBC W/AUTO DIFF WBC: CPT

## 2024-06-24 PROCEDURE — 84466 ASSAY OF TRANSFERRIN: CPT

## 2024-06-24 PROCEDURE — 99214 OFFICE O/P EST MOD 30 MIN: CPT | Performed by: INTERNAL MEDICINE

## 2024-06-24 PROCEDURE — 3075F SYST BP GE 130 - 139MM HG: CPT | Performed by: INTERNAL MEDICINE

## 2024-06-24 PROCEDURE — 1126F AMNT PAIN NOTED NONE PRSNT: CPT | Performed by: INTERNAL MEDICINE

## 2024-06-24 PROCEDURE — 80053 COMPREHEN METABOLIC PANEL: CPT

## 2024-06-24 PROCEDURE — 82728 ASSAY OF FERRITIN: CPT

## 2024-06-24 PROCEDURE — 83540 ASSAY OF IRON: CPT

## 2024-06-24 PROCEDURE — 85007 BL SMEAR W/DIFF WBC COUNT: CPT

## 2024-06-24 PROCEDURE — 3078F DIAST BP <80 MM HG: CPT | Performed by: INTERNAL MEDICINE

## 2024-06-24 PROCEDURE — 36415 COLL VENOUS BLD VENIPUNCTURE: CPT

## 2024-06-24 PROCEDURE — 85610 PROTHROMBIN TIME: CPT

## 2024-06-24 NOTE — PROGRESS NOTES
Hematology and Oncology [facility]  Office number [phone]    Fax number [phone]    Follow up      Date: 24     Patient Name: Jesús Mijares  MRN: [id number]  : 1995    Referred by: IRIS Ro    Chief Complaint: Bilateral PE    History of Present Illness: Jesús Mijares is a pleasant 28 y.o. male who presents today for evaluation of bilateral PE    The patient presented with progressive dyspnea and underwent CT of the chest to the ER 3/4/24 demonstrating  large burden of bilateral acute pulmonary embolus. Right heart strain.Right upper lobe and left lower lobe patchy lung opacities. Consider multifocal pneumonia.  Hospital notes mention being tested for RSV on 24, but he does not specifically recall that and his respiratory panel PCR from 3/3/2024 was negative. No preceding surgeries, travel. Works at [employer], on feet 8 hours per day for this job.    He was assessed by cardiology, but no thrombectomy was recommended.  He was recommended for Coumadin over DOAC given that Body mass index is 77.89 kg/m².  .   Reports his most recent INR was 4 and coumadin held 2 days, having labs redrawn today for PCP. Prior to holding he was on 8 mg daily. Unaware of dietary restrictions with Coumadin but states that he rarely eats fruits or vegetables.    Interval history:   He presents today for follow-up.  Supportive family members joined by phone.  His PCP has been monitoring his Coumadin, but he is having a difficult time remaining in the therapeutic range.  He reports he has not been following a vitamin K consistent diet.  He denies any specific bleeding symptoms.  His breathing is stable, not specifically improved or worsened.  No chest pain.  He does have a follow-up with nutrition pending in July.      Past Medical History:   Past Medical History:   Diagnosis Date    Asthma, intrinsic     Bronchitis     Hypertension     Pulmonary embolism 2024       Past  Surgical History:   Past Surgical History:   Procedure Laterality Date    TONSILECTOMY, ADENOIDECTOMY, BILATERAL MYRINGOTOMY AND TUBES Bilateral        Family History:   Family History   Problem Relation Age of Onset    Hypothyroidism Mother     Osteoarthritis Mother     Hypertension Mother     Depression Mother     Anxiety disorder Mother     Obesity Mother     Obesity Father     Depression Father     Hypertension Father     Diabetes Father     Hypertension Brother     No Known Problems Maternal Grandmother     Hypertension Other    Grandmother  from blood clots at age 65. No other family of blood clots or sudden death.   Reportedly his grandmother had 4 types of breast cancer at age 16, 32, 47.  Aunt with thyroid cancer at age 43.  Aunt with melanoma at age 41.    Social History:   Social History     Socioeconomic History    Marital status: Single   Tobacco Use    Smoking status: Former     Current packs/day: 0.00     Types: Cigarettes     Quit date:      Years since quittin.4    Smokeless tobacco: Never    Tobacco comments:     patient smoke 3 months   Vaping Use    Vaping status: Never Used   Substance and Sexual Activity    Alcohol use: No    Drug use: No    Sexual activity: Defer       Medications:     Current Outpatient Medications:     albuterol sulfate  (90 Base) MCG/ACT inhaler, Inhale 2 puffs Every 4 (Four) Hours As Needed for Wheezing., Disp: 18 g, Rfl: 0    amLODIPine (NORVASC) 5 MG tablet, Take 1 tablet by mouth Daily., Disp: 90 tablet, Rfl: 3    azelastine (ASTELIN) 0.1 % nasal spray, 2 sprays into the nostril(s) as directed by provider 2 (Two) Times a Day. Use in each nostril as directed, Disp: , Rfl:     carvedilol (COREG) 25 MG tablet, Take 1 tablet by mouth 2 (Two) Times a Day With Meals., Disp: 180 tablet, Rfl: 3    lisinopril-hydrochlorothiazide (PRINZIDE,ZESTORETIC) 20-12.5 MG per tablet, Take 1 tablet by mouth Daily. , Disp: 90 tablet, Rfl: 3    triamcinolone  "(KENALOG) 0.025 % cream, APPLY TOPICALLY TO THE LEFT THIGH TWICE DAILY, Disp: , Rfl:     warfarin (COUMADIN) 2.5 MG tablet, Take 1 tablet by mouth every evening, in additon to 4 mg tablet (6.5 mg daily), until follow up with PCP, Disp: 6 tablet, Rfl: 0    warfarin (COUMADIN) 4 MG tablet, Take 1 tablet by mouth every evening, in addition to 2.5 mg tablet (6.5 mg daily),  until follow up with PCP, Disp: 6 tablet, Rfl: 0    Allergies:   Allergies   Allergen Reactions    Cherry Flavor Hives    Lorabid [Loracarbef] Hives       Objective     Vital Signs:   Vitals:    06/24/24 1516   BP: 137/65   Pulse: 88   Resp: 20   Temp: 97.5 °F (36.4 °C)   SpO2: 98%   Weight: (!) 261 kg (574 lb 6.4 oz)   Height: 182.9 cm (72.01\")   PainSc: 0-No pain    Body mass index is 77.89 kg/m².   Pain Score    06/24/24 1516   PainSc: 0-No pain       Physical Exam:   General: Morbidly obese no acute distress.HEENT: Normocephalic, atraumatic. Sclera anicteric.   Neck: supple, no adenopathy.   Cardiovascular: regular rate and rhythm. No murmurs.   Respiratory: Normal rate. Clear to auscultation bilaterally.  Abdomen: Soft, nontender, non distended with normoactive bowel sounds. No hepatosplenomegaly  Lymph: no cervical, supraclavicular or axillary adenopathy.  Neuro: Alert and oriented x 3. No focal deficits.   Ext: Symmetric, no swelling.   Psych: Euthymic      Laboratory/Imaging Reviewed:   Lab on 06/24/2024   Component Date Value Ref Range Status    Protime 06/24/2024 19.9 (H)  12.3 - 15.1 Seconds Final    INR 06/24/2024 1.62 (H)  0.90 - 1.10 Final   Lab on 06/21/2024   Component Date Value Ref Range Status    Protime 06/21/2024 20.6 (H)  12.3 - 15.1 Seconds Final    INR 06/21/2024 1.70 (H)  0.90 - 1.10 Final   Lab on 06/17/2024   Component Date Value Ref Range Status    Protime 06/17/2024 25.1 (H)  12.3 - 15.1 Seconds Final    INR 06/17/2024 2.20 (H)  0.90 - 1.10 Final   Lab on 06/15/2024   Component Date Value Ref Range Status    Protime " 06/15/2024 34.6 (H)  12.3 - 15.1 Seconds Final    INR 06/15/2024 3.34 (H)  0.90 - 1.10 Final   Lab on 06/14/2024   Component Date Value Ref Range Status    Protime 06/14/2024 41.4 (H)  12.3 - 15.1 Seconds Final    INR 06/14/2024 4.21 (C)  0.90 - 1.10 Final       Component      Latest Ref Rng 5/20/2024 5/21/2024 5/22/2024 5/28/2024 5/31/2024 6/3/2024 6/7/2024   Protime      12.3 - 15.1 Seconds 43.5 (H)  35.8 (H)  26.6 (H)  18.4 (H)  19.2 (H)  27.1 (H)  36.0 (H)    INR      0.90 - 1.10  4.49 (HH)  3.49 (H)  2.37 (H)  1.47 (H)  1.55 (H)  2.43 (H)  3.52 (H)      Component      Latest Ref Rng 6/14/2024 6/15/2024 6/17/2024 6/21/2024 6/24/2024   Protime      12.3 - 15.1 Seconds 41.4 (H)  34.6 (H)  25.1 (H)  20.6 (H)  19.9 (H)    INR      0.90 - 1.10  4.21 (HH)  3.34 (H)  2.20 (H)  1.70 (H)  1.62 (H)      No results found.    Assessment / Plan      Assessment/Plan:     1. Other acute pulmonary embolism, unspecified whether acute cor pulmonale present  2. Body mass index is 77.89 kg/m².    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  There is a family history of DVT, but this occurred later in life in a grandparent.  -He has no specific history of high risk of travel or preceding illness/immobility.  Although his BMI is elevated, he reports that he stands for 8 hours a day and to work as a .  -I agree with Coumadin given his elevated BMI.   -He should continue with close INR monitoring.  I reinforced a vitamin K consistent diet.  He has been referred to nutrition and consultation is pending with them soon.  We discussed the option of referral to the Coumadin clinic for pharmacist assessment with Coumadin management if desired by his PCP.  -His hypercoagulable workup to date has been limited by concurrent anticoagulation.  We reviewed available workup today.  This is notable for negative beta-2 glycoprotein antibodies and anticardiolipin IgM and IgG.  Prothrombin gene mutation and factor V Leiden  mutation were not identified.  PNH was negative.  -I am going to obtain a repeat chest CT given that he has been outside of the therapeutic range and continues to experience stable dyspnea on exertion to reevaluate his clot burden.  In addition given his unexplained anemia and recent unprovoked thrombus I am going to proceed with an abdominal pelvis CT.  He does have a strong family history of early onset malignancy.    3.  Strong family history of malignancy including multiple family members with early onset malignancy  -Ultimately, I would recommend consideration of genetic testing  -He wishes to defer for now.    4.  Anemia  -Workup notable for low iron saturation with a ferritin of 70.  B12 and folate are normal.  -Repeat iron studies showed persistently low iron saturation and microcytic changes.  Going to obtain fecal occult blood testing.  If his clot burden is sufficiently reduced to hold Coumadin we will consider diagnostic workup with GI.    Orders Placed This Encounter   Procedures    CT Angiogram Chest    CT Abdomen Pelvis With Contrast    Occult Blood X 3, Stool - Stool, Per Rectum    Iron Profile    Ferritin    Comprehensive Metabolic Panel    CBC & Differential      Follow Up:   1 mo     Yolanda Leon MD  Hematology and Oncology

## 2024-06-26 ENCOUNTER — TELEPHONE (OUTPATIENT)
Dept: PULMONOLOGY | Facility: CLINIC | Age: 29
End: 2024-06-26

## 2024-06-26 NOTE — TELEPHONE ENCOUNTER
"Name: Jesús Mijares \"Ryne\"    Relationship: Self    Best Callback Number: [phone]     Patient would like to Reschedule their appointment. Unable to schedule within the 3 week timeframe.       "

## 2024-06-27 ENCOUNTER — LAB (OUTPATIENT)
Dept: LAB | Facility: HOSPITAL | Age: 29
End: 2024-06-27
Payer: COMMERCIAL

## 2024-06-27 DIAGNOSIS — I26.99 OTHER ACUTE PULMONARY EMBOLISM, UNSPECIFIED WHETHER ACUTE COR PULMONALE PRESENT: ICD-10-CM

## 2024-06-27 DIAGNOSIS — Z79.01 LONG TERM (CURRENT) USE OF ANTICOAGULANTS: ICD-10-CM

## 2024-06-27 LAB
INR PPP: 1.84 (ref 0.9–1.1)
PROTHROMBIN TIME: 21.9 SECONDS (ref 12.3–15.1)

## 2024-06-27 PROCEDURE — 36415 COLL VENOUS BLD VENIPUNCTURE: CPT

## 2024-06-27 PROCEDURE — 85610 PROTHROMBIN TIME: CPT

## 2024-06-28 LAB — HEMOCCULT STL QL: NEGATIVE

## 2024-06-28 PROCEDURE — 82270 OCCULT BLOOD FECES: CPT

## 2024-07-02 ENCOUNTER — TELEPHONE (OUTPATIENT)
Dept: ONCOLOGY | Facility: CLINIC | Age: 29
End: 2024-07-02
Payer: COMMERCIAL

## 2024-07-02 ENCOUNTER — LAB (OUTPATIENT)
Dept: LAB | Facility: HOSPITAL | Age: 29
End: 2024-07-02
Payer: COMMERCIAL

## 2024-07-02 DIAGNOSIS — Z79.01 LONG TERM (CURRENT) USE OF ANTICOAGULANTS: ICD-10-CM

## 2024-07-02 LAB
INR PPP: 2.15 (ref 0.9–1.1)
PROTHROMBIN TIME: 24.7 SECONDS (ref 12.3–15.1)

## 2024-07-02 PROCEDURE — 36415 COLL VENOUS BLD VENIPUNCTURE: CPT

## 2024-07-02 PROCEDURE — 85610 PROTHROMBIN TIME: CPT

## 2024-07-02 NOTE — TELEPHONE ENCOUNTER
Caller:  CLEARANCE    Relationship: EMERITA    Best call back number: [phone]    Who are you requesting to speak with (clinical staff, provider,  specific staff member): CLINICAL    What was the call regarding: EMERITA IS CALLING FROM  F/C THE CT SCAN SCHEDULED FOR 7-10 HAS BEEN DENIED, REQUESTING A PEER TO PEER, DENIAL REASON AND PEER TO PEER INFO HAS BEEN PLACED IN MEDIA   REQUESTING PEER TO PEER BY 7-5-2024

## 2024-07-02 NOTE — TELEPHONE ENCOUNTER
Discussed with Dr. Leon and we will cancel the CT abdomen and pelvis, she reviewed the denial and will not be able to appeal that decision. She would like him to keep the appt for the CTA chesst but cancel the CT abdomen and pelvis due to insurance denial. Called and spoke with patients mother, she verbalized understanding. Called and left a VM with Kiah informing her the CT A/P will be cancelled. Sent in basket message to Everardo, referral coordinator to cancel.

## 2024-07-05 ENCOUNTER — LAB (OUTPATIENT)
Dept: LAB | Facility: HOSPITAL | Age: 29
End: 2024-07-05
Payer: COMMERCIAL

## 2024-07-05 DIAGNOSIS — Z79.01 LONG TERM (CURRENT) USE OF ANTICOAGULANTS: ICD-10-CM

## 2024-07-05 LAB
INR PPP: 2.4 (ref 0.9–1.1)
PROTHROMBIN TIME: 26.9 SECONDS (ref 12.3–15.1)

## 2024-07-05 PROCEDURE — 85610 PROTHROMBIN TIME: CPT

## 2024-07-05 PROCEDURE — 36415 COLL VENOUS BLD VENIPUNCTURE: CPT

## 2024-07-10 ENCOUNTER — APPOINTMENT (OUTPATIENT)
Dept: CT IMAGING | Facility: HOSPITAL | Age: 29
End: 2024-07-10
Payer: COMMERCIAL

## 2024-07-10 ENCOUNTER — HOSPITAL ENCOUNTER (OUTPATIENT)
Dept: NUTRITION | Facility: HOSPITAL | Age: 29
Discharge: HOME | End: 2024-07-10
Admitting: INTERNAL MEDICINE
Payer: COMMERCIAL

## 2024-07-10 PROCEDURE — 97802 MEDICAL NUTRITION INDIV IN: CPT

## 2024-07-11 NOTE — PROGRESS NOTES
"Adult Outpatient Nutrition  Assessment    Patient Name:  Jesús Mijares  YOB: 1995  MRN: [id number]    Assessment Date:  7/11/2024    Comments: Pt referred for Vitamin K consistent diet education d/t being on Coumadin for pulmonary embolism. Pt is unsure on CBW and Ht. Last measurements on file wt 574#, Ht 72\", BMI 77.89. Pt reports eating out and high caloric dense foods/low nutrient content at home. He does not currently exercise but use to walk to work and home. He does not report any issues limiting ability to be able to exercise or walk. Pt does not eat many fruits or vegetables. Drinks 6-12 cans of regular pop/day. He endorses wanting to lose some wt.     RD reviewed consistent Vitamin K diet and heart healthy diet. Pt does not eat any fruits or vegetables currently on list. Discussed importance of eating fruits and vegetables to help with wt loss and how to incorporate them into diet slowly and how to plan vitamin K foods consistently with heart heathy diet. Encouraged drinking more water. RD and pt came up with short (weekly) and long term goals to help improve diet and to promote wt loss. Week 1: switch to diet soda (6-7 cans/day), 3 days walking for 15 minutes. Week 2: 4 cans of diet soda/day), 3 days walking for 20 minutes. Week 3: 2 cans of diet soda/day, 3 days walking 25 minutes/day. Week 4: 1 can of diet soda, 3 days walking 30 minutes. Long term goal: 1-2 diet pops/week, walking 30 minutes 5x/week, eating out only 1 time/week at least. Discussed ways can continue to add onto goals until f/up appointment. Pt did not have any questions/concerns at end of appointment. MARGARET scheduled f/up for August 23rd at 10:30 am.     Electronically signed by:  Fozia Houser RD  07/11/24 09:01 EDT     "

## 2024-07-12 ENCOUNTER — TELEPHONE (OUTPATIENT)
Dept: ONCOLOGY | Facility: CLINIC | Age: 29
End: 2024-07-12

## 2024-07-12 ENCOUNTER — LAB (OUTPATIENT)
Dept: LAB | Facility: HOSPITAL | Age: 29
End: 2024-07-12
Payer: COMMERCIAL

## 2024-07-12 DIAGNOSIS — Z79.01 LONG TERM (CURRENT) USE OF ANTICOAGULANTS: ICD-10-CM

## 2024-07-12 LAB
INR PPP: 2.95 (ref 0.9–1.1)
PROTHROMBIN TIME: 31.5 SECONDS (ref 12.3–15.1)

## 2024-07-12 PROCEDURE — 36415 COLL VENOUS BLD VENIPUNCTURE: CPT

## 2024-07-12 PROCEDURE — 85610 PROTHROMBIN TIME: CPT

## 2024-07-12 NOTE — TELEPHONE ENCOUNTER
"  Caller: Jesús Mijares \"Ryne\"    Relationship: Self    Best call back number: [phone]    What is the best time to reach you: ANY    Who are you requesting to speak with (clinical staff, provider,  specific staff member): CLINICAL       What was the call regarding: RYNE HAS A CT SCHEDULED FOR 8-13, HE HAS AN APPOINTMENT WITH DR ROYAL ON MONDAY 7-15    RYNE IS WANTING TO KNOW IF HIS APPOINTMENT WITH DR ROYAL SHOULD BE PUSHED BACK UNTIL AFTER THE CT         PLEASE ADVISE       "

## 2024-07-20 ENCOUNTER — LAB (OUTPATIENT)
Dept: LAB | Facility: HOSPITAL | Age: 29
End: 2024-07-20
Payer: COMMERCIAL

## 2024-07-20 DIAGNOSIS — Z79.01 LONG TERM (CURRENT) USE OF ANTICOAGULANTS: ICD-10-CM

## 2024-07-20 LAB
INR PPP: 3.61 (ref 0.9–1.1)
PROTHROMBIN TIME: 36.8 SECONDS (ref 12.3–15.1)

## 2024-07-20 PROCEDURE — 36415 COLL VENOUS BLD VENIPUNCTURE: CPT

## 2024-07-20 PROCEDURE — 85610 PROTHROMBIN TIME: CPT

## 2024-07-22 ENCOUNTER — LAB (OUTPATIENT)
Dept: LAB | Facility: HOSPITAL | Age: 29
End: 2024-07-22
Payer: COMMERCIAL

## 2024-07-22 DIAGNOSIS — Z79.01 LONG TERM (CURRENT) USE OF ANTICOAGULANTS: ICD-10-CM

## 2024-07-22 LAB
INR PPP: 2.53 (ref 0.9–1.1)
PROTHROMBIN TIME: 28 SECONDS (ref 12.3–15.1)

## 2024-07-22 PROCEDURE — 36415 COLL VENOUS BLD VENIPUNCTURE: CPT

## 2024-07-22 PROCEDURE — 85610 PROTHROMBIN TIME: CPT

## 2024-07-26 ENCOUNTER — LAB (OUTPATIENT)
Dept: LAB | Facility: HOSPITAL | Age: 29
End: 2024-07-26
Payer: COMMERCIAL

## 2024-07-26 DIAGNOSIS — Z79.01 LONG TERM (CURRENT) USE OF ANTICOAGULANTS: ICD-10-CM

## 2024-07-26 LAB
INR PPP: 2.29 (ref 0.9–1.1)
PROTHROMBIN TIME: 25.9 SECONDS (ref 12.3–15.1)

## 2024-07-26 PROCEDURE — 36415 COLL VENOUS BLD VENIPUNCTURE: CPT

## 2024-07-26 PROCEDURE — 85610 PROTHROMBIN TIME: CPT

## 2024-08-01 ENCOUNTER — LAB (OUTPATIENT)
Dept: LAB | Facility: HOSPITAL | Age: 29
End: 2024-08-01
Payer: COMMERCIAL

## 2024-08-01 DIAGNOSIS — Z79.01 LONG TERM (CURRENT) USE OF ANTICOAGULANTS: ICD-10-CM

## 2024-08-01 LAB
INR PPP: 2.16 (ref 0.9–1.1)
PROTHROMBIN TIME: 24.5 SECONDS (ref 12.3–15.1)

## 2024-08-01 PROCEDURE — 85610 PROTHROMBIN TIME: CPT

## 2024-08-01 PROCEDURE — 36415 COLL VENOUS BLD VENIPUNCTURE: CPT

## 2024-08-13 ENCOUNTER — HOSPITAL ENCOUNTER (OUTPATIENT)
Dept: CT IMAGING | Facility: HOSPITAL | Age: 29
Discharge: HOME | End: 2024-08-13
Admitting: INTERNAL MEDICINE
Payer: COMMERCIAL

## 2024-08-13 DIAGNOSIS — I26.99 OTHER ACUTE PULMONARY EMBOLISM, UNSPECIFIED WHETHER ACUTE COR PULMONALE PRESENT: ICD-10-CM

## 2024-08-13 PROCEDURE — 71275 CT ANGIOGRAPHY CHEST: CPT

## 2024-08-13 PROCEDURE — 25510000001 IOPAMIDOL 61 % SOLUTION: Performed by: INTERNAL MEDICINE

## 2024-08-13 RX ADMIN — IOPAMIDOL 100 ML: 612 INJECTION, SOLUTION INTRAVENOUS at 09:26

## 2024-08-14 ENCOUNTER — OFFICE VISIT (OUTPATIENT)
Dept: PULMONOLOGY | Facility: CLINIC | Age: 29
End: 2024-08-14
Payer: COMMERCIAL

## 2024-08-14 VITALS
DIASTOLIC BLOOD PRESSURE: 78 MMHG | HEIGHT: 72 IN | HEART RATE: 74 BPM | BODY MASS INDEX: 42.66 KG/M2 | RESPIRATION RATE: 18 BRPM | WEIGHT: 315 LBS | OXYGEN SATURATION: 99 % | SYSTOLIC BLOOD PRESSURE: 132 MMHG

## 2024-08-14 DIAGNOSIS — J30.89 OTHER ALLERGIC RHINITIS: ICD-10-CM

## 2024-08-14 DIAGNOSIS — I26.94 MULTIPLE SUBSEGMENTAL PULMONARY EMBOLI WITHOUT ACUTE COR PULMONALE: ICD-10-CM

## 2024-08-14 DIAGNOSIS — E66.01 MORBID OBESITY, UNSPECIFIED OBESITY TYPE: ICD-10-CM

## 2024-08-14 DIAGNOSIS — G47.33 OSA (OBSTRUCTIVE SLEEP APNEA): Primary | ICD-10-CM

## 2024-08-14 PROCEDURE — 3078F DIAST BP <80 MM HG: CPT | Performed by: INTERNAL MEDICINE

## 2024-08-14 PROCEDURE — 99214 OFFICE O/P EST MOD 30 MIN: CPT | Performed by: INTERNAL MEDICINE

## 2024-08-14 PROCEDURE — 3075F SYST BP GE 130 - 139MM HG: CPT | Performed by: INTERNAL MEDICINE

## 2024-08-14 RX ORDER — AZELASTINE 1 MG/ML
1 SPRAY, METERED NASAL 2 TIMES DAILY
Qty: 30 ML | Refills: 5 | Status: SHIPPED | OUTPATIENT
Start: 2024-08-14

## 2024-08-14 NOTE — PROGRESS NOTES
"  Chief Complaint   Patient presents with    Sleeping Problem    Follow-up         Subjective   Jesús Mijares is a 28 y.o. male.   Patient was evaluated today for follow up of Sleep apnea. Patient did report improvement when he was able to use the CPAP for some time, but now feels that he is tired again.    he is not sure why he stopped using CPAP, but after he did, the device was picked up due to non compliance.     Patient reports significant worsening in daytime sleepiness when compared to before.     He is being followed by Dr. Leon for PE.     Patient says that he has been using his nasal sprays on a seasonal basis and describes no significant ongoing issues other than occasional congestion.       The following portions of the patient's history were reviewed and updated as appropriate: allergies, current medications, past family history, past medical history, past social history, and past surgical history.    Review of Systems   HENT:  Negative for sinus pressure, sneezing and sore throat.    Respiratory:  Negative for cough, chest tightness, shortness of breath and wheezing.    Psychiatric/Behavioral:  Positive for sleep disturbance.        Objective   Visit Vitals  /78   Pulse 74   Resp 18   Ht 182.9 cm (72.01\") Comment: pt reported   Wt (!) 263 kg (579 lb 9.6 oz)   SpO2 99%   BMI 78.59 kg/m²       BMI Readings from Last 8 Encounters:   08/14/24 78.59 kg/m²   06/24/24 77.89 kg/m²   04/29/24 76.52 kg/m²   03/22/24 78.66 kg/m²   03/14/24 74.24 kg/m²   03/03/24 58.71 kg/m²   11/17/23 73.06 kg/m²   09/07/23 71.20 kg/m²       Physical Exam  Vitals reviewed.   Constitutional:       Appearance: He is well-developed.   HENT:      Head: Atraumatic.      Nose:      Comments: Nasal erythema noted.     Mouth/Throat:      Comments: Oropharynx was crowded.  Neck:      Comments: Increased neck circumference noted.  Musculoskeletal:      Comments: Gait was normal.   Neurological:      Mental Status: He is " alert and oriented to person, place, and time.         Assessment & Plan   Diagnoses and all orders for this visit:    1. LORRAINE (obstructive sleep apnea) (Primary)  -     Polysomnography 4 or More Parameters With CPAP; Future    2. Morbid obesity, unspecified obesity type  -     Polysomnography 4 or More Parameters With CPAP; Future    3. Multiple subsegmental pulmonary emboli without acute cor pulmonale    4. Other allergic rhinitis    Other orders  -     azelastine (ASTELIN) 0.1 % nasal spray; 1 spray into the nostril(s) as directed by provider 2 (Two) Times a Day. Use in each nostril as directed  Dispense: 30 mL; Refill: 5           Return in about 3 months (around 11/14/2024) for Recheck, Sleep study, For Liz Lopez), ...Also 7 mths w/Tristan Aguilar.    DISCUSSION (if any):  Last CT scan results was reviewed in great detail with the patient.  Results for orders placed during the hospital encounter of 08/13/24    CT Angiogram Chest    Narrative  PROCEDURE: CT ANGIOGRAM CHEST-    HISTORY: blood clot evaluation; I26.99-Other pulmonary embolism without  acute cor pulmonale    COMPARISON: 03/03/2024    TECHNIQUE: Thin section axial CT with IV contrast supplemented with 3D  reconstructed MIP images.    FINDINGS:    Assessment of peripheral pulmonary branches is suboptimal. However given  this limitation, there is some residual embolic disease involving right  lower lobe segmental and subsegmental branches although improved. Left  lower lobe filling defects have largely resolved. No upper lobe  pulmonary embolism is present. There is no evidence of right heart  strain.    Thoracic aorta is normal in caliber without dissection.    Moderate dependent atelectasis of the lower lobes is present. Left lower  lobe infiltrate has resolved.  No pleural or pericardial effusion is  seen. No adenopathy or mass lesion is present.    Fatty infiltration of liver is seen. Gynecomastia is noted.    Impression  Small pulmonary  embolism involving right lower lobe  segmental and subsegmental branches improved from prior exam. Grossly  resolved embolic disease involving left lung from prior exam.    This study was performed with techniques to keep radiation doses as low  as reasonably achievable (ALARA). Individualized dose reduction  techniques using automated exposure control or adjustment of vA and/or  kV according to the patient size were employed.    This report was signed and finalized on 8/13/2024 12:23 PM by Raj Yee MD.    Sleep study performed in Oct 2023  AHI was 16 / hour.     I told the patient that his symptoms are consistent with poorly controlled sleep apnea.    Since the patient is having significant issues, the only realistic option is for him to undergo a full night titration study.    Patient was advised to continue using PAP for at least 4 hours every night.    Patient was advised to call this office with any issues.    He was asked to continue to follow up with Hematology for PE.     Patient was advised to continue his nasal spray on a seasonal basis, since his symptoms are consistent with seasonal allergic rhinitis.      Dictated utilizing Dragon dictation.    This document was electronically signed by Steven Bond MD on 08/14/24 at 10:59 EDT

## 2024-08-19 ENCOUNTER — LAB (OUTPATIENT)
Dept: LAB | Facility: HOSPITAL | Age: 29
End: 2024-08-19
Payer: COMMERCIAL

## 2024-08-19 ENCOUNTER — OFFICE VISIT (OUTPATIENT)
Dept: ONCOLOGY | Facility: CLINIC | Age: 29
End: 2024-08-19
Payer: COMMERCIAL

## 2024-08-19 VITALS
DIASTOLIC BLOOD PRESSURE: 74 MMHG | BODY MASS INDEX: 42.66 KG/M2 | OXYGEN SATURATION: 98 % | HEIGHT: 72 IN | TEMPERATURE: 97.7 F | WEIGHT: 315 LBS | RESPIRATION RATE: 18 BRPM | HEART RATE: 88 BPM | SYSTOLIC BLOOD PRESSURE: 138 MMHG

## 2024-08-19 DIAGNOSIS — Z79.01 LONG TERM (CURRENT) USE OF ANTICOAGULANTS: ICD-10-CM

## 2024-08-19 DIAGNOSIS — I26.99 OTHER ACUTE PULMONARY EMBOLISM, UNSPECIFIED WHETHER ACUTE COR PULMONALE PRESENT: Primary | ICD-10-CM

## 2024-08-19 DIAGNOSIS — I26.99 OTHER ACUTE PULMONARY EMBOLISM, UNSPECIFIED WHETHER ACUTE COR PULMONALE PRESENT: ICD-10-CM

## 2024-08-19 LAB
ALBUMIN SERPL-MCNC: 3.9 G/DL (ref 3.5–5.2)
ALBUMIN/GLOB SERPL: 1.3 G/DL
ALP SERPL-CCNC: 97 U/L (ref 39–117)
ALT SERPL W P-5'-P-CCNC: 21 U/L (ref 1–41)
ANION GAP SERPL CALCULATED.3IONS-SCNC: 8.1 MMOL/L (ref 5–15)
AST SERPL-CCNC: 22 U/L (ref 1–40)
BASOPHILS # BLD AUTO: 0.03 10*3/MM3 (ref 0–0.2)
BASOPHILS NFR BLD AUTO: 0.4 % (ref 0–1.5)
BILIRUB SERPL-MCNC: 0.4 MG/DL (ref 0–1.2)
BUN SERPL-MCNC: 14 MG/DL (ref 6–20)
BUN/CREAT SERPL: 18.2 (ref 7–25)
CALCIUM SPEC-SCNC: 9.7 MG/DL (ref 8.6–10.5)
CHLORIDE SERPL-SCNC: 101 MMOL/L (ref 98–107)
CO2 SERPL-SCNC: 26.9 MMOL/L (ref 22–29)
CREAT SERPL-MCNC: 0.77 MG/DL (ref 0.76–1.27)
DEPRECATED RDW RBC AUTO: 45.3 FL (ref 37–54)
EGFRCR SERPLBLD CKD-EPI 2021: 125.1 ML/MIN/1.73
EOSINOPHIL # BLD AUTO: 0.13 10*3/MM3 (ref 0–0.4)
EOSINOPHIL NFR BLD AUTO: 1.9 % (ref 0.3–6.2)
ERYTHROCYTE [DISTWIDTH] IN BLOOD BY AUTOMATED COUNT: 17.5 % (ref 12.3–15.4)
FERRITIN SERPL-MCNC: 56.92 NG/ML (ref 30–400)
GLOBULIN UR ELPH-MCNC: 3.1 GM/DL
GLUCOSE SERPL-MCNC: 106 MG/DL (ref 65–99)
HCT VFR BLD AUTO: 36.1 % (ref 37.5–51)
HGB BLD-MCNC: 10.7 G/DL (ref 13–17.7)
HYPOCHROMIA BLD QL: NORMAL
IMM GRANULOCYTES # BLD AUTO: 0.03 10*3/MM3 (ref 0–0.05)
IMM GRANULOCYTES NFR BLD AUTO: 0.4 % (ref 0–0.5)
INR PPP: 1.94 (ref 0.9–1.1)
IRON 24H UR-MRATE: 27 MCG/DL (ref 59–158)
IRON SATN MFR SERPL: 7 % (ref 20–50)
LYMPHOCYTES # BLD AUTO: 1.5 10*3/MM3 (ref 0.7–3.1)
LYMPHOCYTES NFR BLD AUTO: 21.7 % (ref 19.6–45.3)
MCH RBC QN AUTO: 21.4 PG (ref 26.6–33)
MCHC RBC AUTO-ENTMCNC: 29.6 G/DL (ref 31.5–35.7)
MCV RBC AUTO: 72.2 FL (ref 79–97)
MICROCYTES BLD QL: NORMAL
MONOCYTES # BLD AUTO: 0.65 10*3/MM3 (ref 0.1–0.9)
MONOCYTES NFR BLD AUTO: 9.4 % (ref 5–12)
NEUTROPHILS NFR BLD AUTO: 4.56 10*3/MM3 (ref 1.7–7)
NEUTROPHILS NFR BLD AUTO: 66.2 % (ref 42.7–76)
NRBC BLD AUTO-RTO: 0 /100 WBC (ref 0–0.2)
PLATELET # BLD AUTO: 249 10*3/MM3 (ref 140–450)
PMV BLD AUTO: 9.7 FL (ref 6–12)
POTASSIUM SERPL-SCNC: 4.1 MMOL/L (ref 3.5–5.2)
PROT SERPL-MCNC: 7 G/DL (ref 6–8.5)
PROTHROMBIN TIME: 22.6 SECONDS (ref 12.3–15.1)
RBC # BLD AUTO: 5 10*6/MM3 (ref 4.14–5.8)
SMALL PLATELETS BLD QL SMEAR: ADEQUATE
SODIUM SERPL-SCNC: 136 MMOL/L (ref 136–145)
TIBC SERPL-MCNC: 377 MCG/DL (ref 298–536)
TRANSFERRIN SERPL-MCNC: 253 MG/DL (ref 200–360)
WBC MORPH BLD: NORMAL
WBC NRBC COR # BLD AUTO: 6.9 10*3/MM3 (ref 3.4–10.8)

## 2024-08-19 PROCEDURE — 85007 BL SMEAR W/DIFF WBC COUNT: CPT

## 2024-08-19 PROCEDURE — 85610 PROTHROMBIN TIME: CPT

## 2024-08-19 PROCEDURE — 84466 ASSAY OF TRANSFERRIN: CPT

## 2024-08-19 PROCEDURE — 83540 ASSAY OF IRON: CPT

## 2024-08-19 PROCEDURE — 80053 COMPREHEN METABOLIC PANEL: CPT

## 2024-08-19 PROCEDURE — 85025 COMPLETE CBC W/AUTO DIFF WBC: CPT

## 2024-08-19 PROCEDURE — 36415 COLL VENOUS BLD VENIPUNCTURE: CPT

## 2024-08-19 PROCEDURE — 82728 ASSAY OF FERRITIN: CPT

## 2024-08-19 RX ORDER — FERROUS SULFATE 325(65) MG
325 TABLET ORAL
Qty: 30 TABLET | Refills: 2 | Status: SHIPPED | OUTPATIENT
Start: 2024-08-19

## 2024-08-19 NOTE — PROGRESS NOTES
Hematology and Oncology [place]  Office number [phone]    Fax number [phone]    Follow up      Date: 24     Patient Name: Jesús Mijares  MRN: [id number]  : 1995    Referred by: IRIS Ro    Chief Complaint: Bilateral PE    History of Present Illness: Jesús Mijares is a pleasant 28 y.o. male who presents today for evaluation of bilateral PE    The patient presented with progressive dyspnea and underwent CT of the chest to the ER 3/4/24 demonstrating  large burden of bilateral acute pulmonary embolus. Right heart strain.Right upper lobe and left lower lobe patchy lung opacities. Consider multifocal pneumonia.  Hospital notes mention being tested for RSV on 24, but he does not specifically recall that and his respiratory panel PCR from 3/3/2024 was negative. No preceding surgeries, travel. Works at [employer], on feet 8 hours per day for this job.    He was assessed by cardiology, but no thrombectomy was recommended.  He was recommended for Coumadin over DOAC given that Body mass index is 77.15 kg/m².    Reports his most recent INR was 4 and coumadin held 2 days, having labs redrawn today for PCP. Prior to holding he was on 8 mg daily. Unaware of dietary restrictions with Coumadin but states that he rarely eats fruits or vegetables.    Interval history:   He presents today for follow-up.  His PCP has been monitoring his Coumadin, INRs have been better controlled.  He denies any specific bleeding symptoms.  His breathing is stable, not specifically improved or worsened.  No chest pain.      Past Medical History:   Past Medical History:   Diagnosis Date    Asthma, intrinsic     Bronchitis     Hypertension     Pulmonary embolism 2024       Past Surgical History:   Past Surgical History:   Procedure Laterality Date    TONSILECTOMY, ADENOIDECTOMY, BILATERAL MYRINGOTOMY AND TUBES Bilateral        Family History:   Family History   Problem Relation Age  of Onset    Hypothyroidism Mother     Osteoarthritis Mother     Hypertension Mother     Depression Mother     Anxiety disorder Mother     Obesity Mother     Obesity Father     Depression Father     Hypertension Father     Diabetes Father     Hypertension Brother     No Known Problems Maternal Grandmother     Hypertension Other    Grandmother  from blood clots at age 65. No other family of blood clots or sudden death.   Reportedly his grandmother had 4 types of breast cancer at age 16, 32, 47.  Aunt with thyroid cancer at age 43.  Aunt with melanoma at age 41.    Social History:   Social History     Socioeconomic History    Marital status: Single   Tobacco Use    Smoking status: Former     Current packs/day: 0.00     Types: Cigarettes     Quit date:      Years since quittin.6    Smokeless tobacco: Never    Tobacco comments:     patient smoke 3 months   Vaping Use    Vaping status: Never Used   Substance and Sexual Activity    Alcohol use: No    Drug use: No    Sexual activity: Defer       Medications:     Current Outpatient Medications:     albuterol sulfate  (90 Base) MCG/ACT inhaler, Inhale 2 puffs Every 4 (Four) Hours As Needed for Wheezing., Disp: 18 g, Rfl: 0    amLODIPine (NORVASC) 5 MG tablet, Take 1 tablet by mouth Daily., Disp: 90 tablet, Rfl: 3    azelastine (ASTELIN) 0.1 % nasal spray, 1 spray into the nostril(s) as directed by provider 2 (Two) Times a Day. Use in each nostril as directed, Disp: 30 mL, Rfl: 5    carvedilol (COREG) 25 MG tablet, Take 1 tablet by mouth 2 (Two) Times a Day With Meals., Disp: 180 tablet, Rfl: 3    lisinopril-hydrochlorothiazide (PRINZIDE,ZESTORETIC) 20-12.5 MG per tablet, Take 1 tablet by mouth Daily. , Disp: 90 tablet, Rfl: 3    triamcinolone (KENALOG) 0.025 % cream, APPLY TOPICALLY TO THE LEFT THIGH TWICE DAILY, Disp: , Rfl:     warfarin (COUMADIN) 2.5 MG tablet, Take 1 tablet by mouth every evening, in additon to 4 mg tablet (6.5 mg daily), until  "follow up with PCP, Disp: 6 tablet, Rfl: 0    warfarin (COUMADIN) 4 MG tablet, Take 1 tablet by mouth every evening, in addition to 2.5 mg tablet (6.5 mg daily),  until follow up with PCP, Disp: 6 tablet, Rfl: 0    Allergies:   Allergies   Allergen Reactions    Cherry Flavor Hives    Lorabid [Loracarbef] Hives       Objective     Vital Signs:   Vitals:    08/19/24 1019   BP: 138/74   Pulse: 88   Resp: 18   Temp: 97.7 °F (36.5 °C)   SpO2: 98%   Weight: (!) 258 kg (569 lb)   Height: 182.9 cm (72.01\")   PainSc: 0-No pain    Body mass index is 77.15 kg/m².   Pain Score    08/19/24 1019   PainSc: 0-No pain       Physical Exam:   General: Morbidly obese no acute distress.  HEENT: Normocephalic, atraumatic. Sclera anicteric.   Neck: supple, no adenopathy.   Cardiovascular: regular rate and rhythm. No murmurs.   Respiratory: Normal rate. Clear to auscultation bilaterally.  Abdomen: Soft, nontender, non distended with normoactive bowel sounds. No hepatosplenomegaly  Lymph: no cervical, supraclavicular or axillary adenopathy.  Neuro: Alert and oriented x 3. No focal deficits.   Ext: Symmetric, no swelling.     Laboratory/Imaging Reviewed:   Lab on 08/19/2024   Component Date Value Ref Range Status    Glucose 08/19/2024 106 (H)  65 - 99 mg/dL Final    BUN 08/19/2024 14  6 - 20 mg/dL Final    Creatinine 08/19/2024 0.77  0.76 - 1.27 mg/dL Final    Sodium 08/19/2024 136  136 - 145 mmol/L Final    Potassium 08/19/2024 4.1  3.5 - 5.2 mmol/L Final    Chloride 08/19/2024 101  98 - 107 mmol/L Final    CO2 08/19/2024 26.9  22.0 - 29.0 mmol/L Final    Calcium 08/19/2024 9.7  8.6 - 10.5 mg/dL Final    Total Protein 08/19/2024 7.0  6.0 - 8.5 g/dL Final    Albumin 08/19/2024 3.9  3.5 - 5.2 g/dL Final    ALT (SGPT) 08/19/2024 21  1 - 41 U/L Final    AST (SGOT) 08/19/2024 22  1 - 40 U/L Final    Alkaline Phosphatase 08/19/2024 97  39 - 117 U/L Final    Total Bilirubin 08/19/2024 0.4  0.0 - 1.2 mg/dL Final    Globulin 08/19/2024 3.1  gm/dL " Final    A/G Ratio 08/19/2024 1.3  g/dL Final    BUN/Creatinine Ratio 08/19/2024 18.2  7.0 - 25.0 Final    Anion Gap 08/19/2024 8.1  5.0 - 15.0 mmol/L Final    eGFR 08/19/2024 125.1  >60.0 mL/min/1.73 Final    Protime 08/19/2024 22.6 (H)  12.3 - 15.1 Seconds Final    INR 08/19/2024 1.94 (H)  0.90 - 1.10 Final    Ferritin 08/19/2024 56.92  30.00 - 400.00 ng/mL Final    Iron 08/19/2024 27 (L)  59 - 158 mcg/dL Final    Iron Saturation (TSAT) 08/19/2024 7 (L)  20 - 50 % Final    Transferrin 08/19/2024 253  200 - 360 mg/dL Final    TIBC 08/19/2024 377  298 - 536 mcg/dL Final    WBC 08/19/2024 6.90  3.40 - 10.80 10*3/mm3 Final    RBC 08/19/2024 5.00  4.14 - 5.80 10*6/mm3 Final    Hemoglobin 08/19/2024 10.7 (L)  13.0 - 17.7 g/dL Final    Hematocrit 08/19/2024 36.1 (L)  37.5 - 51.0 % Final    MCV 08/19/2024 72.2 (L)  79.0 - 97.0 fL Final    MCH 08/19/2024 21.4 (L)  26.6 - 33.0 pg Final    MCHC 08/19/2024 29.6 (L)  31.5 - 35.7 g/dL Final    RDW 08/19/2024 17.5 (H)  12.3 - 15.4 % Final    RDW-SD 08/19/2024 45.3  37.0 - 54.0 fl Final    MPV 08/19/2024 9.7  6.0 - 12.0 fL Final    Platelets 08/19/2024 249  140 - 450 10*3/mm3 Final    Neutrophil % 08/19/2024 66.2  42.7 - 76.0 % Final    Lymphocyte % 08/19/2024 21.7  19.6 - 45.3 % Final    Monocyte % 08/19/2024 9.4  5.0 - 12.0 % Final    Eosinophil % 08/19/2024 1.9  0.3 - 6.2 % Final    Basophil % 08/19/2024 0.4  0.0 - 1.5 % Final    Immature Grans % 08/19/2024 0.4  0.0 - 0.5 % Final    Neutrophils, Absolute 08/19/2024 4.56  1.70 - 7.00 10*3/mm3 Final    Lymphocytes, Absolute 08/19/2024 1.50  0.70 - 3.10 10*3/mm3 Final    Monocytes, Absolute 08/19/2024 0.65  0.10 - 0.90 10*3/mm3 Final    Eosinophils, Absolute 08/19/2024 0.13  0.00 - 0.40 10*3/mm3 Final    Basophils, Absolute 08/19/2024 0.03  0.00 - 0.20 10*3/mm3 Final    Immature Grans, Absolute 08/19/2024 0.03  0.00 - 0.05 10*3/mm3 Final    nRBC 08/19/2024 0.0  0.0 - 0.2 /100 WBC Final    Hypochromia 08/19/2024 Slight/1+  None  Seen Final    Microcytes 08/19/2024 Slight/1+  None Seen Final    WBC Morphology 08/19/2024 Normal  Normal Final    Platelet Estimate 08/19/2024 Adequate  Normal Final       Component      Latest Ref Rng 5/20/2024 5/21/2024 5/22/2024 5/28/2024 5/31/2024 6/3/2024 6/7/2024   Protime      12.3 - 15.1 Seconds 43.5 (H)  35.8 (H)  26.6 (H)  18.4 (H)  19.2 (H)  27.1 (H)  36.0 (H)    INR      0.90 - 1.10  4.49 (HH)  3.49 (H)  2.37 (H)  1.47 (H)  1.55 (H)  2.43 (H)  3.52 (H)      Component      Latest Ref Rng 6/14/2024 6/15/2024 6/17/2024 6/21/2024 6/24/2024   Protime      12.3 - 15.1 Seconds 41.4 (H)  34.6 (H)  25.1 (H)  20.6 (H)  19.9 (H)    INR      0.90 - 1.10  4.21 (HH)  3.34 (H)  2.20 (H)  1.70 (H)  1.62 (H)      CT Angiogram Chest    Result Date: 8/13/2024  Narrative: PROCEDURE: CT ANGIOGRAM CHEST-  HISTORY: blood clot evaluation; I26.99-Other pulmonary embolism without acute cor pulmonale  COMPARISON: 03/03/2024  TECHNIQUE: Thin section axial CT with IV contrast supplemented with 3D reconstructed MIP images.  FINDINGS:  Assessment of peripheral pulmonary branches is suboptimal. However given this limitation, there is some residual embolic disease involving right lower lobe segmental and subsegmental branches although improved. Left lower lobe filling defects have largely resolved. No upper lobe pulmonary embolism is present. There is no evidence of right heart strain.  Thoracic aorta is normal in caliber without dissection.  Moderate dependent atelectasis of the lower lobes is present. Left lower lobe infiltrate has resolved.  No pleural or pericardial effusion is seen. No adenopathy or mass lesion is present.  Fatty infiltration of liver is seen. Gynecomastia is noted.      Impression: Small pulmonary embolism involving right lower lobe segmental and subsegmental branches improved from prior exam. Grossly resolved embolic disease involving left lung from prior exam.  This study was performed with techniques to keep  radiation doses as low as reasonably achievable (ALARA). Individualized dose reduction techniques using automated exposure control or adjustment of vA and/or kV according to the patient size were employed.  This report was signed and finalized on 8/13/2024 12:23 PM by Raj Yee MD.       Assessment / Plan      Assessment/Plan:     1. Other acute pulmonary embolism, unspecified whether acute cor pulmonale present  2. Body mass index is 77.15 kg/m².    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  There is a family history of DVT, but this occurred later in life in a grandparent.  -He has no specific history of high risk of travel or preceding illness/immobility.  Although his BMI is elevated, he reports that he stands for 8 hours a day and to work as a .  -I agree with Coumadin given his elevated BMI.   -He should continue with close INR monitoring.  I reinforced a vitamin K consistent diet.  He has been referred to nutrition and consultation is pending with them soon.  We discussed the option of referral to the Coumadin clinic for pharmacist assessment with Coumadin management if desired by his PCP.  -His hypercoagulable workup to date has been limited by concurrent anticoagulation.  We reviewed available workup today.  This is notable for negative beta-2 glycoprotein antibodies and anticardiolipin IgM and IgG.  Prothrombin gene mutation and factor V Leiden mutation were not identified.  PNH was negative.  -Repeat chest CT given that he has been outside of the therapeutic range and continues to experience stable dyspnea on exertion to reevaluate his clot burden.  Given his unexplained anemia and recent unprovoked thrombus I recommended an abdominal pelvis CT but his insurance declined this.   -We reviewed his chest CT which showed substantial improvement in his thrombus burden.  Continue Coumadin.  Would consider indefinite anticoagulation given that this was an unprovoked event and  large PE burden  -We can consider further hypercoagulable workup during Lovenox bridge for GI workup as below.    3.  Strong family history of malignancy including multiple family members with early onset malignancy  -Ultimately, I would recommend consideration of genetic testing  -He wishes to defer for now.    4.  Iron deficiency anemia   -Worsening anemia with low iron stores.  Despite negative fecal occult blood, occult GI blood loss is still the most likely source.  -Referral to GI for consideration of upper and lower endoscopies.  Once we know when these are scheduled, we can make recommendations for Lovenox bridge.  -Ferrous sulfate daily.    Orders Placed This Encounter   Procedures    Comprehensive Metabolic Panel    Ferritin    Iron Profile    Ambulatory Referral to Gastroenterology    CBC & Differential      Follow Up:   3 mo     Yolanda Leon MD  Hematology and Oncology

## 2024-08-19 NOTE — PATIENT INSTRUCTIONS
Call when your GI workup is scheduled, we can give instructions for holding blood thnner around that time.

## 2024-08-23 ENCOUNTER — HOSPITAL ENCOUNTER (OUTPATIENT)
Dept: NUTRITION | Facility: HOSPITAL | Age: 29
Discharge: HOME | End: 2024-08-23
Payer: COMMERCIAL

## 2024-08-23 ENCOUNTER — LAB (OUTPATIENT)
Dept: LAB | Facility: HOSPITAL | Age: 29
End: 2024-08-23
Payer: COMMERCIAL

## 2024-08-23 DIAGNOSIS — Z79.01 LONG TERM (CURRENT) USE OF ANTICOAGULANTS: ICD-10-CM

## 2024-08-23 LAB
INR PPP: 2.69 (ref 0.9–1.1)
PROTHROMBIN TIME: 29 SECONDS (ref 12.3–15.1)

## 2024-08-23 PROCEDURE — 85610 PROTHROMBIN TIME: CPT

## 2024-08-23 PROCEDURE — 97802 MEDICAL NUTRITION INDIV IN: CPT

## 2024-08-23 PROCEDURE — 36415 COLL VENOUS BLD VENIPUNCTURE: CPT

## 2024-08-23 NOTE — PROGRESS NOTES
Adult Outpatient Nutrition  Assessment    Patient Name:  Jesús Mijares  YOB: 1995  MRN: [id number]    Assessment Date:  8/23/2024    Comments:      Met w/ patient for follow-up assessment regarding morbid obesity. Pt reports he has lost 10lb since last visit and is now 569#. Pt reports he has decreased snacking and soda intake. Pt reports he is walking to work when it isn't too hot outside.     Recommended patient decrease soda intake even more to two sodas/day. Recommended patient continue to work on changes he has made so far. Pt scheduled for follow-up appointment on September 12th @ 10:30leatha. RD available PRN.     Electronically signed by:  Jose Galeano RD  08/23/24 14:16 EDT

## 2024-08-30 ENCOUNTER — LAB (OUTPATIENT)
Dept: LAB | Facility: HOSPITAL | Age: 29
End: 2024-08-30
Payer: COMMERCIAL

## 2024-08-30 DIAGNOSIS — Z79.01 LONG TERM (CURRENT) USE OF ANTICOAGULANTS: ICD-10-CM

## 2024-08-30 LAB
INR PPP: 4.07 (ref 0.9–1.1)
PROTHROMBIN TIME: 39.7 SECONDS (ref 12.3–15.1)

## 2024-08-30 PROCEDURE — 36415 COLL VENOUS BLD VENIPUNCTURE: CPT

## 2024-08-30 PROCEDURE — 85610 PROTHROMBIN TIME: CPT

## 2024-08-31 ENCOUNTER — LAB (OUTPATIENT)
Dept: LAB | Facility: HOSPITAL | Age: 29
End: 2024-08-31
Payer: COMMERCIAL

## 2024-08-31 DIAGNOSIS — Z79.01 LONG TERM (CURRENT) USE OF ANTICOAGULANTS: ICD-10-CM

## 2024-08-31 LAB
INR PPP: 3.37 (ref 0.9–1.1)
PROTHROMBIN TIME: 34.4 SECONDS (ref 12.3–15.1)

## 2024-08-31 PROCEDURE — 85610 PROTHROMBIN TIME: CPT

## 2024-08-31 PROCEDURE — 36415 COLL VENOUS BLD VENIPUNCTURE: CPT

## 2024-09-01 LAB
INR PPP: 2.47 (ref 0.9–1.1)
PROTHROMBIN TIME: 27.2 SECONDS (ref 12.3–15.1)

## 2024-09-01 PROCEDURE — 85610 PROTHROMBIN TIME: CPT

## 2024-09-04 ENCOUNTER — LAB (OUTPATIENT)
Dept: LAB | Facility: HOSPITAL | Age: 29
End: 2024-09-04
Payer: COMMERCIAL

## 2024-09-04 DIAGNOSIS — Z79.01 LONG TERM (CURRENT) USE OF ANTICOAGULANTS: ICD-10-CM

## 2024-09-04 LAB
INR PPP: 2.01 (ref 0.9–1.1)
PROTHROMBIN TIME: 23.2 SECONDS (ref 12.3–15.1)

## 2024-09-04 PROCEDURE — 36415 COLL VENOUS BLD VENIPUNCTURE: CPT

## 2024-09-04 PROCEDURE — 85610 PROTHROMBIN TIME: CPT

## 2024-09-10 ENCOUNTER — HOSPITAL ENCOUNTER (OUTPATIENT)
Dept: SLEEP MEDICINE | Facility: HOSPITAL | Age: 29
Discharge: HOME | End: 2024-09-10
Admitting: INTERNAL MEDICINE
Payer: COMMERCIAL

## 2024-09-10 DIAGNOSIS — G47.33 OSA (OBSTRUCTIVE SLEEP APNEA): ICD-10-CM

## 2024-09-10 DIAGNOSIS — E66.01 MORBID OBESITY, UNSPECIFIED OBESITY TYPE: ICD-10-CM

## 2024-09-10 PROCEDURE — 95811 POLYSOM 6/>YRS CPAP 4/> PARM: CPT

## 2024-09-12 ENCOUNTER — LAB (OUTPATIENT)
Dept: LAB | Facility: HOSPITAL | Age: 29
End: 2024-09-12
Payer: COMMERCIAL

## 2024-09-12 ENCOUNTER — TRANSCRIBE ORDERS (OUTPATIENT)
Dept: PREADMISSION TESTING | Facility: HOSPITAL | Age: 29
End: 2024-09-12
Payer: COMMERCIAL

## 2024-09-12 ENCOUNTER — HOSPITAL ENCOUNTER (OUTPATIENT)
Dept: NUTRITION | Facility: HOSPITAL | Age: 29
Discharge: HOME | End: 2024-09-12
Payer: COMMERCIAL

## 2024-09-12 DIAGNOSIS — Z79.01 LONG TERM (CURRENT) USE OF ANTICOAGULANTS: ICD-10-CM

## 2024-09-12 LAB
INR PPP: 2.33 (ref 0.9–1.1)
PROTHROMBIN TIME: 26 SECONDS (ref 12.3–15.1)

## 2024-09-12 PROCEDURE — 97803 MED NUTRITION INDIV SUBSEQ: CPT

## 2024-09-12 PROCEDURE — 85610 PROTHROMBIN TIME: CPT

## 2024-09-12 PROCEDURE — 36415 COLL VENOUS BLD VENIPUNCTURE: CPT

## 2024-09-12 NOTE — PROGRESS NOTES
Adult Outpatient Nutrition  Assessment    Patient Name:  Jesús Mijares  YOB: 1995  MRN: [id number]    Assessment Date:  9/12/2024    Comments:  Met with pt for nutrition f/up. Pt reports he is walking to and from work for exercise everyday. Pt continues to limit snacking. Pt has started eating vegetables and salads. Pt is drinking two bottles of diet pop/day. Pt is unsure of CBW.    Reviewed Consistent Vitamin K Diet and eating side salads paired with vegetables that are low in Vitamin K. Encouraged pt to switch to two cans of pop/day.     Scheduled f/up appointment for November 21 @ 10:30 am.    Electronically signed by:  Fozia Houser RD  09/12/24 14:10 EDT

## 2024-09-13 PROCEDURE — 95811 POLYSOM 6/>YRS CPAP 4/> PARM: CPT | Performed by: INTERNAL MEDICINE

## 2024-09-19 ENCOUNTER — LAB (OUTPATIENT)
Dept: LAB | Facility: HOSPITAL | Age: 29
End: 2024-09-19
Payer: COMMERCIAL

## 2024-09-19 DIAGNOSIS — G47.33 OSA (OBSTRUCTIVE SLEEP APNEA): Primary | ICD-10-CM

## 2024-09-19 DIAGNOSIS — Z79.01 LONG TERM (CURRENT) USE OF ANTICOAGULANTS: ICD-10-CM

## 2024-09-19 LAB
INR PPP: 2.12 (ref 0.9–1.1)
PROTHROMBIN TIME: 24.2 SECONDS (ref 12.3–15.1)

## 2024-09-19 PROCEDURE — 36415 COLL VENOUS BLD VENIPUNCTURE: CPT

## 2024-09-19 PROCEDURE — 85610 PROTHROMBIN TIME: CPT

## 2024-09-27 ENCOUNTER — LAB (OUTPATIENT)
Dept: LAB | Facility: HOSPITAL | Age: 29
End: 2024-09-27
Payer: COMMERCIAL

## 2024-09-27 DIAGNOSIS — Z79.01 LONG TERM (CURRENT) USE OF ANTICOAGULANTS: ICD-10-CM

## 2024-09-27 LAB
INR PPP: 1.9 (ref 0.9–1.1)
PROTHROMBIN TIME: 22.3 SECONDS (ref 12.3–15.1)

## 2024-09-27 PROCEDURE — 85610 PROTHROMBIN TIME: CPT

## 2024-09-27 PROCEDURE — 36415 COLL VENOUS BLD VENIPUNCTURE: CPT

## 2024-10-04 ENCOUNTER — LAB (OUTPATIENT)
Dept: LAB | Facility: HOSPITAL | Age: 29
End: 2024-10-04
Payer: COMMERCIAL

## 2024-10-04 DIAGNOSIS — Z79.01 LONG TERM (CURRENT) USE OF ANTICOAGULANTS: ICD-10-CM

## 2024-10-04 LAB
INR PPP: 2.65 (ref 0.9–1.1)
PROTHROMBIN TIME: 28.7 SECONDS (ref 12.3–15.1)

## 2024-10-04 PROCEDURE — 36415 COLL VENOUS BLD VENIPUNCTURE: CPT

## 2024-10-04 PROCEDURE — 85610 PROTHROMBIN TIME: CPT

## 2024-10-11 ENCOUNTER — LAB (OUTPATIENT)
Dept: LAB | Facility: HOSPITAL | Age: 29
End: 2024-10-11
Payer: COMMERCIAL

## 2024-10-11 DIAGNOSIS — Z79.01 LONG TERM (CURRENT) USE OF ANTICOAGULANTS: ICD-10-CM

## 2024-10-11 LAB
INR PPP: 2.4 (ref 0.9–1.1)
PROTHROMBIN TIME: 26.6 SECONDS (ref 12.3–15.1)

## 2024-10-11 PROCEDURE — 36415 COLL VENOUS BLD VENIPUNCTURE: CPT

## 2024-10-11 PROCEDURE — 85610 PROTHROMBIN TIME: CPT

## 2024-10-25 ENCOUNTER — LAB (OUTPATIENT)
Dept: LAB | Facility: HOSPITAL | Age: 29
End: 2024-10-25
Payer: COMMERCIAL

## 2024-10-25 DIAGNOSIS — Z79.01 LONG TERM (CURRENT) USE OF ANTICOAGULANTS: ICD-10-CM

## 2024-10-25 LAB
INR PPP: 2.25 (ref 0.9–1.1)
PROTHROMBIN TIME: 25.3 SECONDS (ref 12.3–15.1)

## 2024-10-25 PROCEDURE — 85610 PROTHROMBIN TIME: CPT

## 2024-10-25 PROCEDURE — 36415 COLL VENOUS BLD VENIPUNCTURE: CPT

## 2024-11-01 ENCOUNTER — LAB (OUTPATIENT)
Dept: LAB | Facility: HOSPITAL | Age: 29
End: 2024-11-01
Payer: COMMERCIAL

## 2024-11-01 DIAGNOSIS — Z79.01 LONG TERM (CURRENT) USE OF ANTICOAGULANTS: ICD-10-CM

## 2024-11-01 LAB
INR PPP: 2.48 (ref 0.9–1.1)
PROTHROMBIN TIME: 27.3 SECONDS (ref 12.3–15.1)

## 2024-11-01 PROCEDURE — 85610 PROTHROMBIN TIME: CPT

## 2024-11-01 PROCEDURE — 36415 COLL VENOUS BLD VENIPUNCTURE: CPT

## 2024-11-08 ENCOUNTER — LAB (OUTPATIENT)
Dept: LAB | Facility: HOSPITAL | Age: 29
End: 2024-11-08
Payer: COMMERCIAL

## 2024-11-08 DIAGNOSIS — Z79.01 LONG TERM (CURRENT) USE OF ANTICOAGULANTS: ICD-10-CM

## 2024-11-08 LAB
INR PPP: 1.92 (ref 0.9–1.1)
PROTHROMBIN TIME: 22.5 SECONDS (ref 12.3–15.1)

## 2024-11-08 PROCEDURE — 85610 PROTHROMBIN TIME: CPT

## 2024-11-08 PROCEDURE — 36415 COLL VENOUS BLD VENIPUNCTURE: CPT

## 2024-11-11 RX ORDER — FERROUS SULFATE 325(65) MG
1 TABLET ORAL
Qty: 30 TABLET | Refills: 2 | Status: SHIPPED | OUTPATIENT
Start: 2024-11-11

## 2024-11-15 ENCOUNTER — LAB (OUTPATIENT)
Dept: LAB | Facility: HOSPITAL | Age: 29
End: 2024-11-15
Payer: COMMERCIAL

## 2024-11-15 DIAGNOSIS — Z79.01 LONG TERM (CURRENT) USE OF ANTICOAGULANTS: ICD-10-CM

## 2024-11-15 LAB
INR PPP: 2.89 (ref 0.9–1.1)
PROTHROMBIN TIME: 30.6 SECONDS (ref 12.3–15.1)

## 2024-11-15 PROCEDURE — 36415 COLL VENOUS BLD VENIPUNCTURE: CPT

## 2024-11-15 PROCEDURE — 85610 PROTHROMBIN TIME: CPT

## 2024-11-18 ENCOUNTER — LAB (OUTPATIENT)
Dept: LAB | Facility: HOSPITAL | Age: 29
End: 2024-11-18
Payer: COMMERCIAL

## 2024-11-18 ENCOUNTER — OFFICE VISIT (OUTPATIENT)
Dept: ONCOLOGY | Facility: CLINIC | Age: 29
End: 2024-11-18
Payer: COMMERCIAL

## 2024-11-18 VITALS
HEIGHT: 72 IN | DIASTOLIC BLOOD PRESSURE: 81 MMHG | OXYGEN SATURATION: 97 % | TEMPERATURE: 97.3 F | WEIGHT: 315 LBS | HEART RATE: 83 BPM | RESPIRATION RATE: 20 BRPM | SYSTOLIC BLOOD PRESSURE: 154 MMHG | BODY MASS INDEX: 42.66 KG/M2

## 2024-11-18 DIAGNOSIS — I26.99 OTHER ACUTE PULMONARY EMBOLISM, UNSPECIFIED WHETHER ACUTE COR PULMONALE PRESENT: Primary | ICD-10-CM

## 2024-11-18 DIAGNOSIS — D50.9 MICROCYTIC ANEMIA: ICD-10-CM

## 2024-11-18 DIAGNOSIS — I26.99 OTHER ACUTE PULMONARY EMBOLISM, UNSPECIFIED WHETHER ACUTE COR PULMONALE PRESENT: ICD-10-CM

## 2024-11-18 DIAGNOSIS — Z79.01 LONG TERM (CURRENT) USE OF ANTICOAGULANTS: ICD-10-CM

## 2024-11-18 LAB
ALBUMIN SERPL-MCNC: 3.9 G/DL (ref 3.5–5.2)
ALBUMIN/GLOB SERPL: 1 G/DL
ALP SERPL-CCNC: 107 U/L (ref 39–117)
ALT SERPL W P-5'-P-CCNC: 21 U/L (ref 1–41)
ANION GAP SERPL CALCULATED.3IONS-SCNC: 13.2 MMOL/L (ref 5–15)
AST SERPL-CCNC: 21 U/L (ref 1–40)
BASOPHILS # BLD AUTO: 0.04 10*3/MM3 (ref 0–0.2)
BASOPHILS NFR BLD AUTO: 0.4 % (ref 0–1.5)
BILIRUB SERPL-MCNC: 0.5 MG/DL (ref 0–1.2)
BUN SERPL-MCNC: 9 MG/DL (ref 6–20)
BUN/CREAT SERPL: 10 (ref 7–25)
CALCIUM SPEC-SCNC: 9.5 MG/DL (ref 8.6–10.5)
CHLORIDE SERPL-SCNC: 97 MMOL/L (ref 98–107)
CO2 SERPL-SCNC: 25.8 MMOL/L (ref 22–29)
CREAT SERPL-MCNC: 0.9 MG/DL (ref 0.76–1.27)
DEPRECATED RDW RBC AUTO: 44.1 FL (ref 37–54)
EGFRCR SERPLBLD CKD-EPI 2021: 119.3 ML/MIN/1.73
EOSINOPHIL # BLD AUTO: 0.16 10*3/MM3 (ref 0–0.4)
EOSINOPHIL NFR BLD AUTO: 1.5 % (ref 0.3–6.2)
ERYTHROCYTE [DISTWIDTH] IN BLOOD BY AUTOMATED COUNT: 17.7 % (ref 12.3–15.4)
FERRITIN SERPL-MCNC: 114.3 NG/ML (ref 30–400)
GLOBULIN UR ELPH-MCNC: 3.8 GM/DL
GLUCOSE SERPL-MCNC: 108 MG/DL (ref 65–99)
HCT VFR BLD AUTO: 39.3 % (ref 37.5–51)
HGB BLD-MCNC: 12.2 G/DL (ref 13–17.7)
IMM GRANULOCYTES # BLD AUTO: 0.05 10*3/MM3 (ref 0–0.05)
IMM GRANULOCYTES NFR BLD AUTO: 0.5 % (ref 0–0.5)
INR PPP: 3.29 (ref 0.9–1.1)
IRON 24H UR-MRATE: 21 MCG/DL (ref 59–158)
IRON SATN MFR SERPL: 5 % (ref 20–50)
LYMPHOCYTES # BLD AUTO: 1.2 10*3/MM3 (ref 0.7–3.1)
LYMPHOCYTES NFR BLD AUTO: 11 % (ref 19.6–45.3)
MCH RBC QN AUTO: 22.1 PG (ref 26.6–33)
MCHC RBC AUTO-ENTMCNC: 31 G/DL (ref 31.5–35.7)
MCV RBC AUTO: 71.1 FL (ref 79–97)
MICROCYTES BLD QL: NORMAL
MONOCYTES # BLD AUTO: 0.68 10*3/MM3 (ref 0.1–0.9)
MONOCYTES NFR BLD AUTO: 6.2 % (ref 5–12)
NEUTROPHILS NFR BLD AUTO: 8.82 10*3/MM3 (ref 1.7–7)
NEUTROPHILS NFR BLD AUTO: 80.4 % (ref 42.7–76)
NRBC BLD AUTO-RTO: 0 /100 WBC (ref 0–0.2)
PLATELET # BLD AUTO: 324 10*3/MM3 (ref 140–450)
PMV BLD AUTO: 10 FL (ref 6–12)
POTASSIUM SERPL-SCNC: 4.6 MMOL/L (ref 3.5–5.2)
PROT SERPL-MCNC: 7.7 G/DL (ref 6–8.5)
PROTHROMBIN TIME: 33.8 SECONDS (ref 12.3–15.1)
RBC # BLD AUTO: 5.53 10*6/MM3 (ref 4.14–5.8)
SMALL PLATELETS BLD QL SMEAR: NORMAL
SODIUM SERPL-SCNC: 136 MMOL/L (ref 136–145)
TIBC SERPL-MCNC: 389 MCG/DL (ref 298–536)
TRANSFERRIN SERPL-MCNC: 261 MG/DL (ref 200–360)
WBC MORPH BLD: NORMAL
WBC NRBC COR # BLD AUTO: 10.95 10*3/MM3 (ref 3.4–10.8)

## 2024-11-18 PROCEDURE — 84466 ASSAY OF TRANSFERRIN: CPT

## 2024-11-18 PROCEDURE — 1126F AMNT PAIN NOTED NONE PRSNT: CPT | Performed by: INTERNAL MEDICINE

## 2024-11-18 PROCEDURE — 83540 ASSAY OF IRON: CPT

## 2024-11-18 PROCEDURE — 1160F RVW MEDS BY RX/DR IN RCRD: CPT | Performed by: INTERNAL MEDICINE

## 2024-11-18 PROCEDURE — 85610 PROTHROMBIN TIME: CPT

## 2024-11-18 PROCEDURE — 3077F SYST BP >= 140 MM HG: CPT | Performed by: INTERNAL MEDICINE

## 2024-11-18 PROCEDURE — 83020 HEMOGLOBIN ELECTROPHORESIS: CPT

## 2024-11-18 PROCEDURE — 99214 OFFICE O/P EST MOD 30 MIN: CPT | Performed by: INTERNAL MEDICINE

## 2024-11-18 PROCEDURE — 82728 ASSAY OF FERRITIN: CPT

## 2024-11-18 PROCEDURE — 82525 ASSAY OF COPPER: CPT

## 2024-11-18 PROCEDURE — 80053 COMPREHEN METABOLIC PANEL: CPT

## 2024-11-18 PROCEDURE — 85025 COMPLETE CBC W/AUTO DIFF WBC: CPT

## 2024-11-18 PROCEDURE — 81257 HBA1/HBA2 GENE: CPT

## 2024-11-18 PROCEDURE — 3079F DIAST BP 80-89 MM HG: CPT | Performed by: INTERNAL MEDICINE

## 2024-11-18 PROCEDURE — 36415 COLL VENOUS BLD VENIPUNCTURE: CPT

## 2024-11-18 PROCEDURE — 1159F MED LIST DOCD IN RCRD: CPT | Performed by: INTERNAL MEDICINE

## 2024-11-18 PROCEDURE — 85007 BL SMEAR W/DIFF WBC COUNT: CPT

## 2024-11-18 PROCEDURE — 83655 ASSAY OF LEAD: CPT

## 2024-11-18 RX ORDER — CEFDINIR 300 MG/1
1 CAPSULE ORAL EVERY 12 HOURS SCHEDULED
COMMUNITY
Start: 2024-10-18 | End: 2024-11-25

## 2024-11-18 NOTE — PROGRESS NOTES
Hematology and Oncology [place]  Office number [phone]    Fax number [phone]    Follow up      Date: 24     Patient Name: Jesús Mijares  MRN: [id number]  : 1995    Referred by: IRIS Ro    Chief Complaint: Bilateral PE    History of Present Illness: Jesús Mijares is a pleasant 28 y.o. male who presents today for evaluation of bilateral PE    The patient presented with progressive dyspnea and underwent CT of the chest to the ER 3/4/24 demonstrating  large burden of bilateral acute pulmonary embolus. Right heart strain.Right upper lobe and left lower lobe patchy lung opacities. Consider multifocal pneumonia.  Hospital notes mention being tested for RSV on 24, but he does not specifically recall that and his respiratory panel PCR from 3/3/2024 was negative. No preceding surgeries, travel. Works at [employer], on feet 8 hours per day for this job.    He was assessed by cardiology, but no thrombectomy was recommended.  He was recommended for Coumadin over DOAC given that Body mass index is 73.93 kg/m².    Reports his most recent INR was 4 and coumadin held 2 days, having labs redrawn today for PCP. Prior to holding he was on 8 mg daily. Unaware of dietary restrictions with Coumadin but states that he rarely eats fruits or vegetables.    Interval history:   He presents today for follow-up.   Denies epistaxis, gum bleeding, hematuria, melena or hematochezia. No CP, leg swelling, headaches, or new persistent pain. Endorses daily compliance with ferrous sulfate and coumadin.       Past Medical History:   Past Medical History:   Diagnosis Date    Asthma, intrinsic     Bronchitis     Candidiasis of skin and nail     Cellulitis     GERD (gastroesophageal reflux disease)     Hyperlipidemia     Hypertension     Ingrown nail     Pulmonary embolism 2024    Rash        Past Surgical History:   Past Surgical History:   Procedure Laterality Date    TONSILECTOMY,  ADENOIDECTOMY, BILATERAL MYRINGOTOMY AND TUBES Bilateral        Family History:   Family History   Problem Relation Age of Onset    Hypothyroidism Mother     Osteoarthritis Mother     Hypertension Mother     Depression Mother     Anxiety disorder Mother     Obesity Mother     Obesity Father     Depression Father     Hypertension Father     Diabetes Father     Hypertension Brother     No Known Problems Maternal Grandmother     Hypertension Other    Grandmother  from blood clots at age 65. No other family of blood clots or sudden death.   Reportedly his grandmother had 4 types of breast cancer at age 16, 32, 47.  Aunt with thyroid cancer at age 43.  Aunt with melanoma at age 41.    Social History:   Social History     Socioeconomic History    Marital status: Single   Tobacco Use    Smoking status: Former     Current packs/day: 0.00     Types: Cigarettes     Quit date:      Years since quittin.8    Smokeless tobacco: Never    Tobacco comments:     patient smoke 3 months   Vaping Use    Vaping status: Never Used   Substance and Sexual Activity    Alcohol use: No    Drug use: No    Sexual activity: Defer       Medications:     Current Outpatient Medications:     albuterol sulfate  (90 Base) MCG/ACT inhaler, Inhale 2 puffs Every 4 (Four) Hours As Needed for Wheezing., Disp: 18 g, Rfl: 0    amLODIPine (NORVASC) 5 MG tablet, Take 1 tablet by mouth Daily., Disp: 90 tablet, Rfl: 3    azelastine (ASTELIN) 0.1 % nasal spray, 1 spray into the nostril(s) as directed by provider 2 (Two) Times a Day. Use in each nostril as directed, Disp: 30 mL, Rfl: 5    carvedilol (COREG) 25 MG tablet, Take 1 tablet by mouth 2 (Two) Times a Day With Meals., Disp: 180 tablet, Rfl: 3    cefdinir (OMNICEF) 300 MG capsule, Take 1 capsule by mouth Every 12 (Twelve) Hours., Disp: , Rfl:     ferrous sulfate 325 (65 FE) MG tablet, TAKE 1 TABLET BY MOUTH EVERY DAY WITH BREAKFAST, Disp: 30 tablet, Rfl: 2     "lisinopril-hydrochlorothiazide (PRINZIDE,ZESTORETIC) 20-12.5 MG per tablet, Take 1 tablet by mouth Daily. 200001, Disp: 90 tablet, Rfl: 3    triamcinolone (KENALOG) 0.025 % cream, APPLY TOPICALLY TO THE LEFT THIGH TWICE DAILY, Disp: , Rfl:     warfarin (COUMADIN) 2.5 MG tablet, Take 1 tablet by mouth every evening, in additon to 4 mg tablet (6.5 mg daily), until follow up with PCP, Disp: 6 tablet, Rfl: 0    warfarin (COUMADIN) 4 MG tablet, Take 1 tablet by mouth every evening, in addition to 2.5 mg tablet (6.5 mg daily),  until follow up with PCP, Disp: 6 tablet, Rfl: 0    Allergies:   Allergies   Allergen Reactions    Cherry Flavor Hives    Lorabid [Loracarbef] Hives       Objective     Vital Signs:   Vitals:    11/18/24 1048   BP: 154/81   Pulse: 83   Resp: 20   Temp: 97.3 °F (36.3 °C)   SpO2: 97%   Weight: (!) 247 kg (545 lb 3.2 oz)   Height: 182.9 cm (72.01\")   PainSc: 0-No pain    Body mass index is 73.93 kg/m².   Pain Score    11/18/24 1048   PainSc: 0-No pain       Physical Exam:   General: Morbidly obese no acute distress.  HEENT: Normocephalic, atraumatic. Sclera anicteric.   Neck: supple, no adenopathy.   Cardiovascular: regular rate and rhythm. No murmurs.   Respiratory: Normal rate. Clear to auscultation bilaterally.  Abdomen: Soft, nontender, non distended with normoactive bowel sounds. No hepatosplenomegaly  Lymph: no cervical, supraclavicular or axillary adenopathy.  Neuro: Alert and oriented x 3. No focal deficits.   Ext: Symmetric, no swelling.     Laboratory/Imaging Reviewed:   Lab on 11/15/2024   Component Date Value Ref Range Status    Protime 11/15/2024 30.6 (H)  12.3 - 15.1 Seconds Final    INR 11/15/2024 2.89 (H)  0.90 - 1.10 Final   Lab on 11/08/2024   Component Date Value Ref Range Status    Protime 11/08/2024 22.5 (H)  12.3 - 15.1 Seconds Final    INR 11/08/2024 1.92 (H)  0.90 - 1.10 Final       Component      Latest Ref Rng 5/20/2024 5/21/2024 5/22/2024 5/28/2024 5/31/2024 6/3/2024 " 6/7/2024   Protime      12.3 - 15.1 Seconds 43.5 (H)  35.8 (H)  26.6 (H)  18.4 (H)  19.2 (H)  27.1 (H)  36.0 (H)    INR      0.90 - 1.10  4.49 (HH)  3.49 (H)  2.37 (H)  1.47 (H)  1.55 (H)  2.43 (H)  3.52 (H)      Component      Latest Ref Rng 6/14/2024 6/15/2024 6/17/2024 6/21/2024 6/24/2024   Protime      12.3 - 15.1 Seconds 41.4 (H)  34.6 (H)  25.1 (H)  20.6 (H)  19.9 (H)    INR      0.90 - 1.10  4.21 (HH)  3.34 (H)  2.20 (H)  1.70 (H)  1.62 (H)      No results found.    Assessment / Plan      Assessment/Plan:     1. Other acute pulmonary embolism, unspecified whether acute cor pulmonale present  2. Body mass index is 73.93 kg/m².    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  There is a family history of DVT, but this occurred later in life in a grandparent.  -He has no specific history of high risk of travel or preceding illness/immobility.  Although his BMI is elevated, he reports that he stands for 8 hours a day and to work as a .  -I agree with Coumadin given his elevated BMI.   -He should continue with close INR monitoring.  I reinforced a vitamin K consistent diet.  He has been referred to nutrition and consultation is pending with them soon.  We discussed the option of referral to the Coumadin clinic for pharmacist assessment with Coumadin management if desired by his PCP.  -His hypercoagulable workup to date has been limited by concurrent anticoagulation.  We reviewed available workup today.  This is notable for negative beta-2 glycoprotein antibodies and anticardiolipin IgM and IgG.  Prothrombin gene mutation and factor V Leiden mutation were not identified.  PNH was negative.  -Repeat chest CT given that he has been outside of the therapeutic range and continues to experience stable dyspnea on exertion to reevaluate his clot burden.  Given his unexplained anemia and recent unprovoked thrombus I recommended an abdominal pelvis CT but his insurance declined this.   -We  reviewed his chest CT which showed substantial improvement in his thrombus burden.  Continue Coumadin.  Would consider indefinite anticoagulation given that this was an unprovoked event and large PE burden  -We can consider further hypercoagulable workup during Lovenox bridge for GI workup as below.  -Reviewed 14 recent INRs, predominantly staying in therapeutic range over the last 3 mo.    3.  Strong family history of malignancy including multiple family members with early onset malignancy  -Ultimately, I would recommend consideration of genetic testing  -He wishes to defer for now.    4.  Persistent microcytic anemia concerning for iron deficiency anemia   -Worsening anemia with low iron stores.  Despite negative fecal occult blood, occult GI blood loss is still the most likely source.  -Referral to GI for consideration of upper and lower endoscopies.  Once we know when these are scheduled, we can make recommendations for Lovenox bridge.  -Ferrous sulfate daily.  -Check labs to evaluate for other contributing causes of microcytic anemia:    Orders Placed This Encounter   Procedures    Comprehensive Metabolic Panel    Iron Profile    Ferritin    Copper, Serum    Lead, Blood    Hemoglobinopathy Fractionation Cascade    Alpha - Thalassemia    CBC & Differential      Follow Up:   3 mo     Yolanda Leon MD  Hematology and Oncology

## 2024-11-19 LAB
HGB A MFR BLD ELPH: 98 % (ref 96.4–98.8)
HGB A2 MFR BLD ELPH: 2 % (ref 1.8–3.2)
HGB F MFR BLD ELPH: 0 % (ref 0–2)
HGB FRACT BLD-IMP: NORMAL
HGB S MFR BLD ELPH: 0 %
LEAD BLDV-MCNC: <1 UG/DL (ref 0–3.4)

## 2024-11-20 LAB — COPPER SERPL-MCNC: 154 UG/DL (ref 63–121)

## 2024-11-22 ENCOUNTER — HOSPITAL ENCOUNTER (OUTPATIENT)
Dept: NUTRITION | Facility: HOSPITAL | Age: 29
Discharge: HOME | End: 2024-11-22
Payer: COMMERCIAL

## 2024-11-22 ENCOUNTER — LAB (OUTPATIENT)
Dept: LAB | Facility: HOSPITAL | Age: 29
End: 2024-11-22
Payer: COMMERCIAL

## 2024-11-22 DIAGNOSIS — Z79.01 LONG TERM (CURRENT) USE OF ANTICOAGULANTS: ICD-10-CM

## 2024-11-22 LAB
INR PPP: 3.12 (ref 0.9–1.1)
PROTHROMBIN TIME: 32.4 SECONDS (ref 12.3–15.1)

## 2024-11-22 PROCEDURE — 85610 PROTHROMBIN TIME: CPT

## 2024-11-22 PROCEDURE — 36415 COLL VENOUS BLD VENIPUNCTURE: CPT

## 2024-11-22 PROCEDURE — 97803 MED NUTRITION INDIV SUBSEQ: CPT

## 2024-11-22 NOTE — PROGRESS NOTES
Adult Outpatient Nutrition  Assessment    Patient Name:  Jesús Mijares  YOB: 1995  MRN: [id number]    Assessment Date:  11/22/2024    Comments: Met with pt for f/up. #, BMI 73.93. Pt has lost 34# (5.8%) within 3 months. Wt loss is positive. Pt reports eating smaller portions, not snacking, and only drinking one diet pop on occasion. He is eating some fruits and vegetables however, they are not high in Vitamin K. He is walking to and from work and says that he is not taking as many breaks with walking. He is not walking in addition to walking to work due to weather is getting colder.     Reviewed Vitamin K controled diet and gave pt another handout. Discussed healthy eating tips and watching for vitamin K foods during the holiday. Encouraged pt to look into other physical activities like [place] to stay [place]. Pt agreeable with watching portion sizes and following Vitamin K controlled diet.     Scheduled f/up on Feb.28th @ 10:30 am.    Electronically signed by:  Fozia Houser RD  11/22/24 10:45 EST

## 2024-11-25 ENCOUNTER — OFFICE VISIT (OUTPATIENT)
Dept: GASTROENTEROLOGY | Facility: CLINIC | Age: 29
End: 2024-11-25
Payer: COMMERCIAL

## 2024-11-25 VITALS
DIASTOLIC BLOOD PRESSURE: 54 MMHG | SYSTOLIC BLOOD PRESSURE: 118 MMHG | TEMPERATURE: 98 F | HEIGHT: 72 IN | BODY MASS INDEX: 73.94 KG/M2 | HEART RATE: 75 BPM

## 2024-11-25 DIAGNOSIS — Z79.01 CURRENT USE OF LONG TERM ANTICOAGULATION: ICD-10-CM

## 2024-11-25 DIAGNOSIS — E66.01 MORBID OBESITY WITH BMI OF 70 AND OVER, ADULT: ICD-10-CM

## 2024-11-25 DIAGNOSIS — I26.99 BILATERAL PULMONARY EMBOLISM: ICD-10-CM

## 2024-11-25 DIAGNOSIS — D50.9 IRON DEFICIENCY ANEMIA, UNSPECIFIED IRON DEFICIENCY ANEMIA TYPE: Primary | ICD-10-CM

## 2024-11-25 NOTE — PROGRESS NOTES
New Patient Consult      Date: 2024   Patient Name: Jesús Mijares  MRN: [id number]  : 1995     Referring Physician: Yolanda Leon MD    Chief Complaint   Patient presents with    Anemia     Iron deficiency       History of Present Illness: Jesús Mijares is a 28 y.o. male who is here today to establish care with Gastroenterology for further evaluation of his iron deficiency anemia.  Patient was seen by Dr. Pratibha Leon hematology oncology for a bilateral PE identified earlier this year.  He has been receiving Coumadin since then.  Given his microcytic anemia GI bleeding was suspected and referred here for further evaluation especially EGD and colonoscopy.  And possible PillCam study.  Patient himself denies any passing blood in the stool.  Denies any black stools.  No nausea vomiting no abdominal pain.  He will have formed stool anywhere 2 to 3/day.  He denies any reflux symptoms no dysphagia.  There are distant family members with thyroid cancer, breast cancer however no family history of colon cancer or GI malignancy. Patient has obstructive sleep apnea and known CPAP.  He is an ex-smoker and nonalcoholic.    Subjective      Past Medical History:   Past Medical History:   Diagnosis Date    Asthma, intrinsic     Bronchitis     Candidiasis of skin and nail     Cellulitis     GERD (gastroesophageal reflux disease)     Hyperlipidemia     Hypertension     Ingrown nail     Pulmonary embolism 2024    Rash     Sleep apnea     using C pap machine       Past Surgical History:   Past Surgical History:   Procedure Laterality Date    TONSILECTOMY, ADENOIDECTOMY, BILATERAL MYRINGOTOMY AND TUBES Bilateral        Family History:   Family History   Problem Relation Age of Onset    Hypothyroidism Mother     Osteoarthritis Mother     Hypertension Mother     Depression Mother     Anxiety disorder Mother     Obesity Mother     Obesity Father     Depression Father     Hypertension Father      Diabetes Father     Hypertension Brother     No Known Problems Maternal Grandmother     Hypertension Other     Colon cancer Neg Hx        Social History:   Social History     Socioeconomic History    Marital status: Single   Tobacco Use    Smoking status: Former     Current packs/day: 0.00     Types: Cigarettes     Quit date:      Years since quittin.9    Smokeless tobacco: Never    Tobacco comments:     patient smoke 3 months   Vaping Use    Vaping status: Never Used   Substance and Sexual Activity    Alcohol use: No    Drug use: No    Sexual activity: Defer         Current Outpatient Medications:     albuterol sulfate  (90 Base) MCG/ACT inhaler, Inhale 2 puffs Every 4 (Four) Hours As Needed for Wheezing., Disp: 18 g, Rfl: 0    amLODIPine (NORVASC) 5 MG tablet, Take 1 tablet by mouth Daily., Disp: 90 tablet, Rfl: 3    azelastine (ASTELIN) 0.1 % nasal spray, 1 spray into the nostril(s) as directed by provider 2 (Two) Times a Day. Use in each nostril as directed, Disp: 30 mL, Rfl: 5    carvedilol (COREG) 25 MG tablet, Take 1 tablet by mouth 2 (Two) Times a Day With Meals., Disp: 180 tablet, Rfl: 3    ferrous sulfate 325 (65 FE) MG tablet, TAKE 1 TABLET BY MOUTH EVERY DAY WITH BREAKFAST, Disp: 30 tablet, Rfl: 2    lisinopril-hydrochlorothiazide (PRINZIDE,ZESTORETIC) 20-12.5 MG per tablet, Take 1 tablet by mouth Daily. , Disp: 90 tablet, Rfl: 3    triamcinolone (KENALOG) 0.025 % cream, APPLY TOPICALLY TO THE LEFT THIGH TWICE DAILY, Disp: , Rfl:     warfarin (COUMADIN) 2.5 MG tablet, Take 1 tablet by mouth every evening, in additon to 4 mg tablet (6.5 mg daily), until follow up with PCP, Disp: 6 tablet, Rfl: 0    warfarin (COUMADIN) 4 MG tablet, Take 1 tablet by mouth every evening, in addition to 2.5 mg tablet (6.5 mg daily),  until follow up with PCP, Disp: 6 tablet, Rfl: 0    Allergies   Allergen Reactions    Cherry Flavor Hives    Lorabid [Loracarbef] Hives       Review of Systems:   Review of  "Systems   Constitutional:  Negative for appetite change, fatigue, fever and unexpected weight loss.   HENT:  Negative for trouble swallowing.    Gastrointestinal:  Negative for abdominal distention, abdominal pain, anal bleeding, blood in stool, constipation, diarrhea, nausea, rectal pain, vomiting, GERD and indigestion.       The following portions of the patient's history were reviewed and updated as appropriate: allergies, current medications, past family history, past medical history, past social history, past surgical history and problem list.    Objective     Physical Exam:  Vital Signs:   Vitals:    11/25/24 1325   BP: 118/54  Comment: lower right A/C space   Pulse: 75   Temp: 98 °F (36.7 °C)   TempSrc: Infrared   Weight: Comment: over scale limit   Height: 182.9 cm (72\")  Comment: per EPIC       Physical Exam  Constitutional:       Appearance: He is obese.   HENT:      Head: Normocephalic and atraumatic.   Eyes:      Conjunctiva/sclera: Conjunctivae normal.   Abdominal:      General: Abdomen is flat. There is no distension.      Palpations: There is no mass.      Tenderness: There is no abdominal tenderness. There is no guarding or rebound.      Hernia: No hernia is present.   Musculoskeletal:      Cervical back: Normal range of motion and neck supple.   Neurological:      Mental Status: He is alert.           Results Review:   I have reviewed the patient's new clinical and imaging results and agree with the interpretation.     Lab on 11/22/2024   Component Date Value Ref Range Status    Protime 11/22/2024 32.4 (H)  12.3 - 15.1 Seconds Final    INR 11/22/2024 3.12 (H)  0.90 - 1.10 Final   Lab on 11/18/2024   Component Date Value Ref Range Status    Protime 11/18/2024 33.8 (H)  12.3 - 15.1 Seconds Final    INR 11/18/2024 3.29 (H)  0.90 - 1.10 Final    Glucose 11/18/2024 108 (H)  65 - 99 mg/dL Final    BUN 11/18/2024 9  6 - 20 mg/dL Final    Creatinine 11/18/2024 0.90  0.76 - 1.27 mg/dL Final    Sodium " 11/18/2024 136  136 - 145 mmol/L Final    Potassium 11/18/2024 4.6  3.5 - 5.2 mmol/L Final    Chloride 11/18/2024 97 (L)  98 - 107 mmol/L Final    CO2 11/18/2024 25.8  22.0 - 29.0 mmol/L Final    Calcium 11/18/2024 9.5  8.6 - 10.5 mg/dL Final    Total Protein 11/18/2024 7.7  6.0 - 8.5 g/dL Final    Albumin 11/18/2024 3.9  3.5 - 5.2 g/dL Final    ALT (SGPT) 11/18/2024 21  1 - 41 U/L Final    AST (SGOT) 11/18/2024 21  1 - 40 U/L Final    Alkaline Phosphatase 11/18/2024 107  39 - 117 U/L Final    Total Bilirubin 11/18/2024 0.5  0.0 - 1.2 mg/dL Final    Globulin 11/18/2024 3.8  gm/dL Final    A/G Ratio 11/18/2024 1.0  g/dL Final    BUN/Creatinine Ratio 11/18/2024 10.0  7.0 - 25.0 Final    Anion Gap 11/18/2024 13.2  5.0 - 15.0 mmol/L Final    eGFR 11/18/2024 119.3  >60.0 mL/min/1.73 Final    Iron 11/18/2024 21 (L)  59 - 158 mcg/dL Final    Iron Saturation (TSAT) 11/18/2024 5 (L)  20 - 50 % Final    Transferrin 11/18/2024 261  200 - 360 mg/dL Final    TIBC 11/18/2024 389  298 - 536 mcg/dL Final    Ferritin 11/18/2024 114.30  30.00 - 400.00 ng/mL Final    Copper 11/18/2024 154 (H)  63 - 121 ug/dL Final                                    Detection Limit = 5    Lead 11/18/2024 <1.0  0.0 - 3.4 ug/dL Final    Testing performed by Inductively coupled plasma/Mass Spectrometry.  Analysis by inductively coupled plasma/mass  spectrometry (ICP/MS)                            Environmental Exposure:                             WHO Recommendation     <5.0                            Occupational Exposure:                             OSHA Lead Std          40.0                             YONY                    30.0                                  Detection Limit =  1.0    Hgb F Quant 11/18/2024 0.0  0.0 - 2.0 % Final    Hgb A 11/18/2024 98.0  96.4 - 98.8 % Final    Hgb A2 Quant 11/18/2024 2.0  1.8 - 3.2 % Final    Hgb S 11/18/2024 0.0  0.0 % Final    Hgb Interp. 11/18/2024 Comment   Final    Normal hemoglobin present; no hemoglobin  variant or beta thalassemia  identified.  Note: Alpha thalassemia may not be detected by the Hgb Fractionation  Cascade panel. If alpha thalassemia is suspected, [facility] offers  Alpha-Thalassemia DNA Analysis (#[id number]).    WBC 11/18/2024 10.95 (H)  3.40 - 10.80 10*3/mm3 Final    RBC 11/18/2024 5.53  4.14 - 5.80 10*6/mm3 Final    Hemoglobin 11/18/2024 12.2 (L)  13.0 - 17.7 g/dL Final    Hematocrit 11/18/2024 39.3  37.5 - 51.0 % Final    MCV 11/18/2024 71.1 (L)  79.0 - 97.0 fL Final    MCH 11/18/2024 22.1 (L)  26.6 - 33.0 pg Final    MCHC 11/18/2024 31.0 (L)  31.5 - 35.7 g/dL Final    RDW 11/18/2024 17.7 (H)  12.3 - 15.4 % Final    RDW-SD 11/18/2024 44.1  37.0 - 54.0 fl Final    MPV 11/18/2024 10.0  6.0 - 12.0 fL Final    Platelets 11/18/2024 324  140 - 450 10*3/mm3 Final    Neutrophil % 11/18/2024 80.4 (H)  42.7 - 76.0 % Final    Lymphocyte % 11/18/2024 11.0 (L)  19.6 - 45.3 % Final    Monocyte % 11/18/2024 6.2  5.0 - 12.0 % Final    Eosinophil % 11/18/2024 1.5  0.3 - 6.2 % Final    Basophil % 11/18/2024 0.4  0.0 - 1.5 % Final    Immature Grans % 11/18/2024 0.5  0.0 - 0.5 % Final    Neutrophils, Absolute 11/18/2024 8.82 (H)  1.70 - 7.00 10*3/mm3 Final    Lymphocytes, Absolute 11/18/2024 1.20  0.70 - 3.10 10*3/mm3 Final    Monocytes, Absolute 11/18/2024 0.68  0.10 - 0.90 10*3/mm3 Final    Eosinophils, Absolute 11/18/2024 0.16  0.00 - 0.40 10*3/mm3 Final    Basophils, Absolute 11/18/2024 0.04  0.00 - 0.20 10*3/mm3 Final    Immature Grans, Absolute 11/18/2024 0.05  0.00 - 0.05 10*3/mm3 Final    nRBC 11/18/2024 0.0  0.0 - 0.2 /100 WBC Final    Microcytes 11/18/2024 Slight/1+  None Seen Final    WBC Morphology 11/18/2024 Normal  Normal Final    Platelet Estimate 11/18/2024 Decreased  Normal Final   Lab on 11/15/2024   Component Date Value Ref Range Status    Protime 11/15/2024 30.6 (H)  12.3 - 15.1 Seconds Final    INR 11/15/2024 2.89 (H)  0.90 - 1.10 Final   Lab on 11/08/2024   Component Date Value Ref Range Status     Protime 11/08/2024 22.5 (H)  12.3 - 15.1 Seconds Final    INR 11/08/2024 1.92 (H)  0.90 - 1.10 Final   Lab on 11/01/2024   Component Date Value Ref Range Status    Protime 11/01/2024 27.3 (H)  12.3 - 15.1 Seconds Final    INR 11/01/2024 2.48 (H)  0.90 - 1.10 Final   Lab on 10/25/2024   Component Date Value Ref Range Status    Protime 10/25/2024 25.3 (H)  12.3 - 15.1 Seconds Final    INR 10/25/2024 2.25 (H)  0.90 - 1.10 Final   Lab on 10/11/2024   Component Date Value Ref Range Status    Protime 10/11/2024 26.6 (H)  12.3 - 15.1 Seconds Final    INR 10/11/2024 2.40 (H)  0.90 - 1.10 Final   Lab on 10/04/2024   Component Date Value Ref Range Status    Protime 10/04/2024 28.7 (H)  12.3 - 15.1 Seconds Final    INR 10/04/2024 2.65 (H)  0.90 - 1.10 Final   Lab on 09/27/2024   Component Date Value Ref Range Status    Protime 09/27/2024 22.3 (H)  12.3 - 15.1 Seconds Final    INR 09/27/2024 1.90 (H)  0.90 - 1.10 Final   Lab on 09/19/2024   Component Date Value Ref Range Status    Protime 09/19/2024 24.2 (H)  12.3 - 15.1 Seconds Final    INR 09/19/2024 2.12 (H)  0.90 - 1.10 Final   There may be more visits with results that are not included.      No radiology results for the last 90 days.    Assessment / Plan      Assessment & Plan:  1. Iron deficiency anemia, unspecified iron deficiency anemia type  2. Morbid obesity with BMI of 70 and over, adult  3. Bilateral pulmonary embolism  4. Current use of long term anticoagulation    Patient does have a iron deficiency anemia.  As patient is on Coumadin I suspect patient likely has a intermittent mucosal bleeding from GI tract causing a microcytic anemia.  No history suggestive any overt GI bleeding.    His last lab work on 11/18/2024 reveals hemoglobin of 12.2 g/dL which was 10.8 g/dL in June 2024.  MCV was 71.  WBC 10.95.  Patient is currently on Coumadin and last INR was 3.12.  CMP on 11/18/2024 was normal except borderline glucose 108.  Iron 21 ferritin 114 iron saturation 5  vitamin B12 505 folate 5.04.  Copper was high.  Fecal occult blood test was negative.    He needs a EGD colonoscopy followed by small bowel PillCam study for further evaluation.  However at this time patient is extremely high risk for any cardiopulmonary decompensation during the procedure given his morbid obesity with a BMI 73.94, recent bilateral PE and LORRAINE on CPAP.  I have discussed the case with anesthesia and anesthesia colleagues felt that this patient needs to be in the [facility] for any endoscopic procedure due to extremely high risk for adverse events that may need a immediate higher level of care.    I have discussed with the patient  We will refer him to  gastroenterology for EGD and colonoscopy  I will consider small bowel PillCam study if EGD colonoscopy is negative  Will follow him up in 3 months  Follow-up with hematology as planned  I have also discussed on referring him for bariatric surgery however at this time patient declined any referral or surgical intervention for his obesity.      Follow Up:   Return in about 6 months (around 5/25/2025).    Christophe Haines MD  [facility] [place]  11/25/2024  18:11 EST    Please note that portions of this note may have been completed with a voice recognition program.

## 2024-11-27 ENCOUNTER — PATIENT ROUNDING (BHMG ONLY) (OUTPATIENT)
Dept: GASTROENTEROLOGY | Facility: CLINIC | Age: 29
End: 2024-11-27
Payer: COMMERCIAL

## 2024-11-27 NOTE — PROGRESS NOTES
November 27, 2024    Hello, may I speak with Jesús Shi ?    My name is Liz     I am  with [facility]  [address]  [facility] [address] [phone].    Before we get started may I verify your date of birth? 1995    I am calling to officially welcome you to our practice and ask about your recent visit. Is this a good time to talk? yes    Tell me about your visit with us. What things went well? left message       We're always looking for ways to make our patients' experiences even better. Do you have recommendations on ways we may improve?  no    Overall were you satisfied with your first visit to our practice? yes       I appreciate you taking the time to speak with me today. Is there anything else I can do for you? no      Thank you, and have a great day.

## 2024-12-03 LAB — HBA1 GENE MUT ANL BLD/T: NORMAL

## 2024-12-06 ENCOUNTER — LAB (OUTPATIENT)
Dept: LAB | Facility: HOSPITAL | Age: 29
End: 2024-12-06
Payer: COMMERCIAL

## 2024-12-06 DIAGNOSIS — Z79.01 LONG TERM (CURRENT) USE OF ANTICOAGULANTS: ICD-10-CM

## 2024-12-06 LAB
INR PPP: 3.36 (ref 0.9–1.1)
PROTHROMBIN TIME: 34.3 SECONDS (ref 12.3–15.1)

## 2024-12-06 PROCEDURE — 36415 COLL VENOUS BLD VENIPUNCTURE: CPT

## 2024-12-06 PROCEDURE — 85610 PROTHROMBIN TIME: CPT

## 2024-12-13 ENCOUNTER — LAB (OUTPATIENT)
Dept: LAB | Facility: HOSPITAL | Age: 29
End: 2024-12-13
Payer: COMMERCIAL

## 2024-12-13 DIAGNOSIS — Z79.01 LONG TERM (CURRENT) USE OF ANTICOAGULANTS: ICD-10-CM

## 2024-12-13 LAB
INR PPP: 2.71 (ref 0.9–1.1)
PROTHROMBIN TIME: 29.1 SECONDS (ref 12.3–15.1)

## 2024-12-13 PROCEDURE — 85610 PROTHROMBIN TIME: CPT

## 2024-12-13 PROCEDURE — 36415 COLL VENOUS BLD VENIPUNCTURE: CPT

## 2024-12-20 ENCOUNTER — LAB (OUTPATIENT)
Dept: LAB | Facility: HOSPITAL | Age: 29
End: 2024-12-20
Payer: COMMERCIAL

## 2024-12-20 ENCOUNTER — OFFICE VISIT (OUTPATIENT)
Dept: PULMONOLOGY | Facility: CLINIC | Age: 29
End: 2024-12-20
Payer: COMMERCIAL

## 2024-12-20 VITALS
BODY MASS INDEX: 42.66 KG/M2 | HEIGHT: 72 IN | WEIGHT: 315 LBS | OXYGEN SATURATION: 97 % | SYSTOLIC BLOOD PRESSURE: 140 MMHG | HEART RATE: 84 BPM | DIASTOLIC BLOOD PRESSURE: 82 MMHG

## 2024-12-20 DIAGNOSIS — G47.33 OSA (OBSTRUCTIVE SLEEP APNEA): Primary | ICD-10-CM

## 2024-12-20 DIAGNOSIS — Z79.01 LONG TERM (CURRENT) USE OF ANTICOAGULANTS: ICD-10-CM

## 2024-12-20 DIAGNOSIS — I26.94 MULTIPLE SUBSEGMENTAL PULMONARY EMBOLI WITHOUT ACUTE COR PULMONALE: ICD-10-CM

## 2024-12-20 DIAGNOSIS — Z23 NEED FOR INFLUENZA VACCINATION: ICD-10-CM

## 2024-12-20 DIAGNOSIS — E66.01 MORBID OBESITY, UNSPECIFIED OBESITY TYPE: ICD-10-CM

## 2024-12-20 LAB
INR PPP: 2.41 (ref 0.9–1.1)
PROTHROMBIN TIME: 26.7 SECONDS (ref 12.3–15.1)

## 2024-12-20 PROCEDURE — 85610 PROTHROMBIN TIME: CPT

## 2024-12-20 PROCEDURE — 36415 COLL VENOUS BLD VENIPUNCTURE: CPT

## 2024-12-20 RX ORDER — WARFARIN SODIUM 5 MG/1
1 TABLET ORAL DAILY
COMMUNITY
Start: 2024-11-25

## 2024-12-20 NOTE — PROGRESS NOTES
"     Follow Up Office Visit      Patient Name: Jesús Mijares    Chief Complaint:    Chief Complaint   Patient presents with    Sleeping Problem       History of Present Illness: Jesús Mijares is a 29 y.o. male who is here today for follow up of sleep apnea.  Since last visit, he underwent a titration sleep study and has been using CPAP at 10 cmH2O.  He reports that he tolerates PAP use well and has more energy during the day when he uses it, though he says he just \"does not want to wear it\", though has been making efforts to try to keep it on at night.  He continues on warfarin for history of PE, no adverse bleeding.    Supplemental Oxygen: No    Subjective      Review of Systems:  Review of Systems   Constitutional:  Negative for fever and unexpected weight change.   Respiratory:  Negative for cough, shortness of breath and wheezing.    Cardiovascular:  Negative for chest pain.        Past Medical History:   Past Medical History:   Diagnosis Date    Asthma, intrinsic     Bronchitis     Candidiasis of skin and nail     Cellulitis     GERD (gastroesophageal reflux disease)     Hyperlipidemia     Hypertension     Ingrown nail     Pulmonary embolism 03/2024    Rash     Sleep apnea     using C pap machine       Past Surgical History:   Past Surgical History:   Procedure Laterality Date    TONSILECTOMY, ADENOIDECTOMY, BILATERAL MYRINGOTOMY AND TUBES Bilateral 1999       Family History:   Family History   Problem Relation Age of Onset    Hypothyroidism Mother     Osteoarthritis Mother     Hypertension Mother     Depression Mother     Anxiety disorder Mother     Obesity Mother     Obesity Father     Depression Father     Hypertension Father     Diabetes Father     Hypertension Brother     No Known Problems Maternal Grandmother     Hypertension Other     Colon cancer Neg Hx        Social History:   Social History     Socioeconomic History    Marital status: Single   Tobacco Use    Smoking status: Former     " Current packs/day: 0.00     Types: Cigarettes     Quit date: 2015     Years since quitting: 10.0     Passive exposure: Past    Smokeless tobacco: Never    Tobacco comments:     patient smoke 3 months   Vaping Use    Vaping status: Never Used   Substance and Sexual Activity    Alcohol use: No    Drug use: No    Sexual activity: Defer       Current Medications:     Current Outpatient Medications:     albuterol sulfate  (90 Base) MCG/ACT inhaler, Inhale 2 puffs Every 4 (Four) Hours As Needed for Wheezing., Disp: 18 g, Rfl: 0    amLODIPine (NORVASC) 5 MG tablet, Take 1 tablet by mouth Daily., Disp: 90 tablet, Rfl: 3    azelastine (ASTELIN) 0.1 % nasal spray, 1 spray into the nostril(s) as directed by provider 2 (Two) Times a Day. Use in each nostril as directed, Disp: 30 mL, Rfl: 5    carvedilol (COREG) 25 MG tablet, Take 1 tablet by mouth 2 (Two) Times a Day With Meals., Disp: 180 tablet, Rfl: 3    ferrous sulfate 325 (65 FE) MG tablet, TAKE 1 TABLET BY MOUTH EVERY DAY WITH BREAKFAST, Disp: 30 tablet, Rfl: 2    lisinopril-hydrochlorothiazide (PRINZIDE,ZESTORETIC) 20-12.5 MG per tablet, Take 1 tablet by mouth Daily. 200001, Disp: 90 tablet, Rfl: 3    triamcinolone (KENALOG) 0.025 % cream, APPLY TOPICALLY TO THE LEFT THIGH TWICE DAILY, Disp: , Rfl:     warfarin (COUMADIN) 5 MG tablet, Take 1 tablet by mouth Daily., Disp: , Rfl:     warfarin (COUMADIN) 2.5 MG tablet, Take 1 tablet by mouth every evening, in additon to 4 mg tablet (6.5 mg daily), until follow up with PCP (Patient not taking: Reported on 12/20/2024), Disp: 6 tablet, Rfl: 0    warfarin (COUMADIN) 4 MG tablet, Take 1 tablet by mouth every evening, in addition to 2.5 mg tablet (6.5 mg daily),  until follow up with PCP (Patient not taking: Reported on 12/20/2024), Disp: 6 tablet, Rfl: 0     Allergies:   Allergies   Allergen Reactions    Cherry Flavoring Agent (Non-Screening) Hives    Lorabid [Loracarbef] Hives       Objective     Physical Exam:  Vital  "Signs:   Vitals:    12/20/24 1122   BP: 140/82   Pulse: 84   SpO2: 97%   Weight: (!) 240 kg (530 lb)   Height: 182.9 cm (72\")     Body mass index is 71.88 kg/m².    Physical Exam  Vitals reviewed.   Constitutional:       General: He is not in acute distress.     Appearance: He is obese. He is not toxic-appearing.   HENT:      Head: Normocephalic and atraumatic.      Mouth/Throat:      Mouth: Mucous membranes are moist.   Eyes:      Extraocular Movements: Extraocular movements intact.      Conjunctiva/sclera: Conjunctivae normal.   Cardiovascular:      Rate and Rhythm: Normal rate.      Heart sounds: Normal heart sounds.   Pulmonary:      Effort: Pulmonary effort is normal.      Breath sounds: Normal breath sounds.   Abdominal:      General: There is no distension.      Palpations: Abdomen is soft.   Musculoskeletal:      Cervical back: Neck supple.      Comments: Deconditioned   Skin:     General: Skin is warm and dry.      Findings: No rash.   Neurological:      General: No focal deficit present.      Mental Status: He is alert and oriented to person, place, and time.   Psychiatric:         Mood and Affect: Mood normal.         Behavior: Behavior normal.       Results Review:   September 2024 CPAP titration study showed good response at a final pressure of 10 cm.  Poor sleep efficiency.    November - December 2024 compliance report showed 87% usage days with average of 2 hours 51 minutes with AHI of 3.4 on CPAP at 10 cmH2O.    Assessment / Plan      Assessment/Plan:   Diagnoses and all orders for this visit:    1. LORRAINE (obstructive sleep apnea) (Primary)  Titration sleep study and PAP compliance reports reviewed and discussed with patient.  Good clinical response to use of CPAP.  He was advised that he needs to be wearing his CPAP longer each night, with a goal of at least 4 hours. The patient was counseled on the risks of untreated sleep apnea and voiced understanding.     2. Need for influenza vaccination  -     " Fluzone >6mos ([id number])    3. Morbid obesity, unspecified obesity type  A significant amount of weight loss is necessary to aid in symptom control related to comorbid conditions.    4. Multiple subsegmental pulmonary emboli without acute cor pulmonale  Follow with hematology.  Continues on warfarin without any adverse bleeding.       Follow Up:   March 2025 as previously scheduled  The patient was counseled on diagnostic results, risks and benefits of treatment options, risk factor modifications and the importance of treatment compliance. The patient was advised to contact the clinic with concerns or worsening symptoms.     IRIS Soria   Pulmonary Medicine [place]     This document has been electronically signed by IRIS Soria  December 20, 2024

## 2025-01-03 ENCOUNTER — LAB (OUTPATIENT)
Dept: LAB | Facility: HOSPITAL | Age: 30
End: 2025-01-03
Payer: COMMERCIAL

## 2025-01-03 DIAGNOSIS — Z79.01 LONG TERM (CURRENT) USE OF ANTICOAGULANTS: ICD-10-CM

## 2025-01-03 LAB
INR PPP: 2.73 (ref 0.9–1.1)
PROTHROMBIN TIME: 29.4 SECONDS (ref 12.3–15.1)

## 2025-01-03 PROCEDURE — 36415 COLL VENOUS BLD VENIPUNCTURE: CPT

## 2025-01-03 PROCEDURE — 85610 PROTHROMBIN TIME: CPT

## 2025-01-17 ENCOUNTER — LAB (OUTPATIENT)
Dept: LAB | Facility: HOSPITAL | Age: 30
End: 2025-01-17
Payer: COMMERCIAL

## 2025-01-17 DIAGNOSIS — Z79.01 LONG TERM (CURRENT) USE OF ANTICOAGULANTS: ICD-10-CM

## 2025-01-17 LAB
INR PPP: 2.33 (ref 0.9–1.1)
PROTHROMBIN TIME: 26 SECONDS (ref 12.3–15.1)

## 2025-01-17 PROCEDURE — 36415 COLL VENOUS BLD VENIPUNCTURE: CPT

## 2025-01-17 PROCEDURE — 85610 PROTHROMBIN TIME: CPT

## 2025-01-24 ENCOUNTER — LAB (OUTPATIENT)
Dept: LAB | Facility: HOSPITAL | Age: 30
End: 2025-01-24
Payer: COMMERCIAL

## 2025-01-24 DIAGNOSIS — Z79.01 LONG TERM (CURRENT) USE OF ANTICOAGULANTS: ICD-10-CM

## 2025-01-24 LAB
INR PPP: 2.61 (ref 0.9–1.1)
PROTHROMBIN TIME: 28.3 SECONDS (ref 12.3–15.1)

## 2025-01-24 PROCEDURE — 85610 PROTHROMBIN TIME: CPT

## 2025-01-24 PROCEDURE — 36415 COLL VENOUS BLD VENIPUNCTURE: CPT

## 2025-01-31 ENCOUNTER — LAB (OUTPATIENT)
Dept: LAB | Facility: HOSPITAL | Age: 30
End: 2025-01-31
Payer: COMMERCIAL

## 2025-01-31 DIAGNOSIS — Z79.01 LONG TERM (CURRENT) USE OF ANTICOAGULANTS: ICD-10-CM

## 2025-01-31 LAB
INR PPP: 2.61 (ref 0.9–1.1)
PROTHROMBIN TIME: 28.3 SECONDS (ref 12.3–15.1)

## 2025-01-31 PROCEDURE — 85610 PROTHROMBIN TIME: CPT

## 2025-01-31 PROCEDURE — 36415 COLL VENOUS BLD VENIPUNCTURE: CPT

## 2025-02-14 ENCOUNTER — LAB (OUTPATIENT)
Dept: LAB | Facility: HOSPITAL | Age: 30
End: 2025-02-14
Payer: COMMERCIAL

## 2025-02-14 DIAGNOSIS — Z79.01 LONG TERM (CURRENT) USE OF ANTICOAGULANTS: ICD-10-CM

## 2025-02-14 LAB
INR PPP: 2.44 (ref 0.9–1.1)
PROTHROMBIN TIME: 26.9 SECONDS (ref 12.3–15.1)

## 2025-02-14 PROCEDURE — 85610 PROTHROMBIN TIME: CPT

## 2025-02-14 PROCEDURE — 36415 COLL VENOUS BLD VENIPUNCTURE: CPT

## 2025-02-28 ENCOUNTER — HOSPITAL ENCOUNTER (OUTPATIENT)
Dept: NUTRITION | Facility: HOSPITAL | Age: 30
Discharge: HOME | End: 2025-02-28
Payer: COMMERCIAL

## 2025-02-28 ENCOUNTER — LAB (OUTPATIENT)
Dept: LAB | Facility: HOSPITAL | Age: 30
End: 2025-02-28
Payer: COMMERCIAL

## 2025-02-28 DIAGNOSIS — Z79.01 LONG TERM (CURRENT) USE OF ANTICOAGULANTS: ICD-10-CM

## 2025-02-28 LAB
INR PPP: 2.05 (ref 0.9–1.1)
PROTHROMBIN TIME: 23.6 SECONDS (ref 12.3–15.1)

## 2025-02-28 PROCEDURE — 97803 MED NUTRITION INDIV SUBSEQ: CPT

## 2025-02-28 PROCEDURE — 85610 PROTHROMBIN TIME: CPT

## 2025-02-28 PROCEDURE — 36415 COLL VENOUS BLD VENIPUNCTURE: CPT

## 2025-02-28 NOTE — PROGRESS NOTES
Adult Outpatient Nutrition  Assessment    Patient Name:  Jesús Mijares  YOB: 1995  MRN: [id number]    Assessment Date:  2/28/2025    Comments:  Met with pt for f/up. Pt reports he has started drinking pop again and eating fried foods at work. Discussed ways to decrease pop and packing lunch for work to avoid fried foods. Pt is hopeful physical activity will pick back up when the weather gets warmer.     Electronically signed by:  Fozia Houser RD  02/28/25 13:31 EST

## 2025-03-03 ENCOUNTER — OFFICE VISIT (OUTPATIENT)
Dept: ONCOLOGY | Facility: CLINIC | Age: 30
End: 2025-03-03
Payer: COMMERCIAL

## 2025-03-03 ENCOUNTER — LAB (OUTPATIENT)
Dept: LAB | Facility: HOSPITAL | Age: 30
End: 2025-03-03
Payer: COMMERCIAL

## 2025-03-03 VITALS
SYSTOLIC BLOOD PRESSURE: 144 MMHG | RESPIRATION RATE: 20 BRPM | WEIGHT: 315 LBS | BODY MASS INDEX: 42.66 KG/M2 | HEIGHT: 72 IN | TEMPERATURE: 96.9 F | OXYGEN SATURATION: 96 % | HEART RATE: 80 BPM | DIASTOLIC BLOOD PRESSURE: 67 MMHG

## 2025-03-03 DIAGNOSIS — D50.9 MICROCYTIC ANEMIA: Primary | ICD-10-CM

## 2025-03-03 DIAGNOSIS — I26.99 OTHER ACUTE PULMONARY EMBOLISM, UNSPECIFIED WHETHER ACUTE COR PULMONALE PRESENT: ICD-10-CM

## 2025-03-03 DIAGNOSIS — D50.9 MICROCYTIC ANEMIA: ICD-10-CM

## 2025-03-03 LAB
ALBUMIN SERPL-MCNC: 3.8 G/DL (ref 3.5–5.2)
ALBUMIN/GLOB SERPL: 1.2 G/DL
ALP SERPL-CCNC: 90 U/L (ref 39–117)
ALT SERPL W P-5'-P-CCNC: 15 U/L (ref 1–41)
ANION GAP SERPL CALCULATED.3IONS-SCNC: 10.8 MMOL/L (ref 5–15)
AST SERPL-CCNC: 18 U/L (ref 1–40)
BASOPHILS # BLD AUTO: 0.03 10*3/MM3 (ref 0–0.2)
BASOPHILS NFR BLD AUTO: 0.4 % (ref 0–1.5)
BILIRUB SERPL-MCNC: 0.3 MG/DL (ref 0–1.2)
BUN SERPL-MCNC: 11 MG/DL (ref 6–20)
BUN/CREAT SERPL: 16.7 (ref 7–25)
CALCIUM SPEC-SCNC: 9.1 MG/DL (ref 8.6–10.5)
CHLORIDE SERPL-SCNC: 102 MMOL/L (ref 98–107)
CO2 SERPL-SCNC: 24.2 MMOL/L (ref 22–29)
CREAT SERPL-MCNC: 0.66 MG/DL (ref 0.76–1.27)
DEPRECATED RDW RBC AUTO: 45.1 FL (ref 37–54)
EGFRCR SERPLBLD CKD-EPI 2021: 130.2 ML/MIN/1.73
EOSINOPHIL # BLD AUTO: 0.13 10*3/MM3 (ref 0–0.4)
EOSINOPHIL NFR BLD AUTO: 1.5 % (ref 0.3–6.2)
ERYTHROCYTE [DISTWIDTH] IN BLOOD BY AUTOMATED COUNT: 17.1 % (ref 12.3–15.4)
FERRITIN SERPL-MCNC: 73.41 NG/ML (ref 30–400)
GLOBULIN UR ELPH-MCNC: 3.1 GM/DL
GLUCOSE SERPL-MCNC: 104 MG/DL (ref 65–99)
HCT VFR BLD AUTO: 36.5 % (ref 37.5–51)
HGB BLD-MCNC: 11.5 G/DL (ref 13–17.7)
IMM GRANULOCYTES # BLD AUTO: 0.03 10*3/MM3 (ref 0–0.05)
IMM GRANULOCYTES NFR BLD AUTO: 0.4 % (ref 0–0.5)
IRON 24H UR-MRATE: 37 MCG/DL (ref 59–158)
IRON SATN MFR SERPL: 11 % (ref 20–50)
LYMPHOCYTES # BLD AUTO: 1.79 10*3/MM3 (ref 0.7–3.1)
LYMPHOCYTES NFR BLD AUTO: 21.1 % (ref 19.6–45.3)
MCH RBC QN AUTO: 23.2 PG (ref 26.6–33)
MCHC RBC AUTO-ENTMCNC: 31.5 G/DL (ref 31.5–35.7)
MCV RBC AUTO: 73.6 FL (ref 79–97)
MONOCYTES # BLD AUTO: 0.65 10*3/MM3 (ref 0.1–0.9)
MONOCYTES NFR BLD AUTO: 7.7 % (ref 5–12)
NEUTROPHILS NFR BLD AUTO: 5.84 10*3/MM3 (ref 1.7–7)
NEUTROPHILS NFR BLD AUTO: 68.9 % (ref 42.7–76)
NRBC BLD AUTO-RTO: 0 /100 WBC (ref 0–0.2)
PLATELET # BLD AUTO: 284 10*3/MM3 (ref 140–450)
PMV BLD AUTO: 10.1 FL (ref 6–12)
POTASSIUM SERPL-SCNC: 4.5 MMOL/L (ref 3.5–5.2)
PROT SERPL-MCNC: 6.9 G/DL (ref 6–8.5)
RBC # BLD AUTO: 4.96 10*6/MM3 (ref 4.14–5.8)
SODIUM SERPL-SCNC: 137 MMOL/L (ref 136–145)
TIBC SERPL-MCNC: 350 MCG/DL (ref 298–536)
TRANSFERRIN SERPL-MCNC: 235 MG/DL (ref 200–360)
WBC NRBC COR # BLD AUTO: 8.47 10*3/MM3 (ref 3.4–10.8)

## 2025-03-03 PROCEDURE — 84466 ASSAY OF TRANSFERRIN: CPT

## 2025-03-03 PROCEDURE — 36415 COLL VENOUS BLD VENIPUNCTURE: CPT

## 2025-03-03 PROCEDURE — 83540 ASSAY OF IRON: CPT

## 2025-03-03 PROCEDURE — 85025 COMPLETE CBC W/AUTO DIFF WBC: CPT

## 2025-03-03 PROCEDURE — 82728 ASSAY OF FERRITIN: CPT

## 2025-03-03 PROCEDURE — 82390 ASSAY OF CERULOPLASMIN: CPT

## 2025-03-03 PROCEDURE — 80053 COMPREHEN METABOLIC PANEL: CPT

## 2025-03-03 NOTE — PROGRESS NOTES
Hematology and Oncology [place]  Office number [phone]    Fax number [phone]    Follow up      Date: 25     Patient Name: Jesús Mijares  MRN: [id number]  : 1995    Referred by: IRIS Ro    Chief Complaint: Bilateral PE    History of Present Illness: Jesús Mijares is a pleasant 29 y.o. male who presents today for evaluation of bilateral PE    The patient presented with progressive dyspnea and underwent CT of the chest to the ER 3/4/24 demonstrating  large burden of bilateral acute pulmonary embolus. Right heart strain.Right upper lobe and left lower lobe patchy lung opacities. Consider multifocal pneumonia.  Hospital notes mention being tested for RSV on 24, but he does not specifically recall that and his respiratory panel PCR from 3/3/2024 was negative. No preceding surgeries, travel. Works at [employer], on feet 8 hours per day for this job.    He was assessed by cardiology, but no thrombectomy was recommended.  He was recommended for Coumadin over DOAC given that Body mass index is 76.18 kg/m².    Reports his most recent INR was 4 and coumadin held 2 days, having labs redrawn today for PCP. Prior to holding he was on 8 mg daily. Unaware of dietary restrictions with Coumadin but states that he rarely eats fruits or vegetables.    Interval history:   He presents today for follow-up. Taking coumadin 5 mg daily.   Denies epistaxis except an isolated episode last week that resolved spontaneously after a few minutes. No gum bleeding, hematuria, melena or hematochezia. No CP, leg swelling, headaches, or new persistent pain. Endorses daily compliance with ferrous sulfate and coumadin.   He was evaluated by gastroenterology 2024.  They did agree with proceeding with an EGD and colonoscopy as well as possible PillCam.  However, because of his weight and LORRAINE he had to be referred to  for these procedures, and that is pending in April.    Past  Medical History:   Past Medical History:   Diagnosis Date    Asthma, intrinsic     Bronchitis     Candidiasis of skin and nail     Cellulitis     GERD (gastroesophageal reflux disease)     Hyperlipidemia     Hypertension     Ingrown nail     Pulmonary embolism 2024    Rash     Sleep apnea     using C pap machine       Past Surgical History:   Past Surgical History:   Procedure Laterality Date    TONSILECTOMY, ADENOIDECTOMY, BILATERAL MYRINGOTOMY AND TUBES Bilateral        Family History:   Family History   Problem Relation Age of Onset    Hypothyroidism Mother     Osteoarthritis Mother     Hypertension Mother     Depression Mother     Anxiety disorder Mother     Obesity Mother     Obesity Father     Depression Father     Hypertension Father     Diabetes Father     Hypertension Brother     No Known Problems Maternal Grandmother     Hypertension Other     Colon cancer Neg Hx    Grandmother  from blood clots at age 65. No other family of blood clots or sudden death.   Reportedly his grandmother had 4 types of breast cancer at age 16, 32, 47.  Aunt with thyroid cancer at age 43.  Aunt with melanoma at age 41.    Social History:   Social History     Socioeconomic History    Marital status: Single   Tobacco Use    Smoking status: Former     Current packs/day: 0.00     Types: Cigarettes     Quit date:      Years since quitting: 10.1     Passive exposure: Past    Smokeless tobacco: Never    Tobacco comments:     patient smoke 3 months   Vaping Use    Vaping status: Never Used   Substance and Sexual Activity    Alcohol use: No    Drug use: No    Sexual activity: Defer       Medications:     Current Outpatient Medications:     albuterol sulfate  (90 Base) MCG/ACT inhaler, Inhale 2 puffs Every 4 (Four) Hours As Needed for Wheezing., Disp: 18 g, Rfl: 0    amLODIPine (NORVASC) 5 MG tablet, Take 1 tablet by mouth Daily., Disp: 90 tablet, Rfl: 3    azelastine (ASTELIN) 0.1 % nasal spray, 1 spray into the  "nostril(s) as directed by provider 2 (Two) Times a Day. Use in each nostril as directed, Disp: 30 mL, Rfl: 5    carvedilol (COREG) 25 MG tablet, Take 1 tablet by mouth 2 (Two) Times a Day With Meals., Disp: 180 tablet, Rfl: 3    ferrous sulfate 325 (65 FE) MG tablet, TAKE 1 TABLET BY MOUTH EVERY DAY WITH BREAKFAST, Disp: 30 tablet, Rfl: 2    lisinopril-hydrochlorothiazide (PRINZIDE,ZESTORETIC) 20-12.5 MG per tablet, Take 1 tablet by mouth Daily. 200001, Disp: 90 tablet, Rfl: 3    triamcinolone (KENALOG) 0.025 % cream, APPLY TOPICALLY TO THE LEFT THIGH TWICE DAILY, Disp: , Rfl:     warfarin (COUMADIN) 2.5 MG tablet, Take 1 tablet by mouth every evening, in additon to 4 mg tablet (6.5 mg daily), until follow up with PCP, Disp: 6 tablet, Rfl: 0    warfarin (COUMADIN) 4 MG tablet, Take 1 tablet by mouth every evening, in addition to 2.5 mg tablet (6.5 mg daily),  until follow up with PCP, Disp: 6 tablet, Rfl: 0    warfarin (COUMADIN) 5 MG tablet, Take 1 tablet by mouth Daily., Disp: , Rfl:     Allergies:   Allergies   Allergen Reactions    Cherry Flavoring Agent (Non-Screening) Hives    Lorabid [Loracarbef] Hives       Objective     Vital Signs:   Vitals:    03/03/25 1347   BP: 144/67   Pulse: 80   Resp: 20   Temp: 96.9 °F (36.1 °C)   SpO2: 96%   Weight: (!) 255 kg (561 lb 12.8 oz)   Height: 182.9 cm (72.01\")   PainSc: 0-No pain    Body mass index is 76.18 kg/m².   Pain Score    03/03/25 1347   PainSc: 0-No pain       Physical Exam:   General: Morbidly obese no acute distress.  HEENT: Normocephalic, atraumatic. Sclera anicteric.   Neck: supple, no adenopathy.   Cardiovascular: regular rate and rhythm. No murmurs.   Respiratory: Normal rate. Clear to auscultation bilaterally.  Abdomen: Soft, nontender, non distended with normoactive bowel sounds. No hepatosplenomegaly  Lymph: no cervical, supraclavicular or axillary adenopathy.  Neuro: Alert and oriented x 3. No focal deficits.   Ext: Symmetric, no swelling.   Accurate " 3/4/25    Laboratory/Imaging Reviewed:   Lab on 02/28/2025   Component Date Value Ref Range Status    Protime 02/28/2025 23.6 (H)  12.3 - 15.1 Seconds Final    INR 02/28/2025 2.05 (H)  0.90 - 1.10 Final       Component      Latest Ref Rng 5/20/2024 5/21/2024 5/22/2024 5/28/2024 5/31/2024 6/3/2024 6/7/2024   Protime      12.3 - 15.1 Seconds 43.5 (H)  35.8 (H)  26.6 (H)  18.4 (H)  19.2 (H)  27.1 (H)  36.0 (H)    INR      0.90 - 1.10  4.49 (HH)  3.49 (H)  2.37 (H)  1.47 (H)  1.55 (H)  2.43 (H)  3.52 (H)      Component      Latest Ref Rng 6/14/2024 6/15/2024 6/17/2024 6/21/2024 6/24/2024   Protime      12.3 - 15.1 Seconds 41.4 (H)  34.6 (H)  25.1 (H)  20.6 (H)  19.9 (H)    INR      0.90 - 1.10  4.21 (HH)  3.34 (H)  2.20 (H)  1.70 (H)  1.62 (H)      No results found.    Assessment / Plan      Assessment/Plan:     1. Other acute pulmonary embolism, unspecified whether acute cor pulmonale present  2. Body mass index is 76.18 kg/m².    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  There is a family history of DVT, but this occurred later in life in a grandparent.  -He has no specific history of high risk of travel or preceding illness/immobility.  Although his BMI is elevated, he reports that he stands for 8 hours a day and to work as a .  -I agree with Coumadin given his elevated BMI.   -He should continue with close INR monitoring.  I reinforced a vitamin K consistent diet.  He has been referred to nutrition and consultation is pending with them soon.  We discussed the option of referral to the Coumadin clinic for pharmacist assessment with Coumadin management if desired by his PCP.  -His hypercoagulable workup to date has been limited by concurrent anticoagulation.  We reviewed available workup today.  This is notable for negative beta-2 glycoprotein antibodies and anticardiolipin IgM and IgG.  Prothrombin gene mutation and factor V Leiden mutation were not identified.  PNH was  negative.  -Repeat chest CT given that he has been outside of the therapeutic range and continues to experience stable dyspnea on exertion to reevaluate his clot burden.  Given his unexplained anemia and recent unprovoked thrombus I recommended an abdominal pelvis CT but his insurance declined this.   -We reviewed his chest CT which showed substantial improvement in his thrombus burden.  Continue Coumadin.  Would consider indefinite anticoagulation given that this was an unprovoked event and large PE burden  -We can consider further hypercoagulable workup during Lovenox bridge for GI workup.  -Reviewed INRs, he is remaining theraepeutic    3.  Strong family history of malignancy including multiple family members with early onset malignancy  -I recommend consideration of genetic testing  -He declines    4.  Persistent microcytic anemia concerning for iron deficiency anemia   -Worsening anemia with low iron stores.  Despite negative fecal occult blood, occult GI blood loss is still the most likely source. Saw gastro 11/2024, was referred to  for EGD/colonoscopy pending 4/1/25  -Once we know when these are scheduled, we can make recommendations for Lovenox bridge.  -Ferrous sulfate daily, labs stable.  -Hemoglobinopathy profile, alpha thalassemia genetic testing normal,  lead normal.  Copper level adequate and was elevated above reference range. Check ceruloplasmin    Orders Placed This Encounter   Procedures    Comprehensive Metabolic Panel    Ferritin    Iron Profile    Ceruloplasmin    CBC & Differential      Follow Up:   3 mo     Yolanda Leon MD  Hematology and Oncology

## 2025-03-04 LAB — CERULOPLASMIN SERPL-MCNC: 32 MG/DL (ref 16–31)

## 2025-03-04 RX ORDER — FERROUS SULFATE 325(65) MG
325 TABLET ORAL
Qty: 30 TABLET | Refills: 2 | Status: SHIPPED | OUTPATIENT
Start: 2025-03-04

## 2025-03-14 ENCOUNTER — TRANSCRIBE ORDERS (OUTPATIENT)
Dept: LAB | Facility: HOSPITAL | Age: 30
End: 2025-03-14
Payer: COMMERCIAL

## 2025-03-14 ENCOUNTER — LAB (OUTPATIENT)
Dept: LAB | Facility: HOSPITAL | Age: 30
End: 2025-03-14
Payer: COMMERCIAL

## 2025-03-14 DIAGNOSIS — Z79.01 LONG TERM (CURRENT) USE OF ANTICOAGULANTS: ICD-10-CM

## 2025-03-14 DIAGNOSIS — Z79.01 LONG TERM (CURRENT) USE OF ANTICOAGULANTS: Primary | ICD-10-CM

## 2025-03-14 LAB
INR PPP: 1.99 (ref 0.9–1.1)
PROTHROMBIN TIME: 23.1 SECONDS (ref 12.3–15.1)

## 2025-03-14 PROCEDURE — 36415 COLL VENOUS BLD VENIPUNCTURE: CPT

## 2025-03-14 PROCEDURE — 85610 PROTHROMBIN TIME: CPT

## 2025-03-18 ENCOUNTER — OFFICE VISIT (OUTPATIENT)
Dept: CARDIOLOGY | Facility: CLINIC | Age: 30
End: 2025-03-18
Payer: COMMERCIAL

## 2025-03-18 VITALS
BODY MASS INDEX: 42.66 KG/M2 | DIASTOLIC BLOOD PRESSURE: 90 MMHG | SYSTOLIC BLOOD PRESSURE: 136 MMHG | WEIGHT: 315 LBS | HEIGHT: 72 IN | OXYGEN SATURATION: 98 % | HEART RATE: 88 BPM

## 2025-03-18 DIAGNOSIS — I27.81 COR PULMONALE (CHRONIC): Primary | ICD-10-CM

## 2025-03-18 DIAGNOSIS — E66.01 MORBID OBESITY: ICD-10-CM

## 2025-03-18 DIAGNOSIS — I10 PRIMARY HYPERTENSION: ICD-10-CM

## 2025-03-18 DIAGNOSIS — R07.2 PRECORDIAL PAIN: ICD-10-CM

## 2025-03-18 RX ORDER — LISINOPRIL AND HYDROCHLOROTHIAZIDE 12.5; 2 MG/1; MG/1
2 TABLET ORAL DAILY
Qty: 180 TABLET | Refills: 3 | Status: SHIPPED | OUTPATIENT
Start: 2025-03-18

## 2025-03-18 RX ORDER — NITROGLYCERIN 0.4 MG/1
TABLET SUBLINGUAL
Qty: 100 TABLET | Refills: 11 | Status: SHIPPED | OUTPATIENT
Start: 2025-03-18

## 2025-03-18 NOTE — PROGRESS NOTES
"    Subjective:     Encounter Date:03/18/2025      Patient ID: Jesús Mijares is a 29 y.o. male.    Chief Complaint: Chest pain  HPI  This is a 29-year-old male patient who presents to cardiology clinic for routine follow-up.  Since his last visit he has had 2 episodes of chest discomfort both occurring at work.  Both episodes were relieved with Tylenol.  He did not seek medical attention.  The patient has been turned down as a candidate for coronary CT angiogram due to his massive morbid obesity.  He has been turned down as a candidate for dobutamine stress echo at the [facility] due to morbid obesity.  The patient is \"over the weight limit\" for the heart catheterization table at our facility.  He reports compliance with his medications with no perceived side effects.  He is a non-smoker.  He has not been able to lose any weight since his last visit.  The following portions of the patient's history were reviewed and updated as appropriate: allergies, current medications, past family history, past medical history, past social history, past surgical history and problem  Review of Systems   Constitutional: Negative for chills, diaphoresis, fever, malaise/fatigue, weight gain and weight loss.   HENT:  Negative for ear discharge, hearing loss, hoarse voice and nosebleeds.    Eyes:  Negative for discharge, double vision, pain and photophobia.   Cardiovascular:  Positive for chest pain. Negative for claudication, cyanosis, dyspnea on exertion, irregular heartbeat, leg swelling, near-syncope, orthopnea, palpitations, paroxysmal nocturnal dyspnea and syncope.   Respiratory:  Negative for cough, hemoptysis, sputum production and wheezing.    Endocrine: Negative for cold intolerance, heat intolerance, polydipsia, polyphagia and polyuria.   Hematologic/Lymphatic: Negative for adenopathy and bleeding problem. Does not bruise/bleed easily.   Skin:  Negative for color change, flushing, itching and " rash.   Musculoskeletal:  Negative for muscle cramps, muscle weakness, myalgias and stiffness.   Gastrointestinal:  Negative for abdominal pain, diarrhea, hematemesis, hematochezia, nausea and vomiting.   Genitourinary:  Negative for dysuria, frequency and nocturia.   Neurological:  Negative for focal weakness, loss of balance, numbness, paresthesias and seizures.   Psychiatric/Behavioral:  Negative for altered mental status, hallucinations and suicidal ideas.    Allergic/Immunologic: Negative for HIV exposure, hives and persistent infections.           Current Outpatient Medications:     albuterol sulfate  (90 Base) MCG/ACT inhaler, Inhale 2 puffs Every 4 (Four) Hours As Needed for Wheezing., Disp: 18 g, Rfl: 0    amLODIPine (NORVASC) 5 MG tablet, Take 1 tablet by mouth Daily., Disp: 90 tablet, Rfl: 3    azelastine (ASTELIN) 0.1 % nasal spray, 1 spray into the nostril(s) as directed by provider 2 (Two) Times a Day. Use in each nostril as directed, Disp: 30 mL, Rfl: 5    carvedilol (COREG) 25 MG tablet, Take 1 tablet by mouth 2 (Two) Times a Day With Meals., Disp: 180 tablet, Rfl: 3    ferrous sulfate 325 (65 FE) MG tablet, TAKE 1 TABLET BY MOUTH EVERY DAY WITH BREAKFAST, Disp: 30 tablet, Rfl: 2    lisinopril-hydrochlorothiazide (PRINZIDE,ZESTORETIC) 20-12.5 MG per tablet, Take 1 tablet by mouth Daily. 200001, Disp: 90 tablet, Rfl: 3    triamcinolone (KENALOG) 0.025 % cream, APPLY TOPICALLY TO THE LEFT THIGH TWICE DAILY, Disp: , Rfl:     warfarin (COUMADIN) 5 MG tablet, Take 1 tablet by mouth Daily., Disp: , Rfl:     Objective:   Vitals and nursing note reviewed.   Constitutional:       Appearance: Healthy appearance. Not in distress.   Neck:      Vascular: No JVR. JVD normal.   Pulmonary:      Effort: Pulmonary effort is normal.      Breath sounds: Normal breath sounds. No wheezing. No rhonchi. No rales.   Chest:      Chest wall: Not tender to palpatation.   Cardiovascular:      PMI at left midclavicular  "line. Normal rate. Regular rhythm. Normal S1. Normal S2.       Murmurs: There is no murmur.      No gallop.  No click. No rub.   Pulses:     Intact distal pulses.   Edema:     Peripheral edema absent.   Abdominal:      General: Bowel sounds are normal.      Palpations: Abdomen is soft.      Tenderness: There is no abdominal tenderness.   Musculoskeletal: Normal range of motion.         General: No tenderness. Skin:     General: Skin is warm and dry.   Neurological:      General: No focal deficit present.      Mental Status: Alert and oriented to person, place and time.       Blood pressure 136/90, pulse 88, height 182.9 cm (72\"), weight (!) 256 kg (565 lb), SpO2 98%.   Lab Review:     Assessment:       1. 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    2. Precordial pain  Atypical chest pain.  Probable musculoskeletal in etiology.  The patient is not a candidate for any form of invasive or noninvasive testing due to his body mass index approaching 80.    3. Morbid obesity  BMI is greater than 76.  The obesity pattern is central.  This is due to excess calorie intake.    4. Primary hypertension  Less than ideal blood pressure control.      ECG 12 Lead    Date/Time: 3/18/2025 11:21 AM  Performed by: Ludwig Petty MD    Authorized by: Ludwig Petty MD  Comparison: compared with previous ECG   Similar to previous ECG  Rhythm: sinus rhythm  Rate: normal  QRS axis: normal  Other findings: low voltage    Clinical impression: non-specific ECG          Plan:     I have recommended increasing his lisinopril 20 mg - 12.5 mg to 2 tablets by mouth every morning.  All other blood pressure medications will remain unchanged.    I have given him a prescription for sublingual nitroglycerin to use on an as-needed basis.    I do not " think there is any hope for meaningful weight loss given his intellectual challenges.

## 2025-03-20 ENCOUNTER — OFFICE VISIT (OUTPATIENT)
Dept: PULMONOLOGY | Facility: CLINIC | Age: 30
End: 2025-03-20
Payer: COMMERCIAL

## 2025-03-20 VITALS
RESPIRATION RATE: 20 BRPM | SYSTOLIC BLOOD PRESSURE: 124 MMHG | OXYGEN SATURATION: 98 % | HEART RATE: 77 BPM | BODY MASS INDEX: 42.66 KG/M2 | HEIGHT: 72 IN | WEIGHT: 315 LBS | DIASTOLIC BLOOD PRESSURE: 78 MMHG

## 2025-03-20 DIAGNOSIS — E66.01 MORBID OBESITY WITH BMI OF 70 AND OVER, ADULT: ICD-10-CM

## 2025-03-20 DIAGNOSIS — G47.33 OSA (OBSTRUCTIVE SLEEP APNEA): Primary | ICD-10-CM

## 2025-03-20 DIAGNOSIS — Z86.711 PERSONAL HISTORY OF PE (PULMONARY EMBOLISM): ICD-10-CM

## 2025-03-20 PROCEDURE — 3078F DIAST BP <80 MM HG: CPT | Performed by: NURSE PRACTITIONER

## 2025-03-20 PROCEDURE — 3074F SYST BP LT 130 MM HG: CPT | Performed by: NURSE PRACTITIONER

## 2025-03-20 PROCEDURE — 99213 OFFICE O/P EST LOW 20 MIN: CPT | Performed by: NURSE PRACTITIONER

## 2025-03-20 NOTE — PROGRESS NOTES
"Chief Complaint   Patient presents with    Sleeping Problem    Follow-up         Subjective   Jesús Mijares is a 29 y.o. male.   Patient comes back today for follow up of Obstructive Sleep apnea.     He has had the CPAP machine taken due to non-compliance. This is the 2nd time the machine was taken.     He states he cannot get used to the mask and does not want to try again right now.     He works 2nd shift. He works until 11 pm. He goes to bed around 1-2 am. He gets up 9-10 am.       The following portions of the patient's history were reviewed and updated as appropriate: allergies, current medications, past family history, past medical history, past social history, and past surgical history.      Review of Systems   HENT:  Negative for sinus pressure, sneezing and sore throat.    Respiratory:  Negative for cough, chest tightness, shortness of breath and wheezing.        Objective   Visit Vitals  /78   Pulse 77   Resp 20   Ht 182.9 cm (72\")   Wt (!) 255 kg (563 lb)   SpO2 98%   BMI 76.36 kg/m²       Physical Exam  Vitals reviewed.   Constitutional:       Appearance: He is well-developed.   HENT:      Head: Atraumatic.      Mouth/Throat:      Mouth: Mucous membranes are moist.      Comments: Crowded oropharynx.   Eyes:      Extraocular Movements: Extraocular movements intact.   Cardiovascular:      Rate and Rhythm: Normal rate and regular rhythm.   Abdominal:      Comments: Obese abdomen.    Skin:     General: Skin is warm.   Neurological:      Mental Status: He is alert and oriented to person, place, and time.           Assessment & Plan   Diagnoses and all orders for this visit:    1. LORRAINE (obstructive sleep apnea) (Primary)    2. Morbid obesity with BMI of 70 and over, adult    3. Personal history of PE (pulmonary embolism)           Return in about 1 year (around 3/20/2026) for Recheck, For Dr. Bond, Sleep ONLY.    DISCUSSION (if any):  Sleep study 10/2023   AHI 16/hour    Titration study 9/2024 "   CPAP 10 cm    He does not want to try CPAP again right now because he does not feel he will be successful. He feels like the mask sade him and he does not want it on his face.     He was asked to continue to follow up with Hematology for PE.     I have discussed with the patient that he definitely needs to use CPAP therapy given that he has had previous pulmonary embolism and has moderate sleep apnea.  He verbalizes understanding but does not feel he will be successful at this time.    Will follow the patient in a year or sooner if he has any issues.  I have told him if he has any worsening sleep issues, cardiac issues to call the office for an earlier appointment.      Dictated utilizing Dragon dictation.    This document was electronically signed by IRIS Denise March 20, 2025  16:17 EDT

## 2025-03-26 DIAGNOSIS — I10 ESSENTIAL (PRIMARY) HYPERTENSION: ICD-10-CM

## 2025-03-26 RX ORDER — LISINOPRIL AND HYDROCHLOROTHIAZIDE 12.5; 2 MG/1; MG/1
1 TABLET ORAL DAILY
Qty: 90 TABLET | Refills: 3 | Status: SHIPPED | OUTPATIENT
Start: 2025-03-26

## 2025-03-28 ENCOUNTER — LAB (OUTPATIENT)
Dept: LAB | Facility: HOSPITAL | Age: 30
End: 2025-03-28
Payer: COMMERCIAL

## 2025-03-28 DIAGNOSIS — Z79.01 LONG TERM (CURRENT) USE OF ANTICOAGULANTS: ICD-10-CM

## 2025-03-28 LAB
INR PPP: 1.76 (ref 0.9–1.1)
PROTHROMBIN TIME: 21 SECONDS (ref 12.3–15.1)

## 2025-03-28 PROCEDURE — 85610 PROTHROMBIN TIME: CPT

## 2025-03-28 PROCEDURE — 36415 COLL VENOUS BLD VENIPUNCTURE: CPT

## 2025-04-04 ENCOUNTER — LAB (OUTPATIENT)
Dept: LAB | Facility: HOSPITAL | Age: 30
End: 2025-04-04
Payer: COMMERCIAL

## 2025-04-04 DIAGNOSIS — Z79.01 LONG TERM (CURRENT) USE OF ANTICOAGULANTS: ICD-10-CM

## 2025-04-04 LAB
INR PPP: 1.57 (ref 0.9–1.1)
PROTHROMBIN TIME: 19.3 SECONDS (ref 12.3–15.1)

## 2025-04-04 PROCEDURE — 36415 COLL VENOUS BLD VENIPUNCTURE: CPT

## 2025-04-04 PROCEDURE — 85610 PROTHROMBIN TIME: CPT

## 2025-04-08 DIAGNOSIS — I10 ESSENTIAL HYPERTENSION: ICD-10-CM

## 2025-04-08 RX ORDER — AMLODIPINE BESYLATE 5 MG/1
5 TABLET ORAL DAILY
Qty: 90 TABLET | Refills: 3 | Status: SHIPPED | OUTPATIENT
Start: 2025-04-08

## 2025-04-11 ENCOUNTER — LAB (OUTPATIENT)
Dept: LAB | Facility: HOSPITAL | Age: 30
End: 2025-04-11
Payer: COMMERCIAL

## 2025-04-11 DIAGNOSIS — Z79.01 LONG TERM (CURRENT) USE OF ANTICOAGULANTS: ICD-10-CM

## 2025-04-11 LAB
INR PPP: 2.37 (ref 0.9–1.1)
PROTHROMBIN TIME: 26.4 SECONDS (ref 12.3–15.1)

## 2025-04-11 PROCEDURE — 36415 COLL VENOUS BLD VENIPUNCTURE: CPT

## 2025-04-11 PROCEDURE — 85610 PROTHROMBIN TIME: CPT

## 2025-04-18 DIAGNOSIS — I10 ESSENTIAL HYPERTENSION: ICD-10-CM

## 2025-04-18 RX ORDER — CARVEDILOL 25 MG/1
25 TABLET ORAL 2 TIMES DAILY WITH MEALS
Qty: 180 TABLET | Refills: 3 | Status: SHIPPED | OUTPATIENT
Start: 2025-04-18

## 2025-04-25 ENCOUNTER — LAB (OUTPATIENT)
Dept: LAB | Facility: HOSPITAL | Age: 30
End: 2025-04-25
Payer: COMMERCIAL

## 2025-04-25 DIAGNOSIS — Z79.01 LONG TERM (CURRENT) USE OF ANTICOAGULANTS: ICD-10-CM

## 2025-04-25 LAB
INR PPP: 3.52 (ref 0.9–1.1)
PROTHROMBIN TIME: 38.1 SECONDS (ref 12.3–15.1)

## 2025-04-25 PROCEDURE — 36415 COLL VENOUS BLD VENIPUNCTURE: CPT

## 2025-04-25 PROCEDURE — 85610 PROTHROMBIN TIME: CPT

## 2025-04-26 ENCOUNTER — LAB (OUTPATIENT)
Dept: LAB | Facility: HOSPITAL | Age: 30
End: 2025-04-26
Payer: COMMERCIAL

## 2025-04-26 DIAGNOSIS — Z79.01 LONG TERM (CURRENT) USE OF ANTICOAGULANTS: ICD-10-CM

## 2025-04-26 LAB
INR PPP: 3.08 (ref 0.9–1.1)
PROTHROMBIN TIME: 34.1 SECONDS (ref 12.3–15.1)

## 2025-04-26 PROCEDURE — 85610 PROTHROMBIN TIME: CPT

## 2025-04-26 PROCEDURE — 36415 COLL VENOUS BLD VENIPUNCTURE: CPT

## 2025-04-27 ENCOUNTER — APPOINTMENT (OUTPATIENT)
Dept: LAB | Facility: HOSPITAL | Age: 30
End: 2025-04-27
Payer: COMMERCIAL

## 2025-04-27 LAB
INR PPP: 2.26 (ref 0.9–1.1)
PROTHROMBIN TIME: 26.7 SECONDS (ref 12.3–15.1)

## 2025-04-27 PROCEDURE — 85610 PROTHROMBIN TIME: CPT

## 2025-04-27 PROCEDURE — 36415 COLL VENOUS BLD VENIPUNCTURE: CPT

## 2025-04-30 ENCOUNTER — LAB (OUTPATIENT)
Dept: LAB | Facility: HOSPITAL | Age: 30
End: 2025-04-30
Payer: COMMERCIAL

## 2025-04-30 DIAGNOSIS — Z79.01 LONG TERM (CURRENT) USE OF ANTICOAGULANTS: ICD-10-CM

## 2025-04-30 LAB
INR PPP: 1.24 (ref 0.9–1.1)
PROTHROMBIN TIME: 16.5 SECONDS (ref 12.3–15.1)

## 2025-04-30 PROCEDURE — 85610 PROTHROMBIN TIME: CPT

## 2025-04-30 PROCEDURE — 36415 COLL VENOUS BLD VENIPUNCTURE: CPT

## 2025-05-02 ENCOUNTER — LAB (OUTPATIENT)
Dept: LAB | Facility: HOSPITAL | Age: 30
End: 2025-05-02
Payer: COMMERCIAL

## 2025-05-02 DIAGNOSIS — Z79.01 LONG TERM (CURRENT) USE OF ANTICOAGULANTS: ICD-10-CM

## 2025-05-02 LAB
INR PPP: 1.13 (ref 0.9–1.1)
PROTHROMBIN TIME: 15.3 SECONDS (ref 12.3–15.1)

## 2025-05-02 PROCEDURE — 85610 PROTHROMBIN TIME: CPT

## 2025-05-02 PROCEDURE — 36415 COLL VENOUS BLD VENIPUNCTURE: CPT

## 2025-05-10 ENCOUNTER — LAB (OUTPATIENT)
Dept: LAB | Facility: HOSPITAL | Age: 30
End: 2025-05-10
Payer: COMMERCIAL

## 2025-05-10 DIAGNOSIS — Z79.01 LONG TERM (CURRENT) USE OF ANTICOAGULANTS: ICD-10-CM

## 2025-05-10 LAB
INR PPP: 1.62 (ref 0.9–1.1)
PROTHROMBIN TIME: 20.5 SECONDS (ref 12.3–15.1)

## 2025-05-10 PROCEDURE — 85610 PROTHROMBIN TIME: CPT

## 2025-05-10 PROCEDURE — 36415 COLL VENOUS BLD VENIPUNCTURE: CPT

## 2025-05-12 ENCOUNTER — LAB (OUTPATIENT)
Dept: LAB | Facility: HOSPITAL | Age: 30
End: 2025-05-12
Payer: COMMERCIAL

## 2025-05-12 DIAGNOSIS — Z79.01 LONG TERM (CURRENT) USE OF ANTICOAGULANTS: ICD-10-CM

## 2025-05-12 LAB
INR PPP: 1.58 (ref 0.9–1.1)
PROTHROMBIN TIME: 20 SECONDS (ref 12.3–15.1)

## 2025-05-12 PROCEDURE — 36415 COLL VENOUS BLD VENIPUNCTURE: CPT

## 2025-05-12 PROCEDURE — 85610 PROTHROMBIN TIME: CPT

## 2025-05-13 ENCOUNTER — LAB (OUTPATIENT)
Dept: LAB | Facility: HOSPITAL | Age: 30
End: 2025-05-13
Payer: COMMERCIAL

## 2025-05-13 DIAGNOSIS — Z79.01 LONG TERM (CURRENT) USE OF ANTICOAGULANTS: ICD-10-CM

## 2025-05-13 LAB
INR PPP: 1.83 (ref 0.9–1.1)
PROTHROMBIN TIME: 22.6 SECONDS (ref 12.3–15.1)

## 2025-05-13 PROCEDURE — 85610 PROTHROMBIN TIME: CPT

## 2025-05-13 PROCEDURE — 36415 COLL VENOUS BLD VENIPUNCTURE: CPT

## 2025-05-14 ENCOUNTER — LAB (OUTPATIENT)
Dept: LAB | Facility: HOSPITAL | Age: 30
End: 2025-05-14
Payer: COMMERCIAL

## 2025-05-14 DIAGNOSIS — Z79.01 LONG TERM (CURRENT) USE OF ANTICOAGULANTS: ICD-10-CM

## 2025-05-14 LAB
INR PPP: 2.17 (ref 0.9–1.1)
PROTHROMBIN TIME: 25.8 SECONDS (ref 12.3–15.1)

## 2025-05-14 PROCEDURE — 36415 COLL VENOUS BLD VENIPUNCTURE: CPT

## 2025-05-14 PROCEDURE — 85610 PROTHROMBIN TIME: CPT

## 2025-05-16 ENCOUNTER — LAB (OUTPATIENT)
Dept: LAB | Facility: HOSPITAL | Age: 30
End: 2025-05-16
Payer: COMMERCIAL

## 2025-05-16 DIAGNOSIS — Z79.01 LONG TERM (CURRENT) USE OF ANTICOAGULANTS: ICD-10-CM

## 2025-05-16 LAB
INR PPP: 2.65 (ref 0.9–1.1)
PROTHROMBIN TIME: 30.4 SECONDS (ref 12.3–15.1)

## 2025-05-16 PROCEDURE — 36415 COLL VENOUS BLD VENIPUNCTURE: CPT

## 2025-05-16 PROCEDURE — 85610 PROTHROMBIN TIME: CPT

## 2025-05-19 ENCOUNTER — LAB (OUTPATIENT)
Dept: LAB | Facility: HOSPITAL | Age: 30
End: 2025-05-19
Payer: COMMERCIAL

## 2025-05-19 DIAGNOSIS — Z79.01 LONG TERM (CURRENT) USE OF ANTICOAGULANTS: ICD-10-CM

## 2025-05-19 LAB
INR PPP: 2.71 (ref 0.9–1.1)
PROTHROMBIN TIME: 30.9 SECONDS (ref 12.3–15.1)

## 2025-05-19 PROCEDURE — 36415 COLL VENOUS BLD VENIPUNCTURE: CPT

## 2025-05-19 PROCEDURE — 85610 PROTHROMBIN TIME: CPT

## 2025-05-23 ENCOUNTER — LAB (OUTPATIENT)
Dept: LAB | Facility: HOSPITAL | Age: 30
End: 2025-05-23
Payer: COMMERCIAL

## 2025-05-23 DIAGNOSIS — Z79.01 LONG TERM (CURRENT) USE OF ANTICOAGULANTS: ICD-10-CM

## 2025-05-23 LAB
INR PPP: 3.53 (ref 0.9–1.1)
PROTHROMBIN TIME: 38.1 SECONDS (ref 12.3–15.1)

## 2025-05-23 PROCEDURE — 36415 COLL VENOUS BLD VENIPUNCTURE: CPT

## 2025-05-23 PROCEDURE — 85610 PROTHROMBIN TIME: CPT

## 2025-05-24 ENCOUNTER — LAB (OUTPATIENT)
Dept: LAB | Facility: HOSPITAL | Age: 30
End: 2025-05-24
Payer: COMMERCIAL

## 2025-05-24 ENCOUNTER — TRANSCRIBE ORDERS (OUTPATIENT)
Dept: LAB | Facility: HOSPITAL | Age: 30
End: 2025-05-24
Payer: COMMERCIAL

## 2025-05-24 DIAGNOSIS — Z79.01 ANTICOAGULATED: ICD-10-CM

## 2025-05-24 DIAGNOSIS — Z79.01 ANTICOAGULATED: Primary | ICD-10-CM

## 2025-05-24 LAB
INR PPP: 2.75 (ref 0.9–1.1)
PROTHROMBIN TIME: 31.2 SECONDS (ref 12.3–15.1)

## 2025-05-24 PROCEDURE — 85610 PROTHROMBIN TIME: CPT

## 2025-05-24 PROCEDURE — 36415 COLL VENOUS BLD VENIPUNCTURE: CPT

## 2025-05-25 ENCOUNTER — LAB (OUTPATIENT)
Dept: LAB | Facility: HOSPITAL | Age: 30
End: 2025-05-25
Payer: COMMERCIAL

## 2025-05-25 DIAGNOSIS — Z79.01 ANTICOAGULANT LONG-TERM USE: ICD-10-CM

## 2025-05-25 LAB
INR PPP: 2.28 (ref 0.9–1.1)
PROTHROMBIN TIME: 26.9 SECONDS (ref 12.3–15.1)

## 2025-05-25 PROCEDURE — 85610 PROTHROMBIN TIME: CPT

## 2025-05-27 ENCOUNTER — TRANSCRIBE ORDERS (OUTPATIENT)
Dept: LAB | Facility: HOSPITAL | Age: 30
End: 2025-05-27
Payer: COMMERCIAL

## 2025-05-27 ENCOUNTER — LAB (OUTPATIENT)
Dept: LAB | Facility: HOSPITAL | Age: 30
End: 2025-05-27
Payer: COMMERCIAL

## 2025-05-27 DIAGNOSIS — Z79.01 ANTICOAGULANT LONG-TERM USE: ICD-10-CM

## 2025-05-27 DIAGNOSIS — Z79.01 LONG TERM (CURRENT) USE OF ANTICOAGULANTS: Primary | ICD-10-CM

## 2025-05-27 LAB
INR PPP: 1.8 (ref 0.9–1.1)
PROTHROMBIN TIME: 22.2 SECONDS (ref 12.3–15.1)

## 2025-05-27 PROCEDURE — 85610 PROTHROMBIN TIME: CPT

## 2025-05-27 PROCEDURE — 36415 COLL VENOUS BLD VENIPUNCTURE: CPT

## 2025-05-28 ENCOUNTER — TELEPHONE (OUTPATIENT)
Dept: CARDIOLOGY | Facility: CLINIC | Age: 30
End: 2025-05-28
Payer: COMMERCIAL

## 2025-05-28 NOTE — TELEPHONE ENCOUNTER
Pt left a VM on 5/27/25 with no information other than phone number. Called to discuss his needs and received his VM left a message.

## 2025-05-29 ENCOUNTER — LAB (OUTPATIENT)
Dept: LAB | Facility: HOSPITAL | Age: 30
End: 2025-05-29
Payer: COMMERCIAL

## 2025-05-29 DIAGNOSIS — Z79.01 LONG TERM (CURRENT) USE OF ANTICOAGULANTS: ICD-10-CM

## 2025-05-29 LAB
INR PPP: 1.65 (ref 0.9–1.1)
PROTHROMBIN TIME: 20.7 SECONDS (ref 12.3–15.1)

## 2025-05-29 PROCEDURE — 85610 PROTHROMBIN TIME: CPT

## 2025-05-29 PROCEDURE — 36415 COLL VENOUS BLD VENIPUNCTURE: CPT

## 2025-06-02 ENCOUNTER — OFFICE VISIT (OUTPATIENT)
Dept: ONCOLOGY | Facility: CLINIC | Age: 30
End: 2025-06-02
Payer: COMMERCIAL

## 2025-06-02 ENCOUNTER — TELEPHONE (OUTPATIENT)
Dept: CARDIOLOGY | Facility: CLINIC | Age: 30
End: 2025-06-02
Payer: COMMERCIAL

## 2025-06-02 ENCOUNTER — LAB (OUTPATIENT)
Dept: LAB | Facility: HOSPITAL | Age: 30
End: 2025-06-02
Payer: COMMERCIAL

## 2025-06-02 VITALS
HEART RATE: 78 BPM | WEIGHT: 315 LBS | TEMPERATURE: 98.2 F | OXYGEN SATURATION: 96 % | HEIGHT: 72 IN | DIASTOLIC BLOOD PRESSURE: 76 MMHG | SYSTOLIC BLOOD PRESSURE: 132 MMHG | BODY MASS INDEX: 42.66 KG/M2 | RESPIRATION RATE: 20 BRPM

## 2025-06-02 DIAGNOSIS — D50.9 MICROCYTIC ANEMIA: Primary | ICD-10-CM

## 2025-06-02 DIAGNOSIS — Z79.01 LONG TERM (CURRENT) USE OF ANTICOAGULANTS: ICD-10-CM

## 2025-06-02 DIAGNOSIS — I26.99 OTHER ACUTE PULMONARY EMBOLISM, UNSPECIFIED WHETHER ACUTE COR PULMONALE PRESENT: ICD-10-CM

## 2025-06-02 DIAGNOSIS — I10 ESSENTIAL (PRIMARY) HYPERTENSION: ICD-10-CM

## 2025-06-02 LAB
INR PPP: 2.31 (ref 0.9–1.1)
PROTHROMBIN TIME: 27.2 SECONDS (ref 12.3–15.1)

## 2025-06-02 PROCEDURE — 3078F DIAST BP <80 MM HG: CPT | Performed by: NURSE PRACTITIONER

## 2025-06-02 PROCEDURE — 1159F MED LIST DOCD IN RCRD: CPT | Performed by: NURSE PRACTITIONER

## 2025-06-02 PROCEDURE — 99214 OFFICE O/P EST MOD 30 MIN: CPT | Performed by: NURSE PRACTITIONER

## 2025-06-02 PROCEDURE — 1160F RVW MEDS BY RX/DR IN RCRD: CPT | Performed by: NURSE PRACTITIONER

## 2025-06-02 PROCEDURE — 36415 COLL VENOUS BLD VENIPUNCTURE: CPT

## 2025-06-02 PROCEDURE — 3075F SYST BP GE 130 - 139MM HG: CPT | Performed by: NURSE PRACTITIONER

## 2025-06-02 PROCEDURE — 85610 PROTHROMBIN TIME: CPT

## 2025-06-02 PROCEDURE — 1126F AMNT PAIN NOTED NONE PRSNT: CPT | Performed by: NURSE PRACTITIONER

## 2025-06-02 RX ORDER — FERROUS SULFATE 325(65) MG
1 TABLET ORAL
Qty: 30 TABLET | Refills: 2 | Status: SHIPPED | OUTPATIENT
Start: 2025-06-02

## 2025-06-02 RX ORDER — LISINOPRIL AND HYDROCHLOROTHIAZIDE 12.5; 2 MG/1; MG/1
2 TABLET ORAL DAILY
Qty: 180 TABLET | Refills: 3 | Status: SHIPPED | OUTPATIENT
Start: 2025-06-02

## 2025-06-02 NOTE — PROGRESS NOTES
Hematology and Oncology [place]  Office number [phone]    Fax number [phone]    Follow up      Date: 25     Patient Name: Jesús Mijares  MRN: [id number]  : 1995    Referred by: IRIS Ro    Chief Complaint: Bilateral PE    History of Present Illness: Jesús Mijares is a pleasant 29 y.o. male who presents today for evaluation of bilateral PE    The patient presented with progressive dyspnea and underwent CT of the chest to the ER 3/4/24 demonstrating  large burden of bilateral acute pulmonary embolus. Right heart strain.Right upper lobe and left lower lobe patchy lung opacities. Consider multifocal pneumonia.  Hospital notes mention being tested for RSV on 24, but he does not specifically recall that and his respiratory panel PCR from 3/3/2024 was negative. No preceding surgeries, travel. Works at [employer], on feet 8 hours per day for this job.    He was assessed by cardiology, but no thrombectomy was recommended.  He was recommended for Coumadin over DOAC given that Body mass index is 77.7 kg/m².    Reports his most recent INR was 4 and coumadin held 2 days, having labs redrawn today for PCP. Prior to holding he was on 8 mg daily. Unaware of dietary restrictions with Coumadin but states that he rarely eats fruits or vegetables.    Interval history:   He presents today for follow-up.  Continues Coumadin 5 mg daily.  Denies epistaxis.  No gum bleeding, hematuria, melena,  Or hematochezia.   Denies chest pain, leg swelling, headaches, or new persistent pain.  Endorses daily compliance with ferrous sulfate and Coumadin.  Requesting refills ferrous sulfate today.  He has been previously evaluated by gastroenterology in 2024.  They agree with proceeding with EGD and colonoscopy as well as PillCam.  However, he thinks he missed a call from  to schedule these in April.       Past Medical History:   Past Medical History:   Diagnosis Date     Asthma, intrinsic     Bronchitis     Candidiasis of skin and nail     Cellulitis     GERD (gastroesophageal reflux disease)     Hyperlipidemia     Hypertension     Ingrown nail     Pulmonary embolism 2024    Rash     Sleep apnea     using C pap machine       Past Surgical History:   Past Surgical History:   Procedure Laterality Date    TONSILECTOMY, ADENOIDECTOMY, BILATERAL MYRINGOTOMY AND TUBES Bilateral        Family History:   Family History   Problem Relation Age of Onset    Hypothyroidism Mother     Osteoarthritis Mother     Hypertension Mother     Depression Mother     Anxiety disorder Mother     Obesity Mother     Obesity Father     Depression Father     Hypertension Father     Diabetes Father     Hypertension Brother     No Known Problems Maternal Grandmother     Hypertension Other     Colon cancer Neg Hx    Grandmother  from blood clots at age 65. No other family of blood clots or sudden death.   Reportedly his grandmother had 4 types of breast cancer at age 16, 32, 47.  Aunt with thyroid cancer at age 43.  Aunt with melanoma at age 41.    Social History:   Social History     Socioeconomic History    Marital status: Single   Tobacco Use    Smoking status: Former     Current packs/day: 0.00     Types: Cigarettes     Quit date:      Years since quitting: 10.4     Passive exposure: Past    Smokeless tobacco: Never    Tobacco comments:     patient smoke 3 months   Vaping Use    Vaping status: Never Used   Substance and Sexual Activity    Alcohol use: No    Drug use: No    Sexual activity: Defer       Medications:     Current Outpatient Medications:     albuterol sulfate  (90 Base) MCG/ACT inhaler, Inhale 2 puffs Every 4 (Four) Hours As Needed for Wheezing., Disp: 18 g, Rfl: 0    amLODIPine (NORVASC) 5 MG tablet, TAKE 1 TABLET BY MOUTH EVERY DAY, Disp: 90 tablet, Rfl: 3    azelastine (ASTELIN) 0.1 % nasal spray, 1 spray into the nostril(s) as directed by provider 2 (Two) Times a Day. Use in  "each nostril as directed, Disp: 30 mL, Rfl: 5    carvedilol (COREG) 25 MG tablet, TAKE 1 TABLET BY MOUTH TWICE A DAY WITH MEALS, Disp: 180 tablet, Rfl: 3    ferrous sulfate 325 (65 FE) MG tablet, TAKE 1 TABLET BY MOUTH EVERY DAY WITH BREAKFAST, Disp: 30 tablet, Rfl: 2    lisinopril-hydrochlorothiazide (PRINZIDE,ZESTORETIC) 20-12.5 MG per tablet, Take 2 tablets by mouth Daily., Disp: 180 tablet, Rfl: 3    nitroglycerin (NITROSTAT) 0.4 MG SL tablet, 1 under the tongue as needed for angina, may repeat q5mins for up three doses, Disp: 100 tablet, Rfl: 11    triamcinolone (KENALOG) 0.025 % cream, APPLY TOPICALLY TO THE LEFT THIGH TWICE DAILY, Disp: , Rfl:     warfarin (COUMADIN) 5 MG tablet, Take 1 tablet by mouth Daily., Disp: , Rfl:     Allergies:   Allergies   Allergen Reactions    Cherry Flavoring Agent (Non-Screening) Hives    Lorabid [Loracarbef] Hives       Objective     Vital Signs:   Vitals:    06/02/25 1410   BP: 132/76   Pulse: 78   Resp: 20   Temp: 98.2 °F (36.8 °C)   TempSrc: Infrared   SpO2: 96%   Weight: (!) 260 kg (573 lb)   Height: 182.9 cm (72.01\")   PainSc: 0-No pain      Body mass index is 77.7 kg/m².   Pain Score    06/02/25 1410   PainSc: 0-No pain         Physical Exam:   General: Morbidly obese no acute distress.  HEENT: Normocephalic, atraumatic. Sclera anicteric.   Neck: supple, no adenopathy.   Cardiovascular: regular rate and rhythm. No murmurs.   Respiratory: Normal rate. Clear to auscultation bilaterally.  Abdomen: Soft, nontender, non distended with normoactive bowel sounds. No hepatosplenomegaly  Lymph: no cervical, supraclavicular or axillary adenopathy.  Neuro: Alert and oriented x 3. No focal deficits.   Ext: Symmetric, no swelling.   Accurate as of 6/2/2025    Laboratory/Imaging Reviewed:   Lab on 06/02/2025   Component Date Value Ref Range Status    Protime 06/02/2025 27.2 (H)  12.3 - 15.1 Seconds Final    INR 06/02/2025 2.31 (H)  0.90 - 1.10 Final   Lab on 05/29/2025   Component Date " Value Ref Range Status    Protime 05/29/2025 20.7 (H)  12.3 - 15.1 Seconds Final    INR 05/29/2025 1.65 (H)  0.90 - 1.10 Final   Lab on 05/27/2025   Component Date Value Ref Range Status    Protime 05/27/2025 22.2 (H)  12.3 - 15.1 Seconds Final    INR 05/27/2025 1.80 (H)  0.90 - 1.10 Final   Lab on 05/25/2025   Component Date Value Ref Range Status    Protime 05/25/2025 26.9 (H)  12.3 - 15.1 Seconds Final    INR 05/25/2025 2.28 (H)  0.90 - 1.10 Final   Lab on 05/24/2025   Component Date Value Ref Range Status    Protime 05/24/2025 31.2 (H)  12.3 - 15.1 Seconds Final    INR 05/24/2025 2.75 (H)  0.90 - 1.10 Final   Lab on 05/23/2025   Component Date Value Ref Range Status    Protime 05/23/2025 38.1 (H)  12.3 - 15.1 Seconds Final    INR 05/23/2025 3.53 (H)  0.90 - 1.10 Final       Component      Latest Ref Rng 5/20/2024 5/21/2024 5/22/2024 5/28/2024 5/31/2024 6/3/2024 6/7/2024   Protime      12.3 - 15.1 Seconds 43.5 (H)  35.8 (H)  26.6 (H)  18.4 (H)  19.2 (H)  27.1 (H)  36.0 (H)    INR      0.90 - 1.10  4.49 (HH)  3.49 (H)  2.37 (H)  1.47 (H)  1.55 (H)  2.43 (H)  3.52 (H)      Component      Latest Ref Rng 6/14/2024 6/15/2024 6/17/2024 6/21/2024 6/24/2024   Protime      12.3 - 15.1 Seconds 41.4 (H)  34.6 (H)  25.1 (H)  20.6 (H)  19.9 (H)    INR      0.90 - 1.10  4.21 (HH)  3.34 (H)  2.20 (H)  1.70 (H)  1.62 (H)      No results found.    Assessment / Plan      Assessment/Plan:     1. Other acute pulmonary embolism, unspecified whether acute cor pulmonale present  2. Body mass index is 77.7 kg/m².    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  There is a family history of DVT, but this occurred later in life in a grandparent.  -He has no specific history of high risk of travel or preceding illness/immobility.  Although his BMI is elevated, he reports that he stands for 8 hours a day and to work as a .  -I agree with Coumadin given his elevated BMI.   -He should continue with close INR  monitoring.  I reinforced a vitamin K consistent diet.  He has been referred to nutrition and consultation is pending with them soon.  We discussed the option of referral to the Coumadin clinic for pharmacist assessment with Coumadin management if desired by his PCP.  -His hypercoagulable workup to date has been limited by concurrent anticoagulation.  We reviewed available workup today.  This is notable for negative beta-2 glycoprotein antibodies and anticardiolipin IgM and IgG.  Prothrombin gene mutation and factor V Leiden mutation were not identified.  PNH was negative.  -Repeat chest CT given that he has been outside of the therapeutic range and continues to experience stable dyspnea on exertion to reevaluate his clot burden.  Given his unexplained anemia and recent unprovoked thrombus I recommended an abdominal pelvis CT but his insurance declined this.   -We reviewed his chest CT which showed substantial improvement in his thrombus burden.  -Continue Coumadin.  Would consider indefinite anticoagulation given that this was an unprovoked event and large PE burden  -We can consider further hypercoagulable workup during Lovenox bridge for GI workup.  -Reviewed INRs, he is remaining theraepeutic    3.  Strong family history of malignancy including multiple family members with early onset malignancy  -I recommend consideration of genetic testing  -He declines    4.  Persistent microcytic anemia concerning for iron deficiency anemia   -Worsening anemia with low iron stores.  Despite negative fecal occult blood, occult GI blood loss is still the most likely source. Saw gastro 11/2024, was referred to  for EGD/colonoscopy pending 4/1/25.  Unfortunately he thinks he missed the call for EGD/colonoscopy.  We will notify [facility] of this and see if we can assist with scheduling.  -Once we know when these are scheduled, we can make recommendations for Lovenox bridge.  -Ferrous sulfate daily,  I will refill today.  -We will  repeat CBC, CMP, and ferritin levels with his next PT/INR.  -Hemoglobinopathy profile, alpha thalassemia genetic testing normal,  lead normal.  Copper level adequate and was elevated above reference range.  No reduction in ceruloplasmin/normal finding.    Orders Placed This Encounter   Procedures    Comprehensive Metabolic Panel    Ferritin    Iron Profile w/o Ferritin    CBC & Differential      Follow Up:   3 mo    Above reviewed and updated as of 6/2/2025     IRIS Tejeda    Hematology and Oncology

## 2025-06-02 NOTE — TELEPHONE ENCOUNTER
Pt called asking for refills on Lisinopril-HCTZ. Pt states he was taking two tablets daily but the most recent Rx shows one tablet daily from 3/26/25. Please advise

## 2025-06-04 ENCOUNTER — LAB (OUTPATIENT)
Dept: LAB | Facility: HOSPITAL | Age: 30
End: 2025-06-04
Payer: COMMERCIAL

## 2025-06-04 DIAGNOSIS — Z79.01 LONG TERM (CURRENT) USE OF ANTICOAGULANTS: ICD-10-CM

## 2025-06-04 LAB
ALBUMIN SERPL-MCNC: 3.8 G/DL (ref 3.5–5.2)
ALBUMIN/GLOB SERPL: 1.3 G/DL
ALP SERPL-CCNC: 84 U/L (ref 39–117)
ALT SERPL W P-5'-P-CCNC: 22 U/L (ref 1–41)
ANION GAP SERPL CALCULATED.3IONS-SCNC: 11.4 MMOL/L (ref 5–15)
AST SERPL-CCNC: 25 U/L (ref 1–40)
BASOPHILS # BLD AUTO: 0.03 10*3/MM3 (ref 0–0.2)
BASOPHILS NFR BLD AUTO: 0.5 % (ref 0–1.5)
BILIRUB SERPL-MCNC: 0.4 MG/DL (ref 0–1.2)
BUN SERPL-MCNC: 10 MG/DL (ref 6–20)
BUN/CREAT SERPL: 10.4 (ref 7–25)
CALCIUM SPEC-SCNC: 8.8 MG/DL (ref 8.6–10.5)
CHLORIDE SERPL-SCNC: 101 MMOL/L (ref 98–107)
CO2 SERPL-SCNC: 24.6 MMOL/L (ref 22–29)
CREAT SERPL-MCNC: 0.96 MG/DL (ref 0.76–1.27)
DEPRECATED RDW RBC AUTO: 45.7 FL (ref 37–54)
EGFRCR SERPLBLD CKD-EPI 2021: 109.7 ML/MIN/1.73
EOSINOPHIL # BLD AUTO: 0.12 10*3/MM3 (ref 0–0.4)
EOSINOPHIL NFR BLD AUTO: 1.9 % (ref 0.3–6.2)
ERYTHROCYTE [DISTWIDTH] IN BLOOD BY AUTOMATED COUNT: 17.1 % (ref 12.3–15.4)
FERRITIN SERPL-MCNC: 87.59 NG/ML (ref 30–400)
GLOBULIN UR ELPH-MCNC: 3 GM/DL
GLUCOSE SERPL-MCNC: 116 MG/DL (ref 65–99)
HCT VFR BLD AUTO: 35.8 % (ref 37.5–51)
HGB BLD-MCNC: 11.3 G/DL (ref 13–17.7)
IMM GRANULOCYTES # BLD AUTO: 0.04 10*3/MM3 (ref 0–0.05)
IMM GRANULOCYTES NFR BLD AUTO: 0.6 % (ref 0–0.5)
INR PPP: 3.07 (ref 0.9–1.1)
IRON 24H UR-MRATE: 31 MCG/DL (ref 59–158)
IRON SATN MFR SERPL: 9 % (ref 20–50)
LYMPHOCYTES # BLD AUTO: 1.34 10*3/MM3 (ref 0.7–3.1)
LYMPHOCYTES NFR BLD AUTO: 21 % (ref 19.6–45.3)
MCH RBC QN AUTO: 23.7 PG (ref 26.6–33)
MCHC RBC AUTO-ENTMCNC: 31.6 G/DL (ref 31.5–35.7)
MCV RBC AUTO: 75.2 FL (ref 79–97)
MONOCYTES # BLD AUTO: 0.43 10*3/MM3 (ref 0.1–0.9)
MONOCYTES NFR BLD AUTO: 6.7 % (ref 5–12)
NEUTROPHILS NFR BLD AUTO: 4.43 10*3/MM3 (ref 1.7–7)
NEUTROPHILS NFR BLD AUTO: 69.3 % (ref 42.7–76)
NRBC BLD AUTO-RTO: 0 /100 WBC (ref 0–0.2)
PLATELET # BLD AUTO: 251 10*3/MM3 (ref 140–450)
PMV BLD AUTO: 10.3 FL (ref 6–12)
POTASSIUM SERPL-SCNC: 4.4 MMOL/L (ref 3.5–5.2)
PROT SERPL-MCNC: 6.8 G/DL (ref 6–8.5)
PROTHROMBIN TIME: 34.1 SECONDS (ref 12.3–15.1)
RBC # BLD AUTO: 4.76 10*6/MM3 (ref 4.14–5.8)
SODIUM SERPL-SCNC: 137 MMOL/L (ref 136–145)
TIBC SERPL-MCNC: 358 MCG/DL (ref 298–536)
TRANSFERRIN SERPL-MCNC: 240 MG/DL (ref 200–360)
WBC NRBC COR # BLD AUTO: 6.39 10*3/MM3 (ref 3.4–10.8)

## 2025-06-04 PROCEDURE — 82728 ASSAY OF FERRITIN: CPT | Performed by: NURSE PRACTITIONER

## 2025-06-04 PROCEDURE — 85025 COMPLETE CBC W/AUTO DIFF WBC: CPT | Performed by: NURSE PRACTITIONER

## 2025-06-04 PROCEDURE — 85610 PROTHROMBIN TIME: CPT

## 2025-06-04 PROCEDURE — 84466 ASSAY OF TRANSFERRIN: CPT | Performed by: NURSE PRACTITIONER

## 2025-06-04 PROCEDURE — 36415 COLL VENOUS BLD VENIPUNCTURE: CPT

## 2025-06-04 PROCEDURE — 83540 ASSAY OF IRON: CPT | Performed by: NURSE PRACTITIONER

## 2025-06-04 PROCEDURE — 80053 COMPREHEN METABOLIC PANEL: CPT | Performed by: NURSE PRACTITIONER

## 2025-06-06 ENCOUNTER — LAB (OUTPATIENT)
Dept: LAB | Facility: HOSPITAL | Age: 30
End: 2025-06-06
Payer: COMMERCIAL

## 2025-06-06 DIAGNOSIS — Z79.01 LONG TERM (CURRENT) USE OF ANTICOAGULANTS: ICD-10-CM

## 2025-06-06 LAB
INR PPP: 3.24 (ref 0.9–1.1)
PROTHROMBIN TIME: 35.6 SECONDS (ref 12.3–15.1)

## 2025-06-06 PROCEDURE — 36415 COLL VENOUS BLD VENIPUNCTURE: CPT

## 2025-06-06 PROCEDURE — 85610 PROTHROMBIN TIME: CPT

## 2025-06-07 ENCOUNTER — LAB (OUTPATIENT)
Dept: LAB | Facility: HOSPITAL | Age: 30
End: 2025-06-07
Payer: COMMERCIAL

## 2025-06-07 DIAGNOSIS — Z79.01 LONG TERM (CURRENT) USE OF ANTICOAGULANTS: ICD-10-CM

## 2025-06-07 LAB
INR PPP: 2.67 (ref 0.9–1.1)
PROTHROMBIN TIME: 30.5 SECONDS (ref 12.3–15.1)

## 2025-06-07 PROCEDURE — 36415 COLL VENOUS BLD VENIPUNCTURE: CPT

## 2025-06-07 PROCEDURE — 85610 PROTHROMBIN TIME: CPT

## 2025-06-09 ENCOUNTER — LAB (OUTPATIENT)
Dept: LAB | Facility: HOSPITAL | Age: 30
End: 2025-06-09
Payer: COMMERCIAL

## 2025-06-09 DIAGNOSIS — Z79.01 LONG TERM (CURRENT) USE OF ANTICOAGULANTS: ICD-10-CM

## 2025-06-09 DIAGNOSIS — I10 ESSENTIAL HYPERTENSION: ICD-10-CM

## 2025-06-09 LAB
INR PPP: 2.62 (ref 0.9–1.1)
PROTHROMBIN TIME: 30.1 SECONDS (ref 12.3–15.1)

## 2025-06-09 PROCEDURE — 85610 PROTHROMBIN TIME: CPT

## 2025-06-09 PROCEDURE — 36415 COLL VENOUS BLD VENIPUNCTURE: CPT

## 2025-06-09 RX ORDER — AMLODIPINE BESYLATE 5 MG/1
5 TABLET ORAL DAILY
Qty: 90 TABLET | Refills: 3 | Status: SHIPPED | OUTPATIENT
Start: 2025-06-09

## 2025-06-12 ENCOUNTER — LAB (OUTPATIENT)
Dept: LAB | Facility: HOSPITAL | Age: 30
End: 2025-06-12
Payer: COMMERCIAL

## 2025-06-12 DIAGNOSIS — Z79.01 LONG TERM (CURRENT) USE OF ANTICOAGULANTS: ICD-10-CM

## 2025-06-12 LAB
INR PPP: 2.08 (ref 0.9–1.1)
PROTHROMBIN TIME: 25 SECONDS (ref 12.3–15.1)

## 2025-06-12 PROCEDURE — 85610 PROTHROMBIN TIME: CPT

## 2025-06-12 PROCEDURE — 36415 COLL VENOUS BLD VENIPUNCTURE: CPT

## 2025-06-16 ENCOUNTER — LAB (OUTPATIENT)
Dept: LAB | Facility: HOSPITAL | Age: 30
End: 2025-06-16
Payer: COMMERCIAL

## 2025-06-16 DIAGNOSIS — Z79.01 LONG TERM (CURRENT) USE OF ANTICOAGULANTS: ICD-10-CM

## 2025-06-16 LAB
INR PPP: 1.85 (ref 0.9–1.1)
PROTHROMBIN TIME: 22.7 SECONDS (ref 12.3–15.1)

## 2025-06-16 PROCEDURE — 85610 PROTHROMBIN TIME: CPT

## 2025-06-16 PROCEDURE — 36415 COLL VENOUS BLD VENIPUNCTURE: CPT

## 2025-06-18 ENCOUNTER — LAB (OUTPATIENT)
Dept: LAB | Facility: HOSPITAL | Age: 30
End: 2025-06-18
Payer: COMMERCIAL

## 2025-06-18 DIAGNOSIS — Z79.01 LONG TERM (CURRENT) USE OF ANTICOAGULANTS: ICD-10-CM

## 2025-06-18 LAB
INR PPP: 1.8 (ref 0.9–1.1)
PROTHROMBIN TIME: 22.2 SECONDS (ref 12.3–15.1)

## 2025-06-18 PROCEDURE — 36415 COLL VENOUS BLD VENIPUNCTURE: CPT

## 2025-06-18 PROCEDURE — 85610 PROTHROMBIN TIME: CPT

## 2025-06-20 ENCOUNTER — LAB (OUTPATIENT)
Dept: LAB | Facility: HOSPITAL | Age: 30
End: 2025-06-20
Payer: COMMERCIAL

## 2025-06-20 DIAGNOSIS — Z79.01 LONG TERM (CURRENT) USE OF ANTICOAGULANTS: ICD-10-CM

## 2025-06-20 LAB
INR PPP: 1.89 (ref 0.9–1.1)
PROTHROMBIN TIME: 23.1 SECONDS (ref 12.3–15.1)

## 2025-06-20 PROCEDURE — 36415 COLL VENOUS BLD VENIPUNCTURE: CPT

## 2025-06-20 PROCEDURE — 85610 PROTHROMBIN TIME: CPT

## 2025-06-23 ENCOUNTER — LAB (OUTPATIENT)
Dept: LAB | Facility: HOSPITAL | Age: 30
End: 2025-06-23
Payer: COMMERCIAL

## 2025-06-23 DIAGNOSIS — Z79.01 LONG TERM (CURRENT) USE OF ANTICOAGULANTS: ICD-10-CM

## 2025-06-23 LAB
INR PPP: 2.07 (ref 0.9–1.1)
PROTHROMBIN TIME: 24.9 SECONDS (ref 12.3–15.1)

## 2025-06-23 PROCEDURE — 85610 PROTHROMBIN TIME: CPT

## 2025-06-23 PROCEDURE — 36415 COLL VENOUS BLD VENIPUNCTURE: CPT

## 2025-06-25 ENCOUNTER — LAB (OUTPATIENT)
Dept: LAB | Facility: HOSPITAL | Age: 30
End: 2025-06-25
Payer: COMMERCIAL

## 2025-06-25 DIAGNOSIS — Z79.01 LONG TERM (CURRENT) USE OF ANTICOAGULANTS: ICD-10-CM

## 2025-06-25 LAB
INR PPP: 2.4 (ref 0.9–1.1)
PROTHROMBIN TIME: 28 SECONDS (ref 12.3–15.1)

## 2025-06-25 PROCEDURE — 36415 COLL VENOUS BLD VENIPUNCTURE: CPT

## 2025-06-25 PROCEDURE — 85610 PROTHROMBIN TIME: CPT

## 2025-06-27 ENCOUNTER — LAB (OUTPATIENT)
Dept: LAB | Facility: HOSPITAL | Age: 30
End: 2025-06-27
Payer: COMMERCIAL

## 2025-06-27 DIAGNOSIS — Z79.01 LONG TERM (CURRENT) USE OF ANTICOAGULANTS: ICD-10-CM

## 2025-06-27 LAB
INR PPP: 2.38 (ref 0.9–1.1)
PROTHROMBIN TIME: 27.8 SECONDS (ref 12.3–15.1)

## 2025-06-27 PROCEDURE — 85610 PROTHROMBIN TIME: CPT

## 2025-06-27 PROCEDURE — 36415 COLL VENOUS BLD VENIPUNCTURE: CPT

## 2025-06-30 ENCOUNTER — LAB (OUTPATIENT)
Dept: LAB | Facility: HOSPITAL | Age: 30
End: 2025-06-30
Payer: COMMERCIAL

## 2025-06-30 DIAGNOSIS — Z79.01 LONG TERM (CURRENT) USE OF ANTICOAGULANTS: ICD-10-CM

## 2025-06-30 LAB
INR PPP: 3.05 (ref 0.9–1.1)
PROTHROMBIN TIME: 33.9 SECONDS (ref 12.3–15.1)

## 2025-06-30 PROCEDURE — 36415 COLL VENOUS BLD VENIPUNCTURE: CPT

## 2025-06-30 PROCEDURE — 85610 PROTHROMBIN TIME: CPT

## 2025-07-02 ENCOUNTER — LAB (OUTPATIENT)
Dept: LAB | Facility: HOSPITAL | Age: 30
End: 2025-07-02

## 2025-07-02 DIAGNOSIS — Z79.01 LONG TERM (CURRENT) USE OF ANTICOAGULANTS: ICD-10-CM

## 2025-07-02 LAB
INR PPP: 2.83 (ref 0.9–1.1)
PROTHROMBIN TIME: 31.9 SECONDS (ref 12.3–15.1)

## 2025-07-02 PROCEDURE — 36415 COLL VENOUS BLD VENIPUNCTURE: CPT

## 2025-07-02 PROCEDURE — 85610 PROTHROMBIN TIME: CPT

## 2025-07-07 ENCOUNTER — LAB (OUTPATIENT)
Dept: LAB | Facility: HOSPITAL | Age: 30
End: 2025-07-07
Payer: MEDICAID

## 2025-07-07 DIAGNOSIS — Z79.01 LONG TERM (CURRENT) USE OF ANTICOAGULANTS: ICD-10-CM

## 2025-07-07 LAB
INR PPP: 3.23 (ref 0.9–1.1)
PROTHROMBIN TIME: 35.5 SECONDS (ref 12.3–15.1)

## 2025-07-07 PROCEDURE — 85610 PROTHROMBIN TIME: CPT

## 2025-07-07 PROCEDURE — 36415 COLL VENOUS BLD VENIPUNCTURE: CPT

## 2025-07-10 ENCOUNTER — LAB (OUTPATIENT)
Dept: LAB | Facility: HOSPITAL | Age: 30
End: 2025-07-10
Payer: MEDICAID

## 2025-07-10 DIAGNOSIS — Z79.01 LONG TERM (CURRENT) USE OF ANTICOAGULANTS: ICD-10-CM

## 2025-07-10 LAB
INR PPP: 3.51 (ref 0.9–1.1)
PROTHROMBIN TIME: 38 SECONDS (ref 12.3–15.1)

## 2025-07-10 PROCEDURE — 36415 COLL VENOUS BLD VENIPUNCTURE: CPT

## 2025-07-10 PROCEDURE — 85610 PROTHROMBIN TIME: CPT

## 2025-07-11 ENCOUNTER — LAB (OUTPATIENT)
Dept: LAB | Facility: HOSPITAL | Age: 30
End: 2025-07-11
Payer: MEDICAID

## 2025-07-11 DIAGNOSIS — Z79.01 LONG TERM (CURRENT) USE OF ANTICOAGULANTS: ICD-10-CM

## 2025-07-11 LAB
INR PPP: 3.49 (ref 0.9–1.1)
PROTHROMBIN TIME: 37.8 SECONDS (ref 12.3–15.1)

## 2025-07-11 PROCEDURE — 36415 COLL VENOUS BLD VENIPUNCTURE: CPT

## 2025-07-11 PROCEDURE — 85610 PROTHROMBIN TIME: CPT

## 2025-07-14 ENCOUNTER — LAB (OUTPATIENT)
Dept: LAB | Facility: HOSPITAL | Age: 30
End: 2025-07-14
Payer: MEDICAID

## 2025-07-14 DIAGNOSIS — Z79.01 LONG TERM (CURRENT) USE OF ANTICOAGULANTS: ICD-10-CM

## 2025-07-14 LAB
INR PPP: 2.8 (ref 0.9–1.1)
PROTHROMBIN TIME: 31.7 SECONDS (ref 12.3–15.1)

## 2025-07-14 PROCEDURE — 36415 COLL VENOUS BLD VENIPUNCTURE: CPT

## 2025-07-14 PROCEDURE — 85610 PROTHROMBIN TIME: CPT

## 2025-07-17 ENCOUNTER — LAB (OUTPATIENT)
Dept: LAB | Facility: HOSPITAL | Age: 30
End: 2025-07-17
Payer: MEDICAID

## 2025-07-17 DIAGNOSIS — Z79.01 LONG TERM (CURRENT) USE OF ANTICOAGULANTS: ICD-10-CM

## 2025-07-17 LAB
INR PPP: 2.51 (ref 0.9–1.1)
PROTHROMBIN TIME: 29 SECONDS (ref 12.3–15.1)

## 2025-07-17 PROCEDURE — 85610 PROTHROMBIN TIME: CPT

## 2025-07-17 PROCEDURE — 36415 COLL VENOUS BLD VENIPUNCTURE: CPT

## 2025-07-21 ENCOUNTER — LAB (OUTPATIENT)
Dept: LAB | Facility: HOSPITAL | Age: 30
End: 2025-07-21
Payer: MEDICAID

## 2025-07-21 DIAGNOSIS — Z79.01 LONG TERM (CURRENT) USE OF ANTICOAGULANTS: ICD-10-CM

## 2025-07-21 LAB
INR PPP: 2.28 (ref 0.9–1.1)
PROTHROMBIN TIME: 26.9 SECONDS (ref 12.3–15.1)

## 2025-07-21 PROCEDURE — 36415 COLL VENOUS BLD VENIPUNCTURE: CPT

## 2025-07-21 PROCEDURE — 85610 PROTHROMBIN TIME: CPT

## 2025-07-25 ENCOUNTER — LAB (OUTPATIENT)
Dept: LAB | Facility: HOSPITAL | Age: 30
End: 2025-07-25
Payer: MEDICAID

## 2025-07-25 DIAGNOSIS — Z79.01 LONG TERM (CURRENT) USE OF ANTICOAGULANTS: ICD-10-CM

## 2025-07-25 LAB
INR PPP: 2.18 (ref 0.9–1.1)
PROTHROMBIN TIME: 25.9 SECONDS (ref 12.3–15.1)

## 2025-07-25 PROCEDURE — 85610 PROTHROMBIN TIME: CPT

## 2025-07-25 PROCEDURE — 36415 COLL VENOUS BLD VENIPUNCTURE: CPT

## 2025-07-29 ENCOUNTER — LAB (OUTPATIENT)
Dept: LAB | Facility: HOSPITAL | Age: 30
End: 2025-07-29
Payer: MEDICAID

## 2025-07-29 DIAGNOSIS — Z79.01 LONG TERM (CURRENT) USE OF ANTICOAGULANTS: ICD-10-CM

## 2025-07-29 LAB
INR PPP: 2.82 (ref 0.9–1.1)
PROTHROMBIN TIME: 31.9 SECONDS (ref 12.3–15.1)

## 2025-07-29 PROCEDURE — 36415 COLL VENOUS BLD VENIPUNCTURE: CPT

## 2025-07-29 PROCEDURE — 85610 PROTHROMBIN TIME: CPT

## 2025-08-05 ENCOUNTER — LAB (OUTPATIENT)
Dept: LAB | Facility: HOSPITAL | Age: 30
End: 2025-08-05
Payer: MEDICAID

## 2025-08-05 DIAGNOSIS — Z79.01 LONG TERM (CURRENT) USE OF ANTICOAGULANTS: ICD-10-CM

## 2025-08-05 LAB
INR PPP: 3.92 (ref 0.9–1.1)
PROTHROMBIN TIME: 41.4 SECONDS (ref 12.3–15.1)

## 2025-08-05 PROCEDURE — 85610 PROTHROMBIN TIME: CPT

## 2025-08-05 PROCEDURE — 36415 COLL VENOUS BLD VENIPUNCTURE: CPT

## 2025-08-06 ENCOUNTER — LAB (OUTPATIENT)
Dept: LAB | Facility: HOSPITAL | Age: 30
End: 2025-08-06
Payer: MEDICAID

## 2025-08-06 DIAGNOSIS — Z79.01 LONG TERM (CURRENT) USE OF ANTICOAGULANTS: ICD-10-CM

## 2025-08-06 LAB
INR PPP: 3.63 (ref 0.9–1.1)
PROTHROMBIN TIME: 39 SECONDS (ref 12.3–15.1)

## 2025-08-06 PROCEDURE — 85610 PROTHROMBIN TIME: CPT

## 2025-08-06 PROCEDURE — 36415 COLL VENOUS BLD VENIPUNCTURE: CPT

## 2025-08-07 ENCOUNTER — LAB (OUTPATIENT)
Dept: LAB | Facility: HOSPITAL | Age: 30
End: 2025-08-07
Payer: MEDICAID

## 2025-08-07 DIAGNOSIS — Z79.01 LONG TERM (CURRENT) USE OF ANTICOAGULANTS: ICD-10-CM

## 2025-08-07 LAB
INR PPP: 2.46 (ref 0.9–1.1)
PROTHROMBIN TIME: 28.6 SECONDS (ref 12.3–15.1)

## 2025-08-07 PROCEDURE — 85610 PROTHROMBIN TIME: CPT

## 2025-08-07 PROCEDURE — 36415 COLL VENOUS BLD VENIPUNCTURE: CPT

## 2025-08-08 ENCOUNTER — LAB (OUTPATIENT)
Dept: LAB | Facility: HOSPITAL | Age: 30
End: 2025-08-08
Payer: MEDICAID

## 2025-08-08 DIAGNOSIS — Z79.01 LONG TERM (CURRENT) USE OF ANTICOAGULANTS: ICD-10-CM

## 2025-08-08 LAB
INR PPP: 2.06 (ref 0.9–1.1)
PROTHROMBIN TIME: 24.7 SECONDS (ref 12.3–15.1)

## 2025-08-08 PROCEDURE — 85610 PROTHROMBIN TIME: CPT

## 2025-08-11 ENCOUNTER — LAB (OUTPATIENT)
Dept: LAB | Facility: HOSPITAL | Age: 30
End: 2025-08-11
Payer: MEDICAID

## 2025-08-11 DIAGNOSIS — Z79.01 LONG TERM (CURRENT) USE OF ANTICOAGULANTS: ICD-10-CM

## 2025-08-11 LAB
INR PPP: 2.03 (ref 0.9–1.1)
PROTHROMBIN TIME: 24.5 SECONDS (ref 12.3–15.1)

## 2025-08-11 PROCEDURE — 36415 COLL VENOUS BLD VENIPUNCTURE: CPT

## 2025-08-11 PROCEDURE — 85610 PROTHROMBIN TIME: CPT

## 2025-08-15 ENCOUNTER — LAB (OUTPATIENT)
Dept: LAB | Facility: HOSPITAL | Age: 30
End: 2025-08-15
Payer: MEDICAID

## 2025-08-15 DIAGNOSIS — Z79.01 LONG TERM (CURRENT) USE OF ANTICOAGULANTS: ICD-10-CM

## 2025-08-15 LAB
INR PPP: 2.08 (ref 0.9–1.1)
PROTHROMBIN TIME: 24.9 SECONDS (ref 12.3–15.1)

## 2025-08-15 PROCEDURE — 85610 PROTHROMBIN TIME: CPT

## 2025-08-15 PROCEDURE — 36415 COLL VENOUS BLD VENIPUNCTURE: CPT

## 2025-08-22 ENCOUNTER — LAB (OUTPATIENT)
Dept: LAB | Facility: HOSPITAL | Age: 30
End: 2025-08-22
Payer: MEDICAID

## 2025-08-22 RX ORDER — FERROUS SULFATE 325(65) MG
1 TABLET ORAL
Qty: 30 TABLET | Refills: 2 | Status: SHIPPED | OUTPATIENT
Start: 2025-08-22

## 2025-08-29 ENCOUNTER — LAB (OUTPATIENT)
Dept: LAB | Facility: HOSPITAL | Age: 30
End: 2025-08-29
Payer: MEDICAID

## 2025-08-29 DIAGNOSIS — Z79.01 LONG TERM (CURRENT) USE OF ANTICOAGULANTS: ICD-10-CM

## 2025-08-29 LAB
INR PPP: 3.05 (ref 0.9–1.1)
PROTHROMBIN TIME: 34 SECONDS (ref 12.3–15.1)

## 2025-08-29 PROCEDURE — 36415 COLL VENOUS BLD VENIPUNCTURE: CPT

## 2025-08-29 PROCEDURE — 85610 PROTHROMBIN TIME: CPT
